# Patient Record
Sex: MALE | Race: BLACK OR AFRICAN AMERICAN | Employment: FULL TIME | URBAN - METROPOLITAN AREA
[De-identification: names, ages, dates, MRNs, and addresses within clinical notes are randomized per-mention and may not be internally consistent; named-entity substitution may affect disease eponyms.]

---

## 2018-07-22 ENCOUNTER — APPOINTMENT (OUTPATIENT)
Dept: CT IMAGING | Age: 38
End: 2018-07-22
Attending: EMERGENCY MEDICINE
Payer: COMMERCIAL

## 2018-07-22 ENCOUNTER — HOSPITAL ENCOUNTER (INPATIENT)
Age: 38
LOS: 3 days | Discharge: HOME | DRG: 064 | End: 2018-07-25
Attending: INTERNAL MEDICINE | Admitting: INTERNAL MEDICINE
Payer: COMMERCIAL

## 2018-07-22 ENCOUNTER — HOSPITAL ENCOUNTER (EMERGENCY)
Age: 38
Discharge: ANOTHER HOSPITAL | End: 2018-07-22
Attending: EMERGENCY MEDICINE
Payer: COMMERCIAL

## 2018-07-22 VITALS
HEIGHT: 69 IN | HEART RATE: 85 BPM | OXYGEN SATURATION: 95 % | RESPIRATION RATE: 33 BRPM | TEMPERATURE: 97.6 F | SYSTOLIC BLOOD PRESSURE: 159 MMHG | DIASTOLIC BLOOD PRESSURE: 98 MMHG | BODY MASS INDEX: 40.03 KG/M2 | WEIGHT: 270.28 LBS

## 2018-07-22 DIAGNOSIS — I62.9 INTRACRANIAL BLEED (HCC): Primary | ICD-10-CM

## 2018-07-22 DIAGNOSIS — R03.0 ELEVATED BLOOD PRESSURE READING: ICD-10-CM

## 2018-07-22 DIAGNOSIS — R07.89 ATYPICAL CHEST PAIN: Primary | ICD-10-CM

## 2018-07-22 DIAGNOSIS — I61.1 NONTRAUMATIC CORTICAL HEMORRHAGE OF LEFT CEREBRAL HEMISPHERE (HCC): ICD-10-CM

## 2018-07-22 LAB
ALBUMIN SERPL-MCNC: 3.7 G/DL (ref 3.5–5)
ALBUMIN/GLOB SERPL: 0.9 {RATIO} (ref 1.1–2.2)
ALP SERPL-CCNC: 111 U/L (ref 45–117)
ALT SERPL-CCNC: 18 U/L (ref 12–78)
ANION GAP SERPL CALC-SCNC: 7 MMOL/L (ref 5–15)
APPEARANCE UR: CLEAR
AST SERPL-CCNC: 14 U/L (ref 15–37)
BACTERIA URNS QL MICRO: NEGATIVE /HPF
BASOPHILS # BLD: 0 K/UL (ref 0–0.1)
BASOPHILS NFR BLD: 1 % (ref 0–1)
BILIRUB SERPL-MCNC: 0.5 MG/DL (ref 0.2–1)
BILIRUB UR QL: NEGATIVE
BUN SERPL-MCNC: 13 MG/DL (ref 6–20)
BUN/CREAT SERPL: 11 (ref 12–20)
CALCIUM SERPL-MCNC: 8.4 MG/DL (ref 8.5–10.1)
CHLORIDE SERPL-SCNC: 106 MMOL/L (ref 97–108)
CO2 SERPL-SCNC: 26 MMOL/L (ref 21–32)
COLOR UR: NORMAL
CREAT SERPL-MCNC: 1.22 MG/DL (ref 0.7–1.3)
DIFFERENTIAL METHOD BLD: NORMAL
EOSINOPHIL # BLD: 0.1 K/UL (ref 0–0.4)
EOSINOPHIL NFR BLD: 1 % (ref 0–7)
EPITH CASTS URNS QL MICRO: NORMAL /LPF
ERYTHROCYTE [DISTWIDTH] IN BLOOD BY AUTOMATED COUNT: 13.5 % (ref 11.5–14.5)
GLOBULIN SER CALC-MCNC: 4 G/DL (ref 2–4)
GLUCOSE SERPL-MCNC: 92 MG/DL (ref 65–100)
GLUCOSE UR STRIP.AUTO-MCNC: NEGATIVE MG/DL
HCT VFR BLD AUTO: 41.8 % (ref 36.6–50.3)
HGB BLD-MCNC: 13.7 G/DL (ref 12.1–17)
HGB UR QL STRIP: NEGATIVE
HYALINE CASTS URNS QL MICRO: NORMAL /LPF (ref 0–5)
IMM GRANULOCYTES # BLD: 0 K/UL (ref 0–0.04)
IMM GRANULOCYTES NFR BLD AUTO: 0 % (ref 0–0.5)
KETONES UR QL STRIP.AUTO: NEGATIVE MG/DL
LEUKOCYTE ESTERASE UR QL STRIP.AUTO: NEGATIVE
LYMPHOCYTES # BLD: 2.5 K/UL (ref 0.8–3.5)
LYMPHOCYTES NFR BLD: 34 % (ref 12–49)
MCH RBC QN AUTO: 29.8 PG (ref 26–34)
MCHC RBC AUTO-ENTMCNC: 32.8 G/DL (ref 30–36.5)
MCV RBC AUTO: 90.9 FL (ref 80–99)
MONOCYTES # BLD: 0.5 K/UL (ref 0–1)
MONOCYTES NFR BLD: 6 % (ref 5–13)
NEUTS SEG # BLD: 4.2 K/UL (ref 1.8–8)
NEUTS SEG NFR BLD: 58 % (ref 32–75)
NITRITE UR QL STRIP.AUTO: NEGATIVE
NRBC # BLD: 0 K/UL (ref 0–0.01)
NRBC BLD-RTO: 0 PER 100 WBC
PH UR STRIP: 7.5 [PH] (ref 5–8)
PLATELET # BLD AUTO: 246 K/UL (ref 150–400)
PMV BLD AUTO: 9.8 FL (ref 8.9–12.9)
POTASSIUM SERPL-SCNC: 3.8 MMOL/L (ref 3.5–5.1)
PROT SERPL-MCNC: 7.7 G/DL (ref 6.4–8.2)
PROT UR STRIP-MCNC: NEGATIVE MG/DL
RBC # BLD AUTO: 4.6 M/UL (ref 4.1–5.7)
RBC #/AREA URNS HPF: NORMAL /HPF (ref 0–5)
SODIUM SERPL-SCNC: 139 MMOL/L (ref 136–145)
SP GR UR REFRACTOMETRY: 1.02 (ref 1–1.03)
TROPONIN I SERPL-MCNC: <0.05 NG/ML
UA: UC IF INDICATED,UAUC: NORMAL
UROBILINOGEN UR QL STRIP.AUTO: 1 EU/DL (ref 0.2–1)
WBC # BLD AUTO: 7.2 K/UL (ref 4.1–11.1)
WBC URNS QL MICRO: NORMAL /HPF (ref 0–4)

## 2018-07-22 PROCEDURE — 70450 CT HEAD/BRAIN W/O DYE: CPT

## 2018-07-22 PROCEDURE — 65610000006 HC RM INTENSIVE CARE

## 2018-07-22 PROCEDURE — 36415 COLL VENOUS BLD VENIPUNCTURE: CPT | Performed by: EMERGENCY MEDICINE

## 2018-07-22 PROCEDURE — 85025 COMPLETE CBC W/AUTO DIFF WBC: CPT | Performed by: EMERGENCY MEDICINE

## 2018-07-22 PROCEDURE — 74011250636 HC RX REV CODE- 250/636: Performed by: NEUROLOGICAL SURGERY

## 2018-07-22 PROCEDURE — 74011000258 HC RX REV CODE- 258: Performed by: NEUROLOGICAL SURGERY

## 2018-07-22 PROCEDURE — 99285 EMERGENCY DEPT VISIT HI MDM: CPT

## 2018-07-22 PROCEDURE — 81001 URINALYSIS AUTO W/SCOPE: CPT | Performed by: EMERGENCY MEDICINE

## 2018-07-22 PROCEDURE — 74011250637 HC RX REV CODE- 250/637: Performed by: NEUROLOGICAL SURGERY

## 2018-07-22 PROCEDURE — 74011250636 HC RX REV CODE- 250/636: Performed by: EMERGENCY MEDICINE

## 2018-07-22 PROCEDURE — 93005 ELECTROCARDIOGRAM TRACING: CPT

## 2018-07-22 PROCEDURE — 84484 ASSAY OF TROPONIN QUANT: CPT | Performed by: EMERGENCY MEDICINE

## 2018-07-22 PROCEDURE — 80053 COMPREHEN METABOLIC PANEL: CPT | Performed by: EMERGENCY MEDICINE

## 2018-07-22 PROCEDURE — 71275 CT ANGIOGRAPHY CHEST: CPT

## 2018-07-22 PROCEDURE — 74011636320 HC RX REV CODE- 636/320: Performed by: EMERGENCY MEDICINE

## 2018-07-22 PROCEDURE — 74011250637 HC RX REV CODE- 250/637: Performed by: EMERGENCY MEDICINE

## 2018-07-22 RX ORDER — MORPHINE SULFATE 1 MG/ML
2 INJECTION, SOLUTION EPIDURAL; INTRATHECAL; INTRAVENOUS
Status: DISCONTINUED | OUTPATIENT
Start: 2018-07-22 | End: 2018-07-25 | Stop reason: HOSPADM

## 2018-07-22 RX ORDER — OXYCODONE AND ACETAMINOPHEN 5; 325 MG/1; MG/1
1 TABLET ORAL
Status: DISCONTINUED | OUTPATIENT
Start: 2018-07-22 | End: 2018-07-24

## 2018-07-22 RX ORDER — LABETALOL HYDROCHLORIDE 5 MG/ML
10 INJECTION, SOLUTION INTRAVENOUS
Status: DISCONTINUED | OUTPATIENT
Start: 2018-07-22 | End: 2018-07-23

## 2018-07-22 RX ORDER — SODIUM CHLORIDE AND POTASSIUM CHLORIDE .9; .15 G/100ML; G/100ML
SOLUTION INTRAVENOUS CONTINUOUS
Status: DISCONTINUED | OUTPATIENT
Start: 2018-07-22 | End: 2018-07-23

## 2018-07-22 RX ORDER — ACETAMINOPHEN 325 MG/1
650 TABLET ORAL
Status: DISCONTINUED | OUTPATIENT
Start: 2018-07-22 | End: 2018-07-25 | Stop reason: HOSPADM

## 2018-07-22 RX ORDER — SODIUM CHLORIDE 9 MG/ML
50 INJECTION, SOLUTION INTRAVENOUS
Status: COMPLETED | OUTPATIENT
Start: 2018-07-22 | End: 2018-07-22

## 2018-07-22 RX ORDER — HYDRALAZINE HYDROCHLORIDE 20 MG/ML
10 INJECTION INTRAMUSCULAR; INTRAVENOUS
Status: DISCONTINUED | OUTPATIENT
Start: 2018-07-22 | End: 2018-07-23

## 2018-07-22 RX ORDER — SODIUM CHLORIDE 0.9 % (FLUSH) 0.9 %
10 SYRINGE (ML) INJECTION
Status: COMPLETED | OUTPATIENT
Start: 2018-07-22 | End: 2018-07-22

## 2018-07-22 RX ORDER — ACETAMINOPHEN 500 MG
1000 TABLET ORAL ONCE
Status: COMPLETED | OUTPATIENT
Start: 2018-07-22 | End: 2018-07-22

## 2018-07-22 RX ADMIN — POTASSIUM CHLORIDE AND SODIUM CHLORIDE: 900; 150 INJECTION, SOLUTION INTRAVENOUS at 19:57

## 2018-07-22 RX ADMIN — SODIUM CHLORIDE 500 MG: 900 INJECTION, SOLUTION INTRAVENOUS at 19:59

## 2018-07-22 RX ADMIN — OXYCODONE HYDROCHLORIDE AND ACETAMINOPHEN 1 TABLET: 5; 325 TABLET ORAL at 20:02

## 2018-07-22 RX ADMIN — HYDRALAZINE HYDROCHLORIDE 10 MG: 20 INJECTION INTRAMUSCULAR; INTRAVENOUS at 20:00

## 2018-07-22 RX ADMIN — IOPAMIDOL 100 ML: 755 INJECTION, SOLUTION INTRAVENOUS at 15:49

## 2018-07-22 RX ADMIN — ACETAMINOPHEN 1000 MG: 500 TABLET ORAL at 17:28

## 2018-07-22 RX ADMIN — Medication 2 MG: at 20:39

## 2018-07-22 RX ADMIN — SODIUM CHLORIDE 50 ML/HR: 900 INJECTION, SOLUTION INTRAVENOUS at 15:49

## 2018-07-22 RX ADMIN — Medication 10 ML: at 15:49

## 2018-07-22 NOTE — CONSULTS
Pt seen and examined full consult dictated. Left temporal-parietal hemorrhage. H/a since Friday. Differential is subacute htn hem, tumor, avm.   Will get mri/mra in am.  Had dye for cta chest this evening

## 2018-07-22 NOTE — ED PROVIDER NOTES
EMERGENCY DEPARTMENT HISTORY AND PHYSICAL EXAM 
 
 
Date: 7/22/2018 Patient Name: Henry Mireles History of Presenting Illness Chief Complaint Patient presents with  
 Headache Ambulatory into the ED with c/o Lt sided headache, Lt arm pain and CP x 7/20  Arm Pain  Chest Pain History Provided By: Patient HPI: Henry Mireles, 45 y.o. male with PMHx significant for arthritis, presents himself w/ family to the ED with cc of constant, aching 7/10 frontal HA since Friday (7/20/18). Pt reports associated intermittent, throbbing right sided CP and SOB since onset of HA. He notes that his CP usually last for about 2 hrs before it is resolved. He is right hand dominant. Pt  denies any modifying factors. Pt denies any recent falls or trauma. Pt denies being on regularly prescribed medications. Pt denies any hx of cardiac issues and migraines. Pt specifically denies cough, rhinorrhea, congestion, weakness, left swelling, nausea, and vomiting. Chief Complaint: HA 
Duration: 3 Days Timing:  Constant Location: frontal head Quality: Aching Severity: 7 out of 10 Modifying Factors: denies any Associated Symptoms: intermittent right sided CP Note: Pt notes he is currently not in any pain at this time except for his HA. There are no other complaints, changes, or physical findings at this time. PCP: Javi Meza MD 
 
Past History Past Medical History: 
Past Medical History:  
Diagnosis Date  Arthritis Past Surgical History: 
History reviewed. No pertinent surgical history. Family History: 
Family History Problem Relation Age of Onset  Hypertension Mother  Heart Disease Father Social History: 
Social History Substance Use Topics  Smoking status: Never Smoker  Smokeless tobacco: Never Used  Alcohol use No  
 
 
Allergies: 
No Known Allergies Review of Systems Review of Systems Constitutional: Negative for activity change, chills and fever. HENT: Negative for congestion, rhinorrhea and sore throat. Eyes: Negative for pain and redness. Respiratory: Positive for shortness of breath. Negative for cough and chest tightness. Cardiovascular: Positive for chest pain (right sided). Negative for palpitations and leg swelling. Gastrointestinal: Negative for abdominal pain, diarrhea, nausea and vomiting. Genitourinary: Negative for dysuria, frequency and urgency. Musculoskeletal: Negative for back pain and neck pain. Skin: Negative for rash. Neurological: Positive for headaches (frontal). Negative for syncope and light-headedness. Psychiatric/Behavioral: Negative for confusion. All other systems reviewed and are negative. Physical Exam  
Physical Exam  
Constitutional: He is oriented to person, place, and time. He appears well-developed and well-nourished. No distress. HENT:  
Head: Normocephalic and atraumatic. Nose: Nose normal.  
Mouth/Throat: Oropharynx is clear and moist.  
Eyes: Conjunctivae and EOM are normal. Pupils are equal, round, and reactive to light. No scleral icterus. Conjunctiva clear bilaterally; Neck: Normal range of motion. Neck supple. No JVD present. No tracheal deviation present. No thyromegaly present. Cardiovascular: Normal rate, regular rhythm and intact distal pulses. Exam reveals no gallop and no friction rub. No murmur heard. Pulmonary/Chest: Effort normal and breath sounds normal. No stridor. No respiratory distress. He has no wheezes. He has no rales. He exhibits no tenderness. Abdominal: Soft. Bowel sounds are normal. He exhibits no distension. There is no tenderness. There is no rebound and no guarding. Musculoskeletal: Normal range of motion. He exhibits no edema or tenderness. Neurological: He is alert and oriented to person, place, and time. He displays normal reflexes. No cranial nerve deficit. He exhibits normal muscle tone.  Coordination normal.  
5/5 strength throughout; no past pointing; good heel to shin; negative romberg; holds both arms extended in front of them for 10 seconds without difficulty or drift; lifts legs off of bed without difficulty; no pronator drift; good equal  strength bilaterally; motor and sensory grossly intact; no facial asymmetry;   
Skin: Skin is warm and dry. No rash noted. He is not diaphoretic. No erythema. Psychiatric: He has a normal mood and affect. His behavior is normal.  
Nursing note and vitals reviewed. Diagnostic Study Results Labs - No results found for this or any previous visit (from the past 12 hour(s)). Radiologic Studies -  
CT Results  (Last 48 hours) 07/25/18 0851  CTA HEAD Final result Impression:  IMPRESSION: Left posterior temporal hematoma is unchanged from prior  
examination. There is no evidence for intracranial aneurysm or vascular  
malformation. Narrative: Indication: Evaluate intracranial hemorrhage. Contrast-enhanced CT angiogram of the head performed using 100 cc Isovue 300. Three-dimensional postprocessing performed. CT dose reduction was achieved through use of a standardized protocol tailored  
for this examination and are automatic exposure control for dose modulation dose  
reduction. FINDINGS:  
   
Precontrast images redemonstrate the left posterior temporal hematoma which is  
unchanged from July 22. There is mild edema adjacent to the hematoma but no  
significant mass effect. CT angiogram does not demonstrate abnormal vessels in the region of the  
hematoma. The major intracranial vessels are patent. There is no evidence for  
intracranial aneurysm or vascular malformation. Medical Decision Making I am the first provider for this patient. I reviewed the vital signs, available nursing notes, past medical history, past surgical history, family history and social history.  
 
Vital Signs-Reviewed the patient's vital signs. No data found. ED EKG interpretation: 13:55 Rhythm: normal sinus rhythm; and regular . Rate (approx.): 81; Axis: normal; IN Interval: normal; QRS interval: incomplete RBBB; ST/T wave: non-specific changes; No old ekg in ehr for comparison; This EKG was interpreted by Cuong Boyd MD,ED Provider. Records Reviewed: Nursing Notes and Old Medical Records Provider Notes (Medical Decision Making): DDx: ACS, viral syndrome, aortic dissection, PE 
 
ED Course:  
Initial assessment performed. The patients presenting problems have been discussed, and they are in agreement with the care plan formulated and outlined with them. I have encouraged them to ask questions as they arise throughout their visit. Consult Note: 
4:41 PM 
Cuong Boyd MD spoke with Dr.Matthew Maximiliano Matthews, Specialty: Neurosurgery Discussed pt's hx, disposition, and available diagnostic and imaging results. Reviewed care plans. Consultant agrees with plans as outlined. Recommends transferring pt to [facility].  
 
Consult Note: 
4:54 PM 
Cuong Boyd MD spoke with Dr. David Nunez, Specialty: [facility] 
Discussed pt's hx, disposition, and available diagnostic and imaging results. Reviewed care plans. Consultant agrees with plans as outlined. Will accept pt at [facility]. CT head with acute parenchymal intracerebral hemorrhage; vss. No neuro deficits. D/w neurosurgery. Will transfer to [facility] for further evaluation and treatment. Cuong Boyd MD] Critical Care Time:  
0 min Transfer Note: 
Patient is being transferred to [facility], transfer accepted by Dr. David Nunez. The reasons for patient's transfer have been discussed with the patient and available family. They convey agreement and understanding for the need to be transferred as explained to them by Cuong Boyd MD. 
 
PLAN: 
1. Transfer to [address]  
 
Diagnosis Clinical Impression: 1.  Atypical chest pain   
2. Elevated blood pressure reading 3. Nontraumatic cortical hemorrhage of left cerebral hemisphere ([address] Attestations: This note is prepared by [employer], acting as Scribe for MD Nidhi Bazzi MD: The scribe's documentation has been prepared under my direction and personally reviewed by me in its entirety. I confirm that the note above accurately reflects all work, treatment, procedures, and medical decision making performed by me.

## 2018-07-22 NOTE — ED NOTES
Pt presents to ED with c/o intermittent mid chest pain since Friday. Pt denies shortness of breath. Pt also with c/o R lower back pain x 1 week, pt denies injury. Pt also with c/o sinus pressure to forehead and behind L eye and sore throat since Friday. Pt denies n/v/d and fever. Pt placed on cardiac monitor x3. VSS. Pt in position of comfort with call bell within reach and no signs of acute distress noted.

## 2018-07-22 NOTE — ED NOTES
Critical care transport at bedside to transport pt to [address] [place] [address]. Patient awake and stable upon ED exit.

## 2018-07-22 NOTE — PROGRESS NOTES
TRANSFER - IN REPORT:    Verbal report received from [facility]  being [facility] for routine progression of care      Report consisted of patients Situation, Background, Assessment and   Recommendations(SBAR). Information from the following report(s) SBAR, Procedure Summary, Intake/Output, MAR, Med Rec Status and Cardiac Rhythm NSR was reviewed with the receiving nurse. Opportunity for questions and clarification was provided. Assessment completed upon patients arrival to unit and care assumed. 1900: Patient arrived on unit. A&O X4, unsure of president, VYAS, complains of 5/10 head pain, ectopy noted on monitor, hospitalist paged, paged Dr. Ramon Muhammad. Primary Nurse Madelin Hoskins and Edwardo Darden RN, RN performed a dual skin assessment on this patient No impairment noted  Kojo score is 22    1910: Spoke to Dr. Ramon Muhammad, orders received to maintain SBP <160 and will be in to see patient shortly. 1920: Hospitalist repaged. Dr. Ramon Muhammad at bedside. Orders received for ECG. Bedside and Verbal shift change report given to Kirsty RN by Michael RN & Edwardo Darden RN. Report included the following information SBAR, Kardex, ED Summary, Intake/Output, MAR, Recent Results, Med Rec Status and Cardiac Rhythm NSR, frequent PVCs.

## 2018-07-22 NOTE — PROGRESS NOTES
1930-bedside report received from [email or web address] using sbar format/alarms checked.  Dr. Ramon Muhammad into see pt/orders recieved  1945-Hospitalist paged to notify of pt arrival and need of admit orders  2000-c/o of bi frontal dull H/A, sbp 150-152, and c/o of being hungry-medicated per order w/percocet for pain /hydralazine for bp and jello/water and saltines given  2023-hospitalist re-paged for the 2nd time-mother, sig other and 1year old daughter at bedside/updated and all voiced their understanding and have no questions at this time  2046-no return call from hospitalist/nursing supervisor paged and made aware  2039-c/o of inc H/A  Morphine given per order  2055-hospitalist returned page/pt discussed and he will be up shortly to admit/pt stated his head feels better  2110-hospitalist at bedside (Dr. Ryland Flanagan)  2130-mother and sig other have gone home/phone numbers exchanged and pt access code given to both  2200-asleep unless disturbed  0009-c/o H/A 5/10 across forehead behind both eyes described as constant/dull and aching   Medicated w/morphine per order  0100-asleep      0730-bedside shift report given to RN using sbar format

## 2018-07-22 NOTE — IP AVS SNAPSHOT
[facility] [facility] [address] [place] [address] 
[phone] Patient: Lalitha Chappell MRN: [id number] KIC:7[birth year] About your hospitalization You were admitted on:  July 22, 2018 You last received care in the:  [facility] 6S NEURO-SCI TELE You were discharged on:  July 25, 2018 Why you were hospitalized Your primary diagnosis was: Intracranial Bleed (Hcc) Follow-up Information Follow up With Details Comments Contact Info King Dyana DO Go on 8/7/2018 for hemorrhagic stroke follow-up at 2:00. Arrive at 1:30. Bring photo ID, insurance card, co-pay ($45), list of medications [address] [place] [address] 56 [facility] at [address] 
[phone] Audrey Benavides MD On 7/30/2018 New PCP appointment on Monday July 30 @ 11:00 a.m. [facility] [address] [facility] 57 
[phone] Your Scheduled Appointments Monday July 30, 2018 11:00 AM EDT New Patient with Audrey Benavides MD  
[address] [place] [address] and Primary Care [address] [place] [address]) [address] [address]  
[phone] Tuesday August 07, 2018  2:00 PM EDT New Patient with DO Priscilla Brunoppard [facility] Neurology [facility] at [facility] [address] [place] [address] [address] [place] [address]  
[phone] Discharge Orders None A check juan indicates which time of day the medication should be taken. My Medications START taking these medications Instructions Each Dose to Equal  
 Morning Noon Evening Bedtime  
 amLODIPine 5 mg tablet Commonly known as:  Antonia Colon Start taking on:  7/26/2018 Your last dose was: Your next dose is: Take 1 Tab by mouth daily. 5 mg  
    
   
   
   
  
 butalbital-acetaminophen-caffeine -40 mg per tablet Commonly known as:  Rina Uriostegui Your last dose was: Your next dose is: Take 1 Tab by mouth every four (4) hours as needed for Headache. 1 Tab  
    
   
   
   
  
 hydroCHLOROthiazide 12.5 mg tablet Commonly known as:  HYDRODIURIL Your last dose was: Your next dose is: Take 1 Tab by mouth daily. 12.5 mg  
    
   
   
   
  
 levETIRAcetam 500 mg tablet Commonly known as:  KEPPRA Your last dose was: Your next dose is: Take 1 Tab by mouth two (2) times a day. 500 mg  
    
   
   
   
  
 metoprolol tartrate 50 mg tablet Commonly known as:  LOPRESSOR Your last dose was: Your next dose is: Take 1 Tab by mouth two (2) times a day. 50 mg Where to Get Your Medications Information on where to get these meds will be given to you by the nurse or doctor. ! Ask your nurse or doctor about these medications  
  amLODIPine 5 mg tablet  
 butalbital-acetaminophen-caffeine -40 mg per tablet  
 hydroCHLOROthiazide 12.5 mg tablet  
 levETIRAcetam 500 mg tablet  
 metoprolol tartrate 50 mg tablet Discharge Instructions Discharge Instructions PATIENT ID: Maria Eugenia Ordonez MRN: [id number] YOB: 1980 DATE OF ADMISSION: 7/22/2018  6:59 PM   
DATE OF DISCHARGE: 7/25/2018 PRIMARY CARE PROVIDER: Mis Dunbar MD  
 
ATTENDING PHYSICIAN: Lata Chavez MD 
DISCHARGING PROVIDER: Lata Chavez MD   
To contact this individual call [phone] and ask the  to page. If unavailable ask to be transferred the Adult Hospitalist Department. DISCHARGE DIAGNOSES intracranial bleed. CONSULTATIONS: None PROCEDURES/SURGERIES: * No surgery found * PENDING TEST RESULTS:  
At the time of discharge the following test results are still pending: na 
 
FOLLOW UP APPOINTMENTS:  
Follow-up Information Follow up With Details Comments Contact Info Angeline Kehr, DO Go on 8/7/2018 for hemorrhagic stroke follow-up at 2:00. Arrive at 1:30. Bring photo ID, insurance card, co-pay ($45), list of medications 200 [facility] [address] [place]  [facility] at [place] [address] 
[phone] Raza Alonzo MD   [address] [facility] 74 
[phone] ADDITIONAL CARE RECOMMENDATIONS: na 
 
DIET: Cardiac Diet ACTIVITY: back to work in w weeks. No heavy lifting, no over exertion WOUND CARE: na 
 
EQUIPMENT needed: na 
 
 
  
 SNF/Inpatient Rehab/LTAC  
x Independent/assisted living Hospice Other:  
 
  
 
Signed:  
Brooke Coffey MD 
7/25/2018 11:09 AM 
 
  
  
  
[employer] Announcement We are excited to announce that we are making your provider's discharge notes available to you in [employer]. You will see these notes when they are completed and signed by the physician that discharged you from your recent hospital stay.   If you have any questions or concerns about any information you see in [employer], please call the [employer] Information Department where you were seen or reach out to your Primary Care Provider for more information about your plan of care. Introducing hospitals & HEALTH SERVICES! Aura Araujo introduces [employer] patient portal. Now you can access parts of your medical record, email your doctor's office, and request medication refills online. 1. In your internet browser, go to [email or web address] 2. Click on the First Time User? Click Here link in the Sign In box. You will see the New Member Sign Up page. 3. Enter your [employer] Access Code exactly as it appears below. You will not need to use this code after youve completed the sign-up process. If you do not sign up before the expiration date, you must request a new code. · [employer] Access Code: [email or web address] Expires: 10/20/2018  1:47 PM 
 
4. Enter the last four digits of your Social Security Number (xxxx) and Date of Birth (mm/dd/yyyy) as indicated and click Submit. You will be taken to the next sign-up page. 5. Create a [employer] ID. This will be your [employer] login ID and cannot be changed, so think of one that is secure and easy to remember. 6. Create a [employer] password. You can change your password at any time. 7. Enter your Password Reset Question and Answer. This can be used at a later time if you forget your password. 8. Enter your e-mail address. You will receive e-mail notification when new information is available in [address] 9. Click Sign Up. You can now view and download portions of your medical record. 10. Click the Download Summary menu link to download a portable copy of your medical information. If you have questions, please visit the Frequently Asked Questions section of the [employer] website. Remember, [employer] is NOT to be used for urgent needs. For medical emergencies, dial 911. Now available from your iPhone and Android! Introducing Marty Fermin As a [employer] patient, I wanted to make you aware of our electronic visit tool called Marty Prietotiffanykenny. [employer] 24/7 allows you to connect within minutes with a medical provider 24 hours a day, seven days a week via a mobile device or tablet or logging into a secure website from your computer. You can access Marty Kamarafin from anywhere in the [place]. A virtual visit might be right for you when you have a simple condition and feel like you just dont want to get out of bed, or cant get away from work for an appointment, when your regular [employer] provider is not available (evenings, weekends or holidays), or when youre out of town and need minor care. Electronic visits cost only $49 and if the [employer] 24/7 provider determines a prescription is needed to treat your condition, one can be electronically transmitted to a nearby pharmacy*. Please take a moment to enroll today if you have not already done so. The enrollment process is free and takes just a few minutes. To enroll, please download the [employer] 24/7 tejas to your tablet or phone, or visit [email or web address] to enroll on your computer. And, as an [address] [place] [address] patient with a [employer] account, the results of your visits will be scanned into your electronic medical record and your primary care provider will be able to view the scanned results. We urge you to continue to see your regular [employer] provider for your ongoing medical care. And while your primary care provider may not be the one available when you seek a Marty Prietotiffanyfin virtual visit, the peace of mind you get from getting a real diagnosis real time can be priceless. For more information on Marty Connergege, view our Frequently Asked Questions (FAQs) at [email or web address]. Sincerely, 
 
Joni Subramanian MD 
Chief Medical Officer [facility] *:  certain medications cannot be prescribed via Marty Fermin Providers Seen During Your Hospitalization Provider Specialty Primary office phone Eve Pereira MD Internal Medicine [phone] Kei Mota MD Internal Medicine [phone] Cristóbal Troy MD Internal Medicine [phone] Your Primary Care Physician (PCP) Primary Care Physician Office Phone Office Fax Jerod Johns [phone] You are allergic to the following No active allergies Recent Documentation Height Weight BMI Smoking Status 1.753 m 121 kg 39.39 kg/m2 Never Smoker Emergency Contacts Name Discharge Info Relation Home Work Mobile Cousins,Lilliana DISCHARGE CAREGIVER [3] Parent [1] [phone] Patient Belongings The following personal items are in your possession at time of discharge: 
  Dental Appliances: None  Visual Aid: None      Home Medications: None   Jewelry: None       Other Valuables: Cell Phone  Personal Items Sent to Safe: none Please provide this summary of care documentation to your next provider. Signatures-by signing, you are acknowledging that this After Visit Summary has been reviewed with you and you have received a copy. Patient Signature:  ____________________________________________________________ Date:  ____________________________________________________________  
  
[facility] Provider Signature:  ____________________________________________________________ Date:  ____________________________________________________________

## 2018-07-22 NOTE — IP AVS SNAPSHOT
[address] 
[phone] Patient: Tino Dave MRN: [id number] YOF:[birth year] A check juan indicates which time of day the medication should be taken. My Medications START taking these medications Instructions Each Dose to Equal  
 Morning Noon Evening Bedtime  
 amLODIPine 5 mg tablet Commonly known as:  Abena Alberta Start taking on:  7/26/2018 Your last dose was: Your next dose is: Take 1 Tab by mouth daily. 5 mg  
    
   
   
   
  
 butalbital-acetaminophen-caffeine -40 mg per tablet Commonly known as:  Rahda Bigger Your last dose was: Your next dose is: Take 1 Tab by mouth every four (4) hours as needed for Headache. 1 Tab  
    
   
   
   
  
 levETIRAcetam 500 mg tablet Commonly known as:  KEPPRA Your last dose was: Your next dose is: Take 1 Tab by mouth two (2) times a day. 500 mg  
    
   
   
   
  
 metoprolol tartrate 25 mg tablet Commonly known as:  LOPRESSOR Your last dose was: Your next dose is: Take 2 Tabs by mouth every twelve (12) hours. 50 mg Where to Get Your Medications Information on where to get these meds will be given to you by the nurse or doctor. ! Ask your nurse or doctor about these medications  
  amLODIPine 5 mg tablet  
 butalbital-acetaminophen-caffeine -40 mg per tablet  
 levETIRAcetam 500 mg tablet  
 metoprolol tartrate 25 mg tablet

## 2018-07-23 ENCOUNTER — APPOINTMENT (OUTPATIENT)
Dept: MRI IMAGING | Age: 38
DRG: 064 | End: 2018-07-23
Attending: NEUROLOGICAL SURGERY
Payer: COMMERCIAL

## 2018-07-23 LAB
ALBUMIN SERPL-MCNC: 3 G/DL (ref 3.5–5)
ALBUMIN/GLOB SERPL: 0.8 {RATIO} (ref 1.1–2.2)
ALP SERPL-CCNC: 106 U/L (ref 45–117)
ALT SERPL-CCNC: 16 U/L (ref 12–78)
ANION GAP SERPL CALC-SCNC: 8 MMOL/L (ref 5–15)
AST SERPL-CCNC: 15 U/L (ref 15–37)
ATRIAL RATE: 67 BPM
ATRIAL RATE: 81 BPM
BILIRUB SERPL-MCNC: 0.7 MG/DL (ref 0.2–1)
BUN SERPL-MCNC: 12 MG/DL (ref 6–20)
BUN/CREAT SERPL: 10 (ref 12–20)
CALCIUM SERPL-MCNC: 8.1 MG/DL (ref 8.5–10.1)
CALCULATED P AXIS, ECG09: 64 DEGREES
CALCULATED R AXIS, ECG10: 173 DEGREES
CALCULATED R AXIS, ECG10: 58 DEGREES
CALCULATED T AXIS, ECG11: 153 DEGREES
CALCULATED T AXIS, ECG11: 52 DEGREES
CHLORIDE SERPL-SCNC: 106 MMOL/L (ref 97–108)
CO2 SERPL-SCNC: 25 MMOL/L (ref 21–32)
CREAT SERPL-MCNC: 1.2 MG/DL (ref 0.7–1.3)
DIAGNOSIS, 93000: NORMAL
DIAGNOSIS, 93000: NORMAL
GLOBULIN SER CALC-MCNC: 3.8 G/DL (ref 2–4)
GLUCOSE SERPL-MCNC: 95 MG/DL (ref 65–100)
P-R INTERVAL, ECG05: 144 MS
P-R INTERVAL, ECG05: 152 MS
POTASSIUM SERPL-SCNC: 4.3 MMOL/L (ref 3.5–5.1)
PROT SERPL-MCNC: 6.8 G/DL (ref 6.4–8.2)
Q-T INTERVAL, ECG07: 370 MS
Q-T INTERVAL, ECG07: 404 MS
QRS DURATION, ECG06: 102 MS
QRS DURATION, ECG06: 116 MS
QTC CALCULATION (BEZET), ECG08: 426 MS
QTC CALCULATION (BEZET), ECG08: 429 MS
SODIUM SERPL-SCNC: 139 MMOL/L (ref 136–145)
VENTRICULAR RATE, ECG03: 67 BPM
VENTRICULAR RATE, ECG03: 81 BPM

## 2018-07-23 PROCEDURE — 65660000000 HC RM CCU STEPDOWN

## 2018-07-23 PROCEDURE — 70544 MR ANGIOGRAPHY HEAD W/O DYE: CPT

## 2018-07-23 PROCEDURE — G8988 SELF CARE GOAL STATUS: HCPCS

## 2018-07-23 PROCEDURE — 80053 COMPREHEN METABOLIC PANEL: CPT | Performed by: INTERNAL MEDICINE

## 2018-07-23 PROCEDURE — 74011000258 HC RX REV CODE- 258: Performed by: NEUROLOGICAL SURGERY

## 2018-07-23 PROCEDURE — 74011250637 HC RX REV CODE- 250/637: Performed by: NEUROLOGICAL SURGERY

## 2018-07-23 PROCEDURE — 93306 TTE W/DOPPLER COMPLETE: CPT

## 2018-07-23 PROCEDURE — 97161 PT EVAL LOW COMPLEX 20 MIN: CPT

## 2018-07-23 PROCEDURE — A9575 INJ GADOTERATE MEGLUMI 0.1ML: HCPCS | Performed by: INTERNAL MEDICINE

## 2018-07-23 PROCEDURE — 97535 SELF CARE MNGMENT TRAINING: CPT

## 2018-07-23 PROCEDURE — 70553 MRI BRAIN STEM W/O & W/DYE: CPT

## 2018-07-23 PROCEDURE — 97165 OT EVAL LOW COMPLEX 30 MIN: CPT

## 2018-07-23 PROCEDURE — 36415 COLL VENOUS BLD VENIPUNCTURE: CPT | Performed by: INTERNAL MEDICINE

## 2018-07-23 PROCEDURE — 74011250637 HC RX REV CODE- 250/637: Performed by: NURSE PRACTITIONER

## 2018-07-23 PROCEDURE — 74011250636 HC RX REV CODE- 250/636: Performed by: INTERNAL MEDICINE

## 2018-07-23 PROCEDURE — 74011250636 HC RX REV CODE- 250/636: Performed by: NEUROLOGICAL SURGERY

## 2018-07-23 PROCEDURE — 97116 GAIT TRAINING THERAPY: CPT

## 2018-07-23 PROCEDURE — 74011000250 HC RX REV CODE- 250: Performed by: NURSE PRACTITIONER

## 2018-07-23 PROCEDURE — G8987 SELF CARE CURRENT STATUS: HCPCS

## 2018-07-23 PROCEDURE — G8989 SELF CARE D/C STATUS: HCPCS

## 2018-07-23 PROCEDURE — 74011250637 HC RX REV CODE- 250/637: Performed by: INTERNAL MEDICINE

## 2018-07-23 RX ORDER — GADOTERATE MEGLUMINE 376.9 MG/ML
20 INJECTION INTRAVENOUS
Status: COMPLETED | OUTPATIENT
Start: 2018-07-23 | End: 2018-07-23

## 2018-07-23 RX ORDER — LEVETIRACETAM 500 MG/1
500 TABLET ORAL 2 TIMES DAILY
Status: DISCONTINUED | OUTPATIENT
Start: 2018-07-23 | End: 2018-07-25 | Stop reason: HOSPADM

## 2018-07-23 RX ORDER — HYDRALAZINE HYDROCHLORIDE 20 MG/ML
10 INJECTION INTRAMUSCULAR; INTRAVENOUS
Status: DISCONTINUED | OUTPATIENT
Start: 2018-07-23 | End: 2018-07-25 | Stop reason: HOSPADM

## 2018-07-23 RX ORDER — METOPROLOL TARTRATE 25 MG/1
25 TABLET, FILM COATED ORAL EVERY 12 HOURS
Status: DISCONTINUED | OUTPATIENT
Start: 2018-07-23 | End: 2018-07-25 | Stop reason: HOSPADM

## 2018-07-23 RX ORDER — LABETALOL HYDROCHLORIDE 5 MG/ML
10 INJECTION, SOLUTION INTRAVENOUS
Status: DISCONTINUED | OUTPATIENT
Start: 2018-07-23 | End: 2018-07-25 | Stop reason: HOSPADM

## 2018-07-23 RX ADMIN — ACETAMINOPHEN 650 MG: 325 TABLET ORAL at 08:34

## 2018-07-23 RX ADMIN — LEVETIRACETAM 500 MG: 500 TABLET ORAL at 20:33

## 2018-07-23 RX ADMIN — GADOTERATE MEGLUMINE 20 ML: 376.9 INJECTION INTRAVENOUS at 12:06

## 2018-07-23 RX ADMIN — METOPROLOL TARTRATE 25 MG: 25 TABLET ORAL at 20:33

## 2018-07-23 RX ADMIN — ACETAMINOPHEN 650 MG: 325 TABLET ORAL at 16:28

## 2018-07-23 RX ADMIN — POTASSIUM CHLORIDE AND SODIUM CHLORIDE: 900; 150 INJECTION, SOLUTION INTRAVENOUS at 04:49

## 2018-07-23 RX ADMIN — LABETALOL HYDROCHLORIDE 10 MG: 5 INJECTION, SOLUTION INTRAVENOUS at 13:30

## 2018-07-23 RX ADMIN — SODIUM CHLORIDE 500 MG: 900 INJECTION, SOLUTION INTRAVENOUS at 08:29

## 2018-07-23 RX ADMIN — Medication 2 MG: at 00:09

## 2018-07-23 NOTE — CDMP QUERY
Can you please clarify if the pt is or was  being monitored/treated for:      =>Hypertensive crisis (POA) as evidenced by pt who presents with BP up to 118, with severe headache, found to have ICH, now requiring ICU level care, IV Hypertensive meds, close neuro checks, (MRI negative for tumor)      =>Hypertensive emergency  =>Other Explanation of clinical findings  =>Clinically Undetermined (no explanation for clinical findings)     The medical record reflects the following clinical findings, treatment, and risk factors:     Risk Factors:   BMI is @  40.7,   Clinical Indicators:Severe Headache for last 3 days. SBP up to 118 documented. Treatment: ound to have [address], now requiring ICU level care, IV Hypertensive meds, close neuro checks, (MRI negative for tumor)       Please clarify and document your clinical opinion in the progress notes and discharge summary including the definitive and/or presumptive diagnosis, (suspected or probable), related to the above clinical findings. Please include clinical findings supporting your diagnosis.      REFERENCE:  -    Hypertensive Crisis: BP elevates rapidly and severely enough to potentially cause organ damage (e.g. severe HA, SOB, nosebleed, severe anxiety, nausea/vomiting, etc.)    Hypertensive Emergency: SBP >180 or DBP > 120 with associated organ dysfunction (e.g. CVA, LOC, memory loss, MI, NIURKA, aortic dissection, angina, pulmonary edema, encephalopathy, retinopathy, HELLP, etc.)    Thank you,  Diana BARBERN, [facility], [address] [facility]  [phone]

## 2018-07-23 NOTE — CDMP QUERY
Patient is noted to have a BMI of 40.1       . Please clarify if this patient is:     =>Morbidly obese (BMI ³ 40)    =>Obese (BMI 30 - 39.9)  =>Overweight (BMI 25 - 29.9)  =>Other explanation of clinical findings  =>Clinically Undetermined (no explanation for clinical findings)    Presentation: 275lbs and 5'9\"  = BMI 40.7    REFERENCE:  The [address] [place] [address] has issued a statement indicating that, \"Individuals who are overweight, obese, or morbidly obese are at an increased risk for certain medical conditions when compared to persons of normal weight. Therefore, these conditions are always clinically significant and reportable when documented by the provider. Please clarify and document your clinical opinion in the progress notes and discharge summary, including the definitive and or presumptive diagnosis, (suspected or probable), related to the above clinical findings. Please include clinical findings supporting your diagnosis.      Thank you,  Moreno Ramos, SUNILN, RN, [address]  [phone]

## 2018-07-23 NOTE — CONSULTS
[address]    Kristen Dinero  MR#: [id number]  : 1980  ACCOUNT #: [de-identified]   DATE OF SERVICE: 2018    CONSULTING DOCTOR:  David Nunez MD, hospitalist, [facility]. REASON FOR CONSULTATION:  Left parietal temporal hematoma. HISTORY OF PRESENT ILLNESS:  The patient is a 69-year-old male with a history of arthritis and possibly undiagnosed hypertension. He presented to the emergency room at [facility] today with a history of a headache for 2 days that came on somewhat acutely on Friday. He has had some throbbing of his head. He has also had some chest pain and shortness of breath. He does not have any history of cardiac issues or migraines or regular headaches. He does have a PCP. He is not treated for hypertension. He says he does not go to the doctor as much as he should. He was found to be hypertensive in the emergency room at [facility].  CT of the head shows an approximately 2 cm left parietal temporal hematoma with some surrounding vasogenic edema. This could either be a subacute hemorrhagic infarct versus a hemorrhagic tumor versus an AVM. There is no evidence of subarachnoid hemorrhage, no significant mass effect, no intraventricular hemorrhage or hydrocephalus. PAST MEDICAL HISTORY:  Arthritis. PAST SURGICAL HISTORY:  None known. MEDICATIONS:  On admission, he denies any medications. ALLERGIES:  NO KNOWN DRUG ALLERGIES. SOCIAL HISTORY:  He lives in [facility]. He works. Does not drink or smoke. REVIEW OF SYSTEMS:  As above. PHYSICAL EXAMINATION:  GENERAL:  He is a healthy male in no apparent distress. NEUROLOGIC:  Awake, alert and oriented x3. He has a significant stutter that he said he has had since child. He is moving all extremities well. No focal motor or sensory deficits. No extinction. IMAGING:  As above.     IMPRESSION:  The patient has a spontaneous left temporoparietal hemorrhage with a little bit of surrounding edema. He is young to have a hemorrhagic stroke. Certainly, we need to proceed with an MRI to rule out an underlying neoplastic or vascular lesion. He has recently had a CT angiogram of his chest at [facility].  Therefore, I would not repeat a CTA tonight in order to save the dye. He seemed stable. This may just be the consequences of subacute hemorrhage causing the edema. Will follow him closely in the ICU with blood pressure control, start him on Keppra for antiepileptic, and get an MRI tomorrow. MD Chip Farris /   D: 07/22/2018 19:40     T: 07/23/2018 00:28  JOB #: [id number]  CC: Kaela Gan MD  CC: Esa Pruitt MD

## 2018-07-23 NOTE — PROGRESS NOTES
0800 Bedside and Verbal shift change report given to Hima Justin RN and Rajani Guidry RN (oncoming nurse) by Bong Montelongo RN (offgoing nurse). Report included the following information SBAR, Kardex, ED Summary, Procedure Summary, Intake/Output, MAR, Recent Results and Cardiac Rhythm NSR with occasional PVC.     0800 Assumed care, neuro assessment complete, stable. VS stable on RA.     0830 Called MRI. Aware of pt. Plans for MRI at 200.     0900 Marian Kohli NP, Neurosurgery at bedside to assess pt. Okay for Q2 hour neuro checks. May travel to MRI without RN. Pt to remain in ICU pending MRI results. Okay for regular diet. 0930 Dr. Weston Florez at pt bedside, assessing pt. No new orders. [address] Transport arranged. 1105 Off floor with transport to MRI. 1230 Pt back from MRI. Stable neuro assessment on arrival to floor. 1330 SBP in 160s. PRN labatolol administered per [facility].    1503 MD notified of trigeminal rhythm with activity, PRN BP medication administration and SBP running 130s-140s. Orders received for scheduled PO metoprolol and ECHO. 1528 ECHO tech at bedside. 1600 Pt ambulating in hallway with PT, tolerated well.     1700 TRANSFER - OUT REPORT:    Verbal report given to ZIA Weinstein(name) on Landon Phan  being transferred to NSTU(unit) for routine progression of care       Report consisted of patients Situation, Background, Assessment and   Recommendations(SBAR). Information from the following report(s) SBAR, Kardex, ED Summary, Procedure Summary, Intake/Output, MAR, Recent Results and Cardiac Rhythm NSR with intermittent trigeminy was reviewed with the receiving nurse.     Lines:   Peripheral IV 07/22/18 Right Antecubital (Active)   Site Assessment Clean, dry, & intact 7/23/2018  4:00 PM   Phlebitis Assessment 0 7/23/2018  4:00 PM   Infiltration Assessment 0 7/23/2018  4:00 PM   Dressing Status Clean, dry, & intact 7/23/2018  4:00 PM   Dressing Type Tape;Transparent 7/23/2018  4:00 PM   Hub Color/Line Status Pink;Capped 7/23/2018  4:00 PM   Action Taken Open ports on tubing capped 7/23/2018  4:00 PM   Alcohol Cap Used Yes 7/23/2018  4:00 PM        Opportunity for questions and clarification was provided.       Patient transported with:   Registered Nurse

## 2018-07-23 NOTE — PROGRESS NOTES
[facility]    Chart reviewed  46 y/o with apparent Left temporal-parietal hemorrhage.   PMH = prob undx HTN  Stable overnight  Some ha  Discussed on multi-D rounds    D/w NSGY  Awaiting MRI/MRA this am.  If stable will be transferred out ICU        Available if needed for any ICU issues  David

## 2018-07-23 NOTE — H&P
[address]  HISTORY AND PHYSICAL      Desire ANDERSON  MR#: [id number]  : 1980  ACCOUNT #: [de-identified]   ADMIT DATE: 2018    CHIEF COMPLAINT:  Left-sided headaches for the last 3 days. HISTORY OF PRESENT ILLNESS:  The patient is a 66-year-old male with no significant past medical history who presents to the [facility] with a 3-day history of constant headache which was about 9/10. The patient had CT scans done which showed a left parietal hematoma. The patient was transferred over to [facility] for further evaluation. The patient was evaluated by a neurosurgeon. Plan is to repeat an MRI scan in the morning. The patient has been receiving Keppra for seizure control, and also the blood pressure is being monitored closely. The patient at this time feels fine. The headache has much improved. Also, the patient, in the [facility], had complained of some nonspecific chest pains for which a CT scan of the chest was done, which was negative. The patient at this time denies have any weakness or numbness or tingling of the upper and lower extremities. Did not complain of any slurring of the speech, visual disturbance or diplopia. PAST MEDICAL HISTORY:  Essentially unremarkable. ALLERGIES:  NONE. MEDICATIONS:  None. FAMILY HISTORY:  Mother has hypertension. SOCIAL HISTORY:  Nonsmoker, no alcohol use. REVIEW OF SYSTEMS:  CONSTITUTIONAL REVIEW OF SYSTEMS:  Negative fevers, negative chills. Negative night sweats. EYES:  Negative visual problem, negative diplopia. HEAD, EARS, NOSE, THROAT:  Negative sore throat. Negative earaches. Negative postnasal drip. RESPIRATORY:  Negative cough, negative wheezing. CARDIOVASCULAR:  Positive chest pain completely resolved now. Negative dyspnea on exertion. GASTROINTESTINAL:  Negative abdominal pain. Negative vomiting. Negative diarrhea.   MUSCULOSKELETAL: Review of systems was essentially unremarkable. CENTRAL NERVOUS SYSTEM:  Review of systems was essentially unremarkable. PHYSICAL EXAMINATION:  VITAL SIGNS:  The patient's pulse rate of 92, 167/91, the saturation of 97%, respirations are 26. HEAD, EYES, EARS, NOSE, THROAT EXAMINATION:  Pupils were equal, react to light. Oral mucosa was moist.  NECK:  Supple. LUNGS:  Clear. CARDIOVASCULAR EXAMINATION:  S1, S2 audible. No S3, S4.  ABDOMEN:  Soft, nontender, no guarding, no rigidity, no rebound. EXTREMITIES:  There was no edema. LABORATORY DATA:  The CBC was normal.  CMP was normal.  The CT of the head showed a left parietal hematoma with surrounding edema, measuring 2.2 x 2.6 x 2.3 cm. ASSESSMENT:   1. The patient is a 70-year-old male with no significant past medical history who presents with headaches, found to have a left parietal lobe hematoma. Plan:   A. A neurosurgical consult has been taken. B. The patient is currently on Cardene drip to be titrated to keep the systolic blood pressure less than 160. C. The patient will receive an MRI and MRA scan in the morning to be ruled out for aneurysm as the cause of the bleed. We will monitor closely. 2.  CODE STATUS:  FULL CODE. 3.  DVT prophylaxis. The patient has received SCDs for the DVT prophylaxis.       MD OBI Zimmerman/MN  D: 07/22/2018 21:32     T: 07/22/2018 22:23  JOB #: [id number]

## 2018-07-23 NOTE — PROGRESS NOTES
physical Therapy EVALUATION/DISCHARGE  Patient: Henry Mireles (34 y.o. male)  Date: 7/23/2018  Primary Diagnosis: hematoma  Intracranial bleed (HCC)        Precautions:      ASSESSMENT :  Based on the objective data described below, the patient presents with admission for severe H/A and identified ICH. His reports no change in function since the onset of the H/A at work on Friday. No complaints throughout the session until the very end where he endorsed feeling mildly dizzy and \"Like I'm going to fall down. \" Of note, he has increased trunk sway and altered LE swing on the left but he reports that this is a baseline problem from knee DJD. No LOB observed throughout gait training and he scored a 52/56 on the RILEY balance test. He indicates that this feeling started with his H/A and he noticed during gait today. Discussed that this could be related to his ICH and anticipate these sx to resolve given time. No objective deficits identified. Encouraged him to ambulate with family or nursing again later this evening and encouraged him to ask to speak to therapy again if he has concerns. If this c/o remains persistent or if he notes a change in function compared to his baseline, he may benefit from outpatient PT for balance to address his concerns. Skilled physical therapy is not indicated at this time. PLAN :  Discharge Recommendations: None vs outpatient (see above)  Further Equipment Recommendations for Discharge: None     SUBJECTIVE:   Patient stated I feel a little dizzy.     OBJECTIVE DATA SUMMARY:   HISTORY:    Past Medical History:   Diagnosis Date    Arthritis    No past surgical history on file.   Prior Level of Function/Home Situation: independent and active  Personal factors and/or comorbidities impacting plan of care:     Home Situation  Home Environment: Apartment  # Steps to Enter: 10  One/Two Story Residence: One story  Living Alone: No  Support Systems: Family member(s)  Patient Expects to be Discharged to[de-identified] Private residence  Current DME Used/Available at Home: None  Tub or Shower Type: Tub/Shower combination    EXAMINATION/PRESENTATION/DECISION MAKING:   Critical Behavior:  Neurologic State: Alert, Appropriate for age  Orientation Level: Oriented X4  Cognition: Follows commands, Appropriate safety awareness, Appropriate for age attention/concentration, Appropriate decision making  Safety/Judgement: Awareness of environment, Fall prevention  Hearing: Auditory  Auditory Impairment: None  Skin:    Edema:   Range Of Motion:  AROM: Within functional limits           PROM: Within functional limits           Strength:    Strength:  Within functional limits                    Tone & Sensation:   Tone: Normal              Sensation: Intact               Coordination:  Coordination: Within functional limits  Vision:   Tracking: Able to track stimulus in all quadrants w/o difficulty  Diplopia: No  Acuity: Within Defined Limits  Functional Mobility:  Bed Mobility:     Supine to Sit: Independent  Sit to Supine: Independent     Transfers:  Sit to Stand: Independent  Stand to Sit: Independent                       Balance:   Sitting: Intact  Standing: Intact  Ambulation/Gait Training:  Distance (ft): 300 Feet (ft)  Assistive Device: Gait belt  Ambulation - Level of Assistance: Independent     Gait Description (WDL): Exceptions to WDL  Gait Abnormalities: Trunk sway increased           Stance: Left decreased  Speed/Cyn: Accelerated     Swing Pattern: Left asymmetrical                      Stairs:  Number of Stairs Trained: 8  Stairs - Level of Assistance: Modified independent   Rail Use: Right      Therapeutic Exercises:       Functional Measure:  Cha Balance Test:    Sitting to Standin  Standing Unsupported: 3  Sitting with Back Unsupported: 4  Standing to Sitting: 3  Transfers: 4  Standing Unsupported with Eyes Closed: 4  Standing Unsupported with Feet Together: 4  Reach Forward with Outstretched Arm: 4   Object: 3  Turn to Look Over Shoulders: 4  Turn 360 Degrees: 4  Alternate Foot on Step/Stool: 3  Standing Unsupported One Foot in Front: 4  Stand on One Le  Total: 52         56=Maximum possible score;   0-20=High fall risk  21-40=Moderate fall risk   41-56=Low fall risk     Riley Balance Test and G-code impairment scale:  Percentage of Impairment CH    0%   CI    1-19% CJ    20-39% CK    40-59% CL    60-79% CM    80-99% CN     100%   Riley   Score 0-56 56 45-55 34-44 23-33 12-22 1-11 0           G codes: In compliance with CMSs Claims Based Outcome Reporting, the following G-code set was chosen for this patient based on their primary functional limitation being treated: The outcome measure chosen to determine the severity of the functional limitation was the RILEY with a score of 52/56 which was correlated with the impairment scale. ? Mobility - Walking and Moving Around:     - CURRENT STATUS: CI - 1%-19% impaired, limited or restricted    - GOAL STATUS: CI - 1%-19% impaired, limited or restricted    - D/C STATUS:  CI - 1%-19% impaired, limited or restricted        Physical Therapy Evaluation Charge Determination   History Examination Presentation Decision-Making   LOW Complexity : Zero comorbidities / personal factors that will impact the outcome / POC LOW Complexity : 1-2 Standardized tests and measures addressing body structure, function, activity limitation and / or participation in recreation  LOW Complexity : Stable, uncomplicated  LOW Complexity : FOTO score of       Based on the above components, the patient evaluation is determined to be of the following complexity level: LOW     Pain:  Pain Scale 1: Numeric (0 - 10)  Pain Intensity 1: 0  Pain Location 1: Head  Activity Tolerance:   BP stable throughout  Please refer to the flowsheet for vital signs taken during this treatment.   After treatment:   [x]   Patient left in no apparent distress sitting up in chair  [] Patient left in no apparent distress in bed  [x]   Call bell left within reach  [x]   Nursing notified  []   Caregiver present  []   Bed alarm activated    COMMUNICATION/EDUCATION:   Communication/Collaboration:  [x]   Fall prevention education was provided and the patient/caregiver indicated understanding. []   Patient/family have participated as able and agree with findings and recommendations. []   Patient is unable to participate in plan of care at this time.   Findings and recommendations were discussed with: Registered Nurse    Thank you for this referral.  Albert Sarmiento, PT, DPT   Time Calculation: 26 mins

## 2018-07-23 NOTE — PROGRESS NOTES
Primary Nurse Jennifer William, RN and Félix Smith RN performed a dual skin assessment on this patient No impairment noted  Kojo score is 23

## 2018-07-23 NOTE — PROGRESS NOTES
TRANSFER - IN REPORT:    Verbal report received from ZIA Chahal(name) on Wellington Grant  being received from ICU(unit) for routine progression of care      Report consisted of patients Situation, Background, Assessment and   Recommendations(SBAR). Information from the following report(s) SBAR, Kardex, Intake/Output, MAR, Recent Results and Cardiac Rhythm NSR was reviewed with the receiving nurse. Opportunity for questions and clarification was provided. Assessment completed upon patients arrival to unit and care assumed.

## 2018-07-23 NOTE — PROGRESS NOTES
Bedside and Verbal shift change report given to Floyd Finley RN (oncoming nurse) by Deneen Browne RN (offgoing nurse). Report included the following information SBAR, Kardex, Intake/Output, MAR, Recent Results and Cardiac Rhythm NSR.

## 2018-07-23 NOTE — PROGRESS NOTES
Reason for Admission:  C/O headache, CT: 2 cm left parietal temporal hematoma                    RRAT Score:      2              Plan for utilizing home health:      TBD                    Likelihood of Readmission:  Low                         Transition of Care Plan:    TBD    Care manager met with patient to [facility] role and discuss discharge planning. Patient lives with his girlfriend in an apartment. Patient works as a  for [employer]. He drives himself. Patient confirmed his PCP to be Dr Cresencio Tompkins and uses [facility] as his pharmacy. Care management will follow for potential discharge planning needs. Lizeth Hernandes RN,CRM    Care Management Interventions  PCP Verified by CM:  Yes (Dr Cresencio Tompkins)  MyChart Signup: No  Discharge Durable Medical Equipment: No  Physical Therapy Consult: Yes  Occupational Therapy Consult: Yes  Speech Therapy Consult: No  Current Support Network:  (Mother: Zeferino Godinez [phone])  Confirm Follow Up Transport: Family

## 2018-07-23 NOTE — PROGRESS NOTES
Occupational Therapy neurological EVALUATION with discharge  Patient: Wellington Grant (1 y.o. male)  Date: 7/23/2018  Primary Diagnosis: hematoma  Intracranial bleed (HCC)        Precautions:        ASSESSMENT:  Based on the objective data described below, the patient presents with overall IND to SBA for self-care tasks following admission for dizziness and headache, resulting in left temporal parietal ICH. Pt alert, oriented x4. Pt stuttering intermittently during session (per RN, this is baseline and exacerbates when anxious). Pt scored 66/66 on Fugl Ba Assessment of RUE, indicative of mild to no impairment. Visual testing WFL with no c/o diplopia or blurred vision. Pt with no LOB with simulated dynamic standing tasks and functional mobility within room; PT consulted to assess higher level balance. Pt lives with fiance, drives and works as  in [employer]. Pt with no acute OT needs, please re-assess should pt have decline in safety and ADL function. RN notified. Further skilled acute occupational therapy is not indicated at this time. Discharge Recommendations: None for OT  Further Equipment Recommendations for Discharge: none     SUBJECTIVE:   Patient stated I have a little headache.     OBJECTIVE DATA SUMMARY:   HISTORY:   Past Medical History:   Diagnosis Date    Arthritis    No past surgical history on file. Prior Level of Function/Environment/Context: Pt lives with fiance, drives, works full time in [employer] as . IND.   Occupations in which the patient is/was successful, what are the barriers preventing that success:   Performance Patterns (routines, roles, habits, and rituals):   Personal Interests and/or values:   Expanded or extensive additional review of patient history:     Home Situation  Home Environment: Apartment  # Steps to Enter: 10  One/Two Story Residence: One story  Living Alone: No  Support Systems: Family member(s)  Patient Expects to be Discharged to[de-identified] Private residence  Current DME Used/Available at Home: None  Tub or Shower Type: Tub/Shower combination    Hand dominance: Right    EXAMINATION OF PERFORMANCE DEFICITS:  Cognitive/Behavioral Status:  Neurologic State: Alert; Appropriate for age  Orientation Level: Oriented X4  Cognition: Follows commands; Appropriate safety awareness; Appropriate for age attention/concentration; Appropriate decision making  Perception: Appears intact  Perseveration: No perseveration noted  Safety/Judgement: Awareness of environment; Fall prevention    Skin: appears intact    Edema: none noted in BUEs    Hearing: Auditory  Auditory Impairment: None    Vision/Perceptual:    Tracking: Able to track stimulus in all quadrants w/o difficulty                 Diplopia: No    Acuity: Within Defined Limits         Range of Motion:    AROM: Within functional limits  PROM: Within functional limits                      Strength:    Strength: Within functional limits                Coordination:  Coordination: Within functional limits  Fine Motor Skills-Upper: Left Intact; Right Intact    Gross Motor Skills-Upper: Left Intact; Right Intact    Tone & Sensation:    Tone: Normal  Sensation: Intact                      Balance:  Sitting: Intact  Standing: Intact    Functional Mobility and Transfers for ADLs:  Bed Mobility:  Supine to Sit: Independent  Sit to Supine: Independent    Transfers:  Sit to Stand: Stand-by assistance  Functional Transfers  Toilet Transfer : Stand-by assistance        ADL Assessment:  Feeding: Independent    Oral Facial Hygiene/Grooming: Independent    Bathing: Modified independent    Upper Body Dressing: Independent    Lower Body Dressing: Independent    Toileting: Independent                ADL Intervention and task modifications:                  Pt with 3/3 immediate and delayed recall of memory test words.  Able to correct identify flashlight and verbalize usage, as well as verbalize use of comb, toothbrush/toothpaste; pt declined actual tasks. Cognitive Retraining  Safety/Judgement: Awareness of environment; Fall prevention       Functional Measure:   Fugl-Ba Assessment of Motor Recovery after Stroke:     Reflex Activity  Flexors/Biceps/Fingers: Can be elicited  Extensors/Triceps: Can be elicited  Reflex Subtotal: 4    Volitional Movement Within Synergies  Shoulder Retraction: Full  Shoulder Elevation: Full  Shoulder Abduction (90 degrees): Full  Shoulder External Rotation: Full  Elbow Flexion: Full  Forearm Supination: Full  Shoulder Adduction/Internal Rotation: Full  Elbow Extension: Full  Forearm Pronation: Full  Subtotal: 18    Volitional Movement Mixing Synergies  Hand to Lumbar Spine: Full  Shoulder Flexion (0-90 degrees): Full  Pronation-Supination: Full  Subtotal: 6    Volitional Movement With Little or No Synergy  Shoulder Abduction (0-90 degrees): Full  Shoulder Flexion ( degrees): Full  Pronation/Supination: Full  Subtotal : 6    Normal Reflex Activity  Biceps, Triceps, Finger Flexors: Full  Subtotal : 2    Upper Extremity Total   Upper Extremity Total: 36    Wrist  Stability at 15 Degree Dorsiflexion: Full  Repeated Dorsiflexion/ Volar Flexion: Full  Stability at 15 Degree Dorsiflexion: Full  Repeated Dorsiflexion/ Volar Flexion: Full  Circumduction: Full  Wrist Total: 10    Hand  Mass Flexion: Full  Mass Extension: Full  Grasp A: Full  Grasp B: Full  Grasp C: Full  Grasp D: Full  Grasp E: Full  Hand Total: 14    Coordination/Speed  Tremor: None  Dysmetria: None  Time: <1s  Coordination/Speed Total : 6    Total A-D  Total A-D (Motor Function): 66/66     Percentage of impairment CH  0% CI  1-19% CJ  20-39% CK  40-59% CL  60-79% CM  80-99% CN  100%   Fugl-Ba score: 0-66 66 53-65 39-52 26-38 13-25 1-12   0      This is a reliable/valid measure of arm function after a neurological event.  It has established value to characterize functional status and for measuring spontaneous and therapy-induced recovery; tests proximal and distal motor functions. Fugl-Ba Assessment - UE scores recorded between five and 30 days post neurologic event can be used to predict UE recovery at six months post neurologic event. Severe = 0-21 points   Moderately Severe = 22-33 points   Moderate = 34-47 points   Mild = 48-66 points  JUSTIN Hoover, UDAY Stover, & EMERY Mathews (1992). Measurement of motor recovery after stroke: Outcome assessment and sample size requirements. Stroke, 23, pp. 9029-9748.   ------------------------------------------------------------------------------------------------------------------------------------------------------------------  MCID:  Stroke:   Kiara Smith et al, 2001; n = 171; mean age 79 (5) years; assessed within 16 (12) days of stroke, Acute Stroke)  FMA Motor Scores from Admission to Discharge   10 point increase in FMA Upper Extremity = 1.5 change in discharge FIM   10 point increase in FMA Lower Extremity = 1.9 change in discharge FIM  MDC:   Stroke:   Marilyn Mcfadden et al, 2008, n = 14, mean age = 59.9 (14.6) years, assessed on average 14 (6.5) months post stroke, Chronic Stroke)   FMA = 5.2 points for the Upper Extremity portion of the assessment     G codes: In compliance with CMSs Claims Based Outcome Reporting, the following G-code set was chosen for this patient based on their primary functional limitation being treated: The outcome measure chosen to determine the severity of the functional limitation was the [facility] with a score of 66/66 which was correlated with the impairment scale. ?  Self Care:     - CURRENT STATUS: CH - 0% impaired, limited or restricted    - GOAL STATUS: CH - 0% impaired, limited or restricted    - D/C STATUS:  CH - 0% impaired, limited or restricted      Occupational Therapy Evaluation Charge Determination   History Examination Decision-Making   LOW Complexity : Brief history review  LOW Complexity : 1-3 performance deficits relating to physical, cognitive , or psychosocial skils that result in activity limitations and / or participation restrictions  LOW Complexity : No comorbidities that affect functional and no verbal or physical assistance needed to complete eval tasks       Based on the above components, the patient evaluation is determined to be of the following complexity level: LOW   Barthel Index:    Bathin  Bladder: 10  Bowels: 10  Groomin  Dressing: 10  Feeding: 10  Mobility: 15  Stairs: 10  Toilet Use: 10  Transfer (Bed to Chair and Back): 15  Total: 100       Barthel and G-code impairment scale:  Percentage of impairment CH  0% CI  1-19% CJ  20-39% CK  40-59% CL  60-79% CM  80-99% CN  100%   Barthel Score 0-100 100 99-80 79-60 59-40 20-39 1-19   0   Barthel Score 0-20 20 17-19 13-16 9-12 5-8 1-4 0      The Barthel ADL Index: Guidelines  1. The index should be used as a record of what a patient does, not as a record of what a patient could do. 2. The main aim is to establish degree of independence from any help, physical or verbal, however minor and for whatever reason. 3. The need for supervision renders the patient not independent. 4. A patient's performance should be established using the best available evidence. Asking the patient, friends/relatives and nurses are the usual sources, but direct observation and common sense are also important. However direct testing is not needed. 5. Usually the patient's performance over the preceding 24-48 hours is important, but occasionally longer periods will be relevant. 6. Middle categories imply that the patient supplies over 50 per cent of the effort. 7. Use of aids to be independent is allowed. Aggie Pham., Barthel, D.W. ([id number]). Functional evaluation: the Barthel Index. [facility] (14)2. BRIGITTE Mandel, Ronnell Camargo., Idalmis Hall., Delmi Thomas, [address] [place] [address] ().  Measuring the change indisability after inpatient rehabilitation; comparison of the responsiveness of the Barthel Index and Functional [place] Measure. Journal of Neurology, Neurosurgery, and Psychiatry, 66(4), . BOB Schwartz, NOLAN Campo, & Tracie Smith M.A. (2004.) Assessment of post-stroke quality of life in cost-effectiveness studies: The usefulness of the Barthel Index and the EuroQoL-5D. Quality of Life Research, 13, 269-16       Activity Tolerance:   VSS    Please refer to the flowsheet for vital signs taken during this treatment. After treatment:   []  Patient left in no apparent distress sitting up in chair  [x]  Patient left in no apparent distress in bed  [x]  Call bell left within reach  [x]  Nursing notified  []  Caregiver present  []  Bed alarm activated    COMMUNICATION/EDUCATION:   Findings and recommendations were discussed with: Registered Nurse    [x]      Home safety education was provided and the patient/caregiver indicated understanding. [x]      Patient/family have participated as able and agree with findings and recommendations. []      Patient is unable to participate in plan of care at this time.     Thank you for this referral.  Marzette Skiff, OT  Time Calculation: 20 mins

## 2018-07-23 NOTE — PROGRESS NOTES
Hospitalist Progress Note  Catie Hagen MD  Answering service: [phone] from in house phone      Date of Service:  2018  NAME:  Ary Rincon  :  1980  MRN:  [id number]      Admission Summary:    The patient is a 80-year-old male with no significant past medical history who presents to the [facility] with a 3-day history of constant headache which was about 9/10. The patient had CT scans done which showed a left parietal hematoma. The patient was transferred over to [facility] for further evaluation. The patient was evaluated by a neurosurgeon. Plan is to repeat an MRI scan in the morning. The patient has been receiving Keppra for seizure control, and also the blood pressure is being monitored closely. The patient at this time feels fine. The headache has much improved. Also, the patient, in the [facility], had complained of some nonspecific chest pains for which a CT scan of the chest was done, which was negative. The patient at this time denies have any weakness or numbness or tingling of the upper and lower extremities. Did not complain of any slurring of the speech, visual disturbance or diplopia. Interval history / Subjective:    Patient seen and examined this morning; states headache is much better, c/o mild left knee pain otherwise he is doing okay. Assessment & Plan:     Left parietal temporal ICH  -with no focal neurologic deficit at this time  -MRI/MRA reported intra-axial subacute hematoma in the posterior left  temporal lobe with surrounding vasogenic edema. No evidence of underlying  infarct, mass, or vascular malformation.  Normal MR angiography of the head.  -neurosurgery eval appreciated  -Started on Keppra for seizure px  -Continue neuro check  -Repeat CT head on     -At presentation to [facility] ED his BP was 165/118mmHg, based on this reading, pt does not meet criteria to diagnosed as hypertensive emergency or crisis. Based on the hx pt had the headache for almost three days prior to his presentation and could have significantly elevated BP causing the ICH at the time. Based on MRI report the hematoma is subacute. Cerebral edema: due to [address]  -plan as above  -No steroids     HTN: patient probably has underlying undiagnosed HTN  -Started on metoprolol, in view of underlying frequent PVCs and Trigeminy on tele monitor. PVCs and Trigeminy   -Started on metoprolol  Obtain TTE    Morbid obesity  Nutrition/diet counseling, life style modification. Check lipid profile, HbA1c    Code status: full  DVT prophylaxis: SCD    Care Plan discussed with: Patient/Family and Nurse  Disposition: TBD     Hospital Problems  Date Reviewed: 5/6/2015          Codes Class Noted POA    * (Principal)Intracranial bleed ([facility] [address] ICD-10-CM: I62.9  ICD-9-CM: 432.9  7/22/2018 Unknown                Review of Systems:   A comprehensive review of systems was negative except for that written in the HPI. Vital Signs:    Last 24hrs VS reviewed since prior progress note. Most recent are:  Visit Vitals    /83 (BP 1 Location: Other(comment), BP Patient Position: At rest)    Pulse 71    Temp 98.6 °F (37 °C)    Resp 16    Ht 5' 9.02\" (1.753 m)    Wt 125 kg (275 lb 9.2 oz)    SpO2 98%    BMI 40.7 kg/m2         Intake/Output Summary (Last 24 hours) at 07/23/18 6751  Last data filed at 07/23/18 3822   Gross per 24 hour   Intake             1965 ml   Output             1000 ml   Net              965 ml        Physical Examination:             Constitutional:  No acute distress, cooperative, pleasant    ENT:  Oral mucous moist, oropharynx benign. Neck supple,    Resp:  CTA bilaterally. No wheezing/rhonchi/rales. No accessory muscle use   CV:  Regular rhythm, normal rate, no murmurs, gallops, rubs    GI:  Soft, non distended, non tender. normoactive bowel sounds, no hepatosplenomegaly     Musculoskeletal:  No edema, warm, 2+ pulses throughout    Neurologic:  Moves all extremities. AAOx3, CN II-XII reviewed     Psych:  Good insight, Not anxious nor agitated. Data Review:    Review and/or order of clinical lab test      Labs:     Recent Labs      07/22/18   1434   WBC  7.2   HGB  13.7   HCT  41.8   PLT  246     Recent Labs      07/23/18   0451  07/22/18   1434   NA  139  139   K  4.3  3.8   CL  106  106   CO2  25  26   BUN  12  13   CREA  1.20  1.22   GLU  95  92   CA  8.1*  8.4*     Recent Labs      07/23/18   0451  07/22/18   1434   SGOT  15  14*   ALT  16  18   AP  106  111   TBILI  0.7  0.5   TP  6.8  7.7   ALB  3.0*  3.7   GLOB  3.8  4.0     No results for input(s): INR, PTP, APTT in the last 72 hours. No lab exists for component: INREXT   No results for input(s): FE, TIBC, PSAT, FERR in the last 72 hours. No results found for: FOL, RBCF   No results for input(s): PH, PCO2, PO2 in the last 72 hours.   Recent Labs      07/22/18   1434   TROIQ  <0.05     No results found for: CHOL, CHOLX, CHLST, CHOLV, HDL, LDL, LDLC, DLDLP, TGLX, TRIGL, TRIGP, CHHD, CHHDX  No results found for: [facility]  Lab Results   Component Value Date/Time    Color YELLOW/STRAW 07/22/2018 02:57 PM    Appearance CLEAR 07/22/2018 02:57 PM    Specific gravity 1.022 07/22/2018 02:57 PM    pH (UA) 7.5 07/22/2018 02:57 PM    Protein NEGATIVE  07/22/2018 02:57 PM    Glucose NEGATIVE  07/22/2018 02:57 PM    Ketone NEGATIVE  07/22/2018 02:57 PM    Bilirubin NEGATIVE  07/22/2018 02:57 PM    Urobilinogen 1.0 07/22/2018 02:57 PM    Nitrites NEGATIVE  07/22/2018 02:57 PM    Leukocyte Esterase NEGATIVE  07/22/2018 02:57 PM    Epithelial cells FEW 07/22/2018 02:57 PM    Bacteria NEGATIVE  07/22/2018 02:57 PM    WBC 0-4 07/22/2018 02:57 PM    RBC 0-5 07/22/2018 02:57 PM         Medications Reviewed:     Current Facility-Administered Medications   Medication Dose Route Frequency    levETIRAcetam (KEPPRA) tablet 500 mg  500 mg Oral BID    niCARdipine (CARDENE) 25 mg in 0.9% sodium chloride 250 mL infusion  0-15 mg/hr IntraVENous TITRATE    oxyCODONE-acetaminophen (PERCOCET) 5-325 mg per tablet 1 Tab  1 Tab Oral Q4H PRN    morphine (PF) 1 mg/mL injection 2 mg  2 mg IntraVENous Q3H PRN    hydrALAZINE (APRESOLINE) 20 mg/mL injection 10 mg  10 mg IntraVENous Q6H PRN    labetalol (NORMODYNE;TRANDATE) injection 10 mg  10 mg IntraVENous Q4H PRN    acetaminophen (TYLENOL) tablet 650 mg  650 mg Oral Q4H PRN    0.9% sodium chloride with KCl 20 mEq/L infusion   IntraVENous CONTINUOUS     ______________________________________________________________________  EXPECTED LENGTH OF STAY: - - -  ACTUAL LENGTH OF STAY:          1                 Bandar Cao MD

## 2018-07-23 NOTE — PROGRESS NOTES
Neurosurgery Progress Note  Laverne Juárez, [facility]  [phone]        Admit Date: 2018   LOS: 1 day        Daily Progress Note: 2018        Subjective: The patient developed a sudden onset of headache starting on Friday. He had continued throbbing headaches over the weekend. He went to the Er at [facility] and was found to be hypertensive. He is not treated for hypertension at home. His head CT in the ER revealed a 2 cm left parietal temporal hematoma with vasogenic edema. He was transferred to to [facility] for further evaluation. MRI/MRA brain pending for today. He had a CTA chest yesterday for chest pain and dyspnea, which was negative for PE. Denies numbness, tingling, leg pain, nausea, vomiting, difficulty swallowing. Objective:     Vital signs  Temp (24hrs), Av.1 °F (36.7 °C), Min:97.6 °F (36.4 °C), Max:98.6 °F (37 °C)   701 -  1900  In: 560 [P.O.:360; I.V.:200]  Out: -    190 -  0700  In: 4998 [P.O.:200; I.V.:1205]  Out: 1000 [Urine:1000]    Visit Vitals    /83 (BP 1 Location: Other(comment), BP Patient Position: At rest)  Comment (BP 1 Location): left forearm    Pulse 71    Temp 98.6 °F (37 °C)    Resp 16    Ht 5' 9.02\" (1.753 m)    Wt 125 kg (275 lb 9.2 oz)    SpO2 98%    BMI 40.7 kg/m2      O2 Device: Room air     Pain control  Pain Assessment  Pain Scale 1: Numeric (0 - 10)  Pain Intensity 1: 3  Pain Onset 1: 3 days PTA  Pain Location 1: Head  Pain Orientation 1: Anterior  Pain Description 1: Aching  Pain Intervention(s) 1: Medication (see MAR)    PT/OT  Gait                 Physical Exam:  Gen:NAD. Neuro: A&Ox3. Follows commands. Speech clear. Affect normal.  PERRL. EOMI. Face symmetric. Palate symmetric. Tongue midline. VYAS. Strength 5/5 in UE and LE BL. Negative drift. Gait deferred. CT head without contrast on 18 shows left parietal hematoma with surrounding edema measuring 2.2 x 2.6 x 2.3 cm.     24 hour results:    Recent Results (from the past 24 hour(s))   EKG, 12 LEAD, INITIAL    Collection Time: 07/22/18  1:55 PM   Result Value Ref Range    Ventricular Rate 81 BPM    Atrial Rate 81 BPM    P-R Interval 144 ms    QRS Duration 116 ms    Q-T Interval 370 ms    QTC Calculation (Bezet) 429 ms    Calculated P Axis 64 degrees    Calculated R Axis 58 degrees    Calculated T Axis 52 degrees    Diagnosis       Sinus rhythm with occasional premature ventricular complexes  Incomplete right bundle branch block  No previous ECGs available     CBC WITH AUTOMATED DIFF    Collection Time: 07/22/18  2:34 PM   Result Value Ref Range    WBC 7.2 4.1 - 11.1 K/uL    RBC 4.60 4.10 - 5.70 M/uL    HGB 13.7 12.1 - 17.0 g/dL    HCT 41.8 36.6 - 50.3 %    MCV 90.9 80.0 - 99.0 FL    MCH 29.8 26.0 - 34.0 PG    MCHC 32.8 30.0 - 36.5 g/dL    RDW 13.5 11.5 - 14.5 %    PLATELET 787 507 - 631 K/uL    MPV 9.8 8.9 - 12.9 FL    NRBC 0.0 0  WBC    ABSOLUTE NRBC 0.00 0.00 - 0.01 K/uL    NEUTROPHILS 58 32 - 75 %    LYMPHOCYTES 34 12 - 49 %    MONOCYTES 6 5 - 13 %    EOSINOPHILS 1 0 - 7 %    BASOPHILS 1 0 - 1 %    IMMATURE GRANULOCYTES 0 0.0 - 0.5 %    ABS. NEUTROPHILS 4.2 1.8 - 8.0 K/UL    ABS. LYMPHOCYTES 2.5 0.8 - 3.5 K/UL    ABS. MONOCYTES 0.5 0.0 - 1.0 K/UL    ABS. EOSINOPHILS 0.1 0.0 - 0.4 K/UL    ABS. BASOPHILS 0.0 0.0 - 0.1 K/UL    ABS. IMM.  GRANS. 0.0 0.00 - 0.04 K/UL    DF AUTOMATED     METABOLIC PANEL, COMPREHENSIVE    Collection Time: 07/22/18  2:34 PM   Result Value Ref Range    Sodium 139 136 - 145 mmol/L    Potassium 3.8 3.5 - 5.1 mmol/L    Chloride 106 97 - 108 mmol/L    CO2 26 21 - 32 mmol/L    Anion gap 7 5 - 15 mmol/L    Glucose 92 65 - 100 mg/dL    BUN 13 6 - 20 MG/DL    Creatinine 1.22 0.70 - 1.30 MG/DL    BUN/Creatinine ratio 11 (L) 12 - 20      GFR est AA >60 >60 ml/min/1.73m2    GFR est non-AA >60 >60 ml/min/1.73m2    Calcium 8.4 (L) 8.5 - 10.1 MG/DL    Bilirubin, total 0.5 0.2 - 1.0 MG/DL    ALT (SGPT) 18 12 - 78 U/L    AST (SGOT) 14 (L) 15 - 37 U/L Alk. phosphatase 111 45 - 117 U/L    Protein, total 7.7 6.4 - 8.2 g/dL    Albumin 3.7 3.5 - 5.0 g/dL    Globulin 4.0 2.0 - 4.0 g/dL    A-G Ratio 0.9 (L) 1.1 - 2.2     TROPONIN I    Collection Time: 07/22/18  2:34 PM   Result Value Ref Range    Troponin-I, Qt. <0.05 <0.05 ng/mL   URINALYSIS W/ REFLEX CULTURE    Collection Time: 07/22/18  2:57 PM   Result Value Ref Range    Color YELLOW/STRAW      Appearance CLEAR CLEAR      Specific gravity 1.022 1.003 - 1.030      pH (UA) 7.5 5.0 - 8.0      Protein NEGATIVE  NEG mg/dL    Glucose NEGATIVE  NEG mg/dL    Ketone NEGATIVE  NEG mg/dL    Bilirubin NEGATIVE  NEG      Blood NEGATIVE  NEG      Urobilinogen 1.0 0.2 - 1.0 EU/dL    Nitrites NEGATIVE  NEG      Leukocyte Esterase NEGATIVE  NEG      WBC 0-4 0 - 4 /hpf    RBC 0-5 0 - 5 /hpf    Epithelial cells FEW FEW /lpf    Bacteria NEGATIVE  NEG /hpf    UA:UC IF INDICATED CULTURE NOT INDICATED BY UA RESULT CNI      Hyaline cast 0-2 0 - 5 /lpf   EKG, 12 LEAD, INITIAL    Collection Time: 07/22/18  7:47 PM   Result Value Ref Range    Ventricular Rate 67 BPM    Atrial Rate 67 BPM    P-R Interval 152 ms    QRS Duration 102 ms    Q-T Interval 404 ms    QTC Calculation (Bezet) 426 ms    Calculated R Axis 173 degrees    Calculated T Axis 153 degrees    Diagnosis       Sinus rhythm with sinus arrhythmia with occasional premature ventricular   complexes  Right axis deviation  Nonspecific ST and T wave abnormality  When compared with ECG of 22-JUL-2018 13:55,  Incomplete right bundle branch block is no longer present     METABOLIC PANEL, COMPREHENSIVE    Collection Time: 07/23/18  4:51 AM   Result Value Ref Range    Sodium 139 136 - 145 mmol/L    Potassium 4.3 3.5 - 5.1 mmol/L    Chloride 106 97 - 108 mmol/L    CO2 25 21 - 32 mmol/L    Anion gap 8 5 - 15 mmol/L    Glucose 95 65 - 100 mg/dL    BUN 12 6 - 20 MG/DL    Creatinine 1.20 0.70 - 1.30 MG/DL    BUN/Creatinine ratio 10 (L) 12 - 20      GFR est AA >60 >60 ml/min/1.73m2    GFR est non-AA >60 >60 ml/min/1.73m2    Calcium 8.1 (L) 8.5 - 10.1 MG/DL    Bilirubin, total 0.7 0.2 - 1.0 MG/DL    ALT (SGPT) 16 12 - 78 U/L    AST (SGOT) 15 15 - 37 U/L    Alk. phosphatase 106 45 - 117 U/L    Protein, total 6.8 6.4 - 8.2 g/dL    Albumin 3.0 (L) 3.5 - 5.0 g/dL    Globulin 3.8 2.0 - 4.0 g/dL    A-G Ratio 0.8 (L) 1.1 - 2.2            Assessment:     Active Problems:    Intracranial bleed ([address] (7/22/2018)        Plan:   1. Left parietal temporal ICH   - MRI/MRA brain negative for tumor or vascular lesion   - Neuro checks   - Mobilize   - PT/OT evals for ICH pathway   - Keppra for sz ppx   - ok to transfer to [facility]   - Repeat CT head on Wednesday  2. Cerebral edema   - due to #1   - steroids not indicated  3. HTN   - Pt likely needs to be started on low dose BP med.  Will defer to the hospitalist about medical management of the BP   - Labetalol/hydralazine PRN    Activity: up with assist  DVT ppx: SCDs  Dispo: tbd    Plan d/w Dr. Leigh Ruiz, ICU nurse      Lyn Krishnan NP

## 2018-07-24 PROCEDURE — 74011250637 HC RX REV CODE- 250/637: Performed by: INTERNAL MEDICINE

## 2018-07-24 PROCEDURE — 74011250637 HC RX REV CODE- 250/637: Performed by: NURSE PRACTITIONER

## 2018-07-24 PROCEDURE — 74011250637 HC RX REV CODE- 250/637: Performed by: NEUROLOGICAL SURGERY

## 2018-07-24 PROCEDURE — 65660000000 HC RM CCU STEPDOWN

## 2018-07-24 RX ORDER — BUTALBITAL, ACETAMINOPHEN AND CAFFEINE 50; 325; 40 MG/1; MG/1; MG/1
1 TABLET ORAL
Status: DISCONTINUED | OUTPATIENT
Start: 2018-07-24 | End: 2018-07-25 | Stop reason: HOSPADM

## 2018-07-24 RX ORDER — AMLODIPINE BESYLATE 5 MG/1
5 TABLET ORAL DAILY
Status: DISCONTINUED | OUTPATIENT
Start: 2018-07-24 | End: 2018-07-25 | Stop reason: HOSPADM

## 2018-07-24 RX ADMIN — OXYCODONE HYDROCHLORIDE AND ACETAMINOPHEN 1 TABLET: 5; 325 TABLET ORAL at 13:51

## 2018-07-24 RX ADMIN — AMLODIPINE BESYLATE 5 MG: 5 TABLET ORAL at 13:47

## 2018-07-24 RX ADMIN — METOPROLOL TARTRATE 25 MG: 25 TABLET ORAL at 21:15

## 2018-07-24 RX ADMIN — LEVETIRACETAM 500 MG: 500 TABLET ORAL at 21:15

## 2018-07-24 RX ADMIN — ACETAMINOPHEN 650 MG: 325 TABLET ORAL at 10:32

## 2018-07-24 RX ADMIN — LEVETIRACETAM 500 MG: 500 TABLET ORAL at 08:54

## 2018-07-24 RX ADMIN — METOPROLOL TARTRATE 25 MG: 25 TABLET ORAL at 08:54

## 2018-07-24 NOTE — PROGRESS NOTES
Pt discussed in [address] rounds. Per therapy notes, pt will not need any ongoing therapy. Will follow for any other possible needs.  Awilda Romoe

## 2018-07-24 NOTE — PROGRESS NOTES
Neurosurgery Progress Note  Jermain Martínez  [phone]        Admit Date: 2018   LOS: 2 days        Daily Progress Note: 2018    HPI: The patient developed a sudden onset of headache starting on Friday. He had continued throbbing headaches over the weekend. He went to the ER at [facility] and was found to be hypertensive. He is not treated for hypertension at home. His head CT in the ER revealed a 2 cm left parietal temporal hematoma with vasogenic edema. He was transferred to to [facility] for further evaluation. MRI/MRA brain pending for today. He had a CTA chest yesterday for chest pain and dyspnea, which was negative for PE. Subjective:   No acute events overnight. BP better controlled on Metoprolol. Minimal headache. Denies numbness, tingling, leg pain, nausea, vomiting, difficulty swallowing. Objective:     Vital signs  Temp (24hrs), Av.3 °F (36.8 °C), Min:98 °F (36.7 °C), Max:98.5 °F (36.9 °C)      1901 -  0700  In: 8035 [P.O.:800; I.V.:1505]  Out: 1200 [Urine:1200]    Visit Vitals    /90 (BP 1 Location: Right arm, BP Patient Position: At rest)    Pulse 84    Temp 98.2 °F (36.8 °C)    Resp 21    Ht 5' 9.02\" (1.753 m)    Wt 125.3 kg (276 lb 3.8 oz)    SpO2 98%    BMI 40.79 kg/m2      O2 Device: Room air     Pain control  Pain Assessment  Pain Scale 1: Numeric (0 - 10)  Pain Intensity 1: 0  Pain Onset 1: 3 days PTA  Pain Location 1: Head  Pain Orientation 1: Anterior  Pain Description 1: Aching  Pain Intervention(s) 1: Medication (see MAR)    PT/OT  Gait     Gait  Speed/Cyn: Accelerated  Swing Pattern: Left asymmetrical  Stance: Left decreased  Gait Abnormalities: Trunk sway increased  Ambulation - Level of Assistance: Independent  Distance (ft): 300 Feet (ft)  Assistive Device: Gait belt  Rail Use: Right   Stairs - Level of Assistance: Modified independent  Number of Stairs Trained: 8           Physical Exam:  Gen:NAD. Neuro: A&Ox3. Follows commands. Speech clear. Affect normal.  PERRL. EOMI. Face symmetric. Palate symmetric. Tongue midline. VYAS. Strength 5/5 in UE and LE BL. Negative drift. Gait deferred. MRI brain with and without contrast on 07/23/18 shows unchanged 10.7 mL intra-axial subacute hematoma in the posterior left temporal lobe. Unchanged surrounding vasogenic edema. No evidence of underlying  infarct, mass, or vascular malformation. Normal MR angiography of the head. 24 hour results:    No results found for this or any previous visit (from the past 24 hour(s)). Assessment:     Principal Problem:    Intracranial bleed ([facility] [address] (7/22/2018)        Plan:   1. Left parietal temporal ICH   - MRI/MRA brain negative for tumor or vascular lesion   - Neuro checks   - Mobilize   - Keppra for sz ppx   - CTA head in am  2. Cerebral edema   - due to #1   - steroids not indicated  3. HTN crisis   - SBP<150   - Metoprolol started   - Labetalol/hydralazine PRN  4. Morbidly obese   - BMI 40.7   - Counseling as able    Activity: up with assist  DVT ppx: SCDs  Dispo: tbd    Plan d/w Dr. Michael Montes. May be ready to discharge to home in next day or so if BP controlled and CTA negative.       Melva Gonzales NP

## 2018-07-24 NOTE — PROGRESS NOTES
Bedside shift change report given to Aquilino Gonzalez (oncoming nurse) by Deepti Owusu (offgoing nurse). Report included the following information SBAR, Kardex, Procedure Summary, MAR, Accordion, Recent Results and Cardiac Rhythm NSR;PVC.

## 2018-07-24 NOTE — PROGRESS NOTES
Problem: Falls - Risk of  Goal: *Absence of Falls  Document Marie Fall Risk and appropriate interventions in the flowsheet. Outcome: Progressing Towards Goal  Fall Risk Interventions:            Medication Interventions: Patient to call before getting OOB, Teach patient to arise slowly                  Problem: Pressure Injury - Risk of  Goal: *Prevention of pressure injury  Document Kojo Scale and appropriate interventions in the flowsheet.   Outcome: Progressing Towards Goal  Pressure Injury Interventions:                      Nutrition Interventions: Document food/fluid/supplement intake

## 2018-07-24 NOTE — PROGRESS NOTES
Hospitalist Progress Note  Tim Bingham MD  Answering service: [phone] OR 36 from in house phone         Date of Service:  2018  NAME:  Kirsty Gallego  :  1980  MRN:  [id number]    Admission Summary:    The patient is a 70-year-old male with no significant past medical history who presents to the [address] [place] [address] with a 3-day history of constant headache which was about 9/10.  The patient had CT scans done which showed a left parietal hematoma.  The patient was transferred over to [facility] for further evaluation.  The patient was evaluated by a neurosurgeon. Mike De La Cruz is to repeat an MRI scan in the morning.  The patient has been receiving Keppra for seizure control, and also the blood pressure is being monitored closely.  The patient at this time feels fine.  The headache has much improved.  Also, the patient, in the [address] [place] [address], had complained of some nonspecific chest pains for which a CT scan of the chest was done, which was negative.  The patient at this time denies have any weakness or numbness or tingling of the upper and lower extremities.  Did not complain of any slurring of the speech, visual disturbance or diplopia.     Interval history / Subjective:        2018 :   No headache, no cp no sob.       Assessment & Plan:      Left parietal temporal ICH  -with no focal neurologic deficit at this time  -MRI/MRA reported intra-axial subacute hematoma in the posterior left  temporal lobe with surrounding vasogenic edema. No evidence of underlying  infarct, mass, or vascular malformation.  Normal MR angiography of the head.  -neurosurgery eval appreciated  -Started on Keppra for seizure px  -Continue neuro check  -Repeat CT head on      -At presentation to [address] [place] [address] ED his BP was 165/118mmHg, based on this reading, pt does not meet criteria to diagnosed as hypertensive emergency or crisis. Based on the hx pt had the headache for almost three days prior to his presentation and could have significantly elevated BP causing the ICH at the time. Based on MRI report the hematoma is subacute.         Cerebral edema: due to [address]  -plan as above  -No steroids      HTN: patient probably has underlying undiagnosed HTN  -Started on metoprolol, in view of underlying frequent PVCs and Trigeminy on tele monitor.  - added Norvasc 5 mg a day 7/24/2018      PVCs and Trigeminy   -Started on metoprolol  Obtain TTE     Morbid obesity  Nutrition/diet counseling, life style modification. Check lipid profile, HbA1c     Code status: full  DVT prophylaxis: SCD     Care Plan discussed with: Patient/Family and Nurse  Disposition: TBD          Hospital Problems  Date Reviewed: 5/6/2015          Codes Class Noted POA    * (Principal)Intracranial bleed ([address] ICD-10-CM: I62.9  ICD-9-CM: 432.9  7/22/2018 Unknown                Review of Systems:   no cp no sob no n/v/       Vital Signs:    Last 24hrs VS reviewed since prior progress note. Most recent are:  Visit Vitals    /80 (BP 1 Location: Right arm, BP Patient Position: At rest)    Pulse 66    Temp 98.1 °F (36.7 °C)    Resp 18    Ht 5' 9.02\" (1.753 m)    Wt 125.3 kg (276 lb 3.8 oz)    SpO2 98%    BMI 40.79 kg/m2       No intake or output data in the 24 hours ending 07/24/18 1437     Physical Examination:             Constitutional:  No acute distress, cooperative, pleasant    ENT:  Oral mucous moist, oropharynx benign. Neck supple,    Resp:  CTA bilaterally. No wheezing/rhonchi/rales. No accessory muscle use   CV:  Regular rhythm, normal rate, no murmurs, gallops, rubs    GI:  Soft, non distended, non tender. normoactive bowel sounds, no hepatosplenomegaly     Musculoskeletal:  No edema, warm, 2+ pulses throughout    Neurologic:  Moves all extremities.   AAOx3, CN II-XII reviewed     Psych:  Good insight, Not anxious nor agitated. Skin:  Good turgor, no rashes or ulcers       Data Review:    Review and/or order of clinical lab test      Labs:     Recent Labs      07/22/18   1434   WBC  7.2   HGB  13.7   HCT  41.8   PLT  246     Recent Labs      07/23/18   0451  07/22/18   1434   NA  139  139   K  4.3  3.8   CL  106  106   CO2  25  26   BUN  12  13   CREA  1.20  1.22   GLU  95  92   CA  8.1*  8.4*     Recent Labs      07/23/18   0451  07/22/18   1434   SGOT  15  14*   ALT  16  18   AP  106  111   TBILI  0.7  0.5   TP  6.8  7.7   ALB  3.0*  3.7   GLOB  3.8  4.0     No results for input(s): INR, PTP, APTT in the last 72 hours. No lab exists for component: INREXT   No results for input(s): FE, TIBC, PSAT, FERR in the last 72 hours. No results found for: FOL, RBCF   No results for input(s): PH, PCO2, PO2 in the last 72 hours.   Recent Labs      07/22/18   1434   TROIQ  <0.05     No results found for: CHOL, CHOLX, CHLST, CHOLV, HDL, LDL, LDLC, DLDLP, TGLX, TRIGL, TRIGP, CHHD, CHHDX  No results found for: [facility]  Lab Results   Component Value Date/Time    Color YELLOW/STRAW 07/22/2018 02:57 PM    Appearance CLEAR 07/22/2018 02:57 PM    Specific gravity 1.022 07/22/2018 02:57 PM    pH (UA) 7.5 07/22/2018 02:57 PM    Protein NEGATIVE  07/22/2018 02:57 PM    Glucose NEGATIVE  07/22/2018 02:57 PM    Ketone NEGATIVE  07/22/2018 02:57 PM    Bilirubin NEGATIVE  07/22/2018 02:57 PM    Urobilinogen 1.0 07/22/2018 02:57 PM    Nitrites NEGATIVE  07/22/2018 02:57 PM    Leukocyte Esterase NEGATIVE  07/22/2018 02:57 PM    Epithelial cells FEW 07/22/2018 02:57 PM    Bacteria NEGATIVE  07/22/2018 02:57 PM    WBC 0-4 07/22/2018 02:57 PM    RBC 0-5 07/22/2018 02:57 PM         Medications Reviewed:     Current Facility-Administered Medications   Medication Dose Route Frequency    amLODIPine (NORVASC) tablet 5 mg  5 mg Oral DAILY    levETIRAcetam (KEPPRA) tablet 500 mg  500 mg Oral BID    hydrALAZINE (APRESOLINE) 20 mg/mL injection 10 mg  10 mg IntraVENous Q4H PRN    labetalol (NORMODYNE;TRANDATE) injection 10 mg  10 mg IntraVENous Q4H PRN    metoprolol tartrate (LOPRESSOR) tablet 25 mg  25 mg Oral Q12H    oxyCODONE-acetaminophen (PERCOCET) 5-325 mg per tablet 1 Tab  1 Tab Oral Q4H PRN    morphine (PF) 1 mg/mL injection 2 mg  2 mg IntraVENous Q3H PRN    acetaminophen (TYLENOL) tablet 650 mg  650 mg Oral Q4H PRN     ______________________________________________________________________  EXPECTED LENGTH OF STAY: 4d 9h  ACTUAL LENGTH OF STAY:          2                 Zainab George MD

## 2018-07-24 NOTE — PROGRESS NOTES
07/24/18 1535   Vital Signs   BP (!) 133/91       Pt pressed call bell to notify RN of onset of dizziness. Pt resting quietly in chair. PRN pain medicine had been administered (see MAR). Pt denies pain. No other complaints. Focal neuro exam, within patients normal limits. Made neurosx NP aware. Will continue to monitor and assess. 1555- Reassessment, no changes. Dizziness persisting. Call bell within reach.

## 2018-07-24 NOTE — PROGRESS NOTES
Bedside shift change report given to [facility] (oncoming nurse) by Catherine Murry (offgoing nurse). Report included the following information SBAR, Kardex, Intake/Output, MAR and Cardiac Rhythm NSR, PVCs.

## 2018-07-24 NOTE — PROGRESS NOTES
Pt's nurse asked CM to see pt regarding his d/c meds. Per pt's nurse, he stated that he may have a hard time getting his d/c meds filled. CM reviewed chart and noted that this pt does currently have health insurance. CM offered pt information on the one medication, Lopressor, as it is on the 4 dollar list at [employer]. CM also found a coupon on [employer] for the Norvasc. With this coupon, pt would need to go to [place] and the price would be 7.00. This pt agreed that this is helpful information.  Richard Kussmaul

## 2018-07-25 ENCOUNTER — APPOINTMENT (OUTPATIENT)
Dept: CT IMAGING | Age: 38
DRG: 064 | End: 2018-07-25
Attending: NURSE PRACTITIONER
Payer: COMMERCIAL

## 2018-07-25 VITALS
TEMPERATURE: 98.3 F | HEIGHT: 69 IN | WEIGHT: 266.76 LBS | BODY MASS INDEX: 39.51 KG/M2 | DIASTOLIC BLOOD PRESSURE: 78 MMHG | HEART RATE: 65 BPM | RESPIRATION RATE: 16 BRPM | OXYGEN SATURATION: 98 % | SYSTOLIC BLOOD PRESSURE: 140 MMHG

## 2018-07-25 PROCEDURE — 74011250637 HC RX REV CODE- 250/637: Performed by: NURSE PRACTITIONER

## 2018-07-25 PROCEDURE — 74011250637 HC RX REV CODE- 250/637: Performed by: INTERNAL MEDICINE

## 2018-07-25 PROCEDURE — 74011636320 HC RX REV CODE- 636/320: Performed by: INTERNAL MEDICINE

## 2018-07-25 PROCEDURE — 70496 CT ANGIOGRAPHY HEAD: CPT

## 2018-07-25 PROCEDURE — 74011000258 HC RX REV CODE- 258: Performed by: INTERNAL MEDICINE

## 2018-07-25 RX ORDER — AMLODIPINE BESYLATE 5 MG/1
5 TABLET ORAL DAILY
Qty: 30 TAB | Refills: 3 | Status: SHIPPED | OUTPATIENT
Start: 2018-07-26 | End: 2018-09-04 | Stop reason: SDUPTHER

## 2018-07-25 RX ORDER — BUTALBITAL, ACETAMINOPHEN AND CAFFEINE 50; 325; 40 MG/1; MG/1; MG/1
1 TABLET ORAL
Qty: 20 TAB | Refills: 0 | Status: SHIPPED | OUTPATIENT
Start: 2018-07-25 | End: 2018-09-04

## 2018-07-25 RX ORDER — TRIAMCINOLONE ACETONIDE 40 MG/ML
40 INJECTION, SUSPENSION INTRA-ARTICULAR; INTRAMUSCULAR
Status: DISCONTINUED | OUTPATIENT
Start: 2018-07-25 | End: 2018-07-25

## 2018-07-25 RX ORDER — METOPROLOL TARTRATE 25 MG/1
50 TABLET, FILM COATED ORAL EVERY 12 HOURS
Qty: 60 TAB | Refills: 0 | Status: SHIPPED | OUTPATIENT
Start: 2018-07-25 | End: 2018-07-25

## 2018-07-25 RX ORDER — METOPROLOL TARTRATE 50 MG/1
50 TABLET ORAL 2 TIMES DAILY
Qty: 60 TAB | Refills: 2 | Status: SHIPPED | OUTPATIENT
Start: 2018-07-25 | End: 2018-09-04 | Stop reason: SDUPTHER

## 2018-07-25 RX ORDER — HYDROCHLOROTHIAZIDE 12.5 MG/1
12.5 TABLET ORAL DAILY
Qty: 30 TAB | Refills: 0 | Status: SHIPPED | OUTPATIENT
Start: 2018-07-25 | End: 2018-09-04 | Stop reason: SDUPTHER

## 2018-07-25 RX ORDER — SODIUM CHLORIDE 0.9 % (FLUSH) 0.9 %
10 SYRINGE (ML) INJECTION
Status: COMPLETED | OUTPATIENT
Start: 2018-07-25 | End: 2018-07-25

## 2018-07-25 RX ORDER — LEVETIRACETAM 500 MG/1
500 TABLET ORAL 2 TIMES DAILY
Qty: 60 TAB | Refills: 0 | Status: SHIPPED | OUTPATIENT
Start: 2018-07-25 | End: 2018-09-04

## 2018-07-25 RX ADMIN — METOPROLOL TARTRATE 25 MG: 25 TABLET ORAL at 09:20

## 2018-07-25 RX ADMIN — Medication 20 ML: at 08:45

## 2018-07-25 RX ADMIN — IOPAMIDOL 100 ML: 755 INJECTION, SOLUTION INTRAVENOUS at 08:45

## 2018-07-25 RX ADMIN — LEVETIRACETAM 500 MG: 500 TABLET ORAL at 09:20

## 2018-07-25 RX ADMIN — SODIUM CHLORIDE 100 ML: 900 INJECTION, SOLUTION INTRAVENOUS at 08:45

## 2018-07-25 RX ADMIN — AMLODIPINE BESYLATE 5 MG: 5 TABLET ORAL at 09:20

## 2018-07-25 NOTE — PROGRESS NOTES
[address] July 25, 2018       RE: Kaden Major      To Whom It May Concern,    This is to certify that Kaden Major may may return to work on 08/12/2018. Please feel free to contact my office if you have any questions or concerns. Thank you for your assistance in this matter.       Sincerely,  Sandhya Burt MD

## 2018-07-25 NOTE — PROGRESS NOTES
Patient has not been to Dr. Jerod Stanford office since 2015, he will now be established at [facility]. New PCP appointment on Monday July 30,2018 @ 11:00 a.m., with Dr. Jerod Stanford.     Added to AVS.  Luz Elena Grubbs CM Specialist

## 2018-07-25 NOTE — DISCHARGE INSTRUCTIONS
Discharge Instructions       PATIENT ID: Dennise Mcnulty  MRN: [id number]   YOB: 1980    DATE OF ADMISSION: 7/22/2018  6:59 PM    DATE OF DISCHARGE: 7/25/2018    PRIMARY CARE PROVIDER: Nirav Andre MD     ATTENDING PHYSICIAN: Oniel Leach MD  DISCHARGING PROVIDER: Oniel Leach MD    To contact this individual call [phone] and ask the  to page. If unavailable ask to be transferred the Adult Hospitalist Department. DISCHARGE DIAGNOSES intracranial bleed. CONSULTATIONS: None    PROCEDURES/SURGERIES: * No surgery found *    PENDING TEST RESULTS:   At the time of discharge the following test results are still pending: na    FOLLOW UP APPOINTMENTS:   Follow-up Information     Follow up With Details Comments [address] [facility], DO Go on 8/7/2018 for hemorrhagic stroke follow-up at 2:00. Arrive at 1:30. Bring photo ID, insurance card, co-pay ($45), list of medications [id number] [facility] at [address] [facility].  Daisha Glaser MD   [address] [facility] [phone]             ADDITIONAL CARE RECOMMENDATIONS: na    DIET: Cardiac Diet     ACTIVITY: back to work in w weeks. No heavy lifting, no over exertion     WOUND CARE: na    EQUIPMENT needed: na      DISCHARGE MEDICATIONS:   See Medication Reconciliation Form    · It is important that you take the medication exactly as they are prescribed. · Keep your medication in the bottles provided by the pharmacist and keep a list of the medication names, dosages, and times to be taken in your wallet. · Do not take other medications without consulting your doctor. NOTIFY YOUR PHYSICIAN FOR ANY OF THE FOLLOWING:   Fever over 101 degrees for 24 hours. Chest pain, shortness of breath, fever, chills, nausea, vomiting, diarrhea, change in mentation, falling, weakness, bleeding.  Severe pain or pain not relieved by medications. Or, any other signs or symptoms that you may have questions about.       DISPOSITION:    Home With:   OT  PT  HH  RN       SNF/Inpatient Rehab/LTAC   x Independent/assisted living    Hospice    Other:          Signed:   Adriana Cha MD  7/25/2018  11:09 AM

## 2018-07-25 NOTE — PROGRESS NOTES
Bedside shift change report given to Clay Le (oncoming nurse) by Jd Ambrocio (offgoing nurse). Report included the following information SBAR, Procedure Summary, Intake/Output, MAR and Recent Results.

## 2018-07-25 NOTE — DISCHARGE SUMMARY
Discharge Summary       PATIENT ID: Wellington Grant  MRN: [id number]   YOB: 1980    DATE OF ADMISSION: 7/22/2018  6:59 PM    DATE OF DISCHARGE: 7/25/2018    PRIMARY CARE PROVIDER: Jd Perez MD     ATTENDING PHYSICIAN: Sarita Whyte MD   DISCHARGING PROVIDER: Sarita Whyte MD    To contact this individual call [phone] and ask the  to page. If unavailable ask to be transferred the Adult Hospitalist Department. CONSULTATIONS: None    PROCEDURES/SURGERIES: * No surgery found *    ADMITTING DIAGNOSES & HOSPITAL COURSE:     As per neurosurgery:     HPI: The patient developed a sudden onset of headache starting on Friday. He had continued throbbing headaches over the weekend. [facility] and was found to be [facility]. He is not treated for hypertension at home. His head CT in the ER revealed a 2 cm left parietal temporal hematoma with vasogenic edema. He was transferred to to [facility] for further evaluation. MRI/MRA brain pending for today. He had a CTA chest yesterday for chest pain and dyspnea, which was negative for PE.       Assessment:      Principal Problem:    Intracranial bleed ([address] (7/22/2018)           Plan:   1. Left parietal temporal ICH                        - MRI/MRA brain negative for tumor or vascular lesion                        - Neuro checks                        - Mobilize                        - Keppra for sz ppx - needs to be discharged on Keppra 500 mg PO bid x 2 weeks. - CTA head stable                        - No surgical intervention warranted. Will have the patient follow-up with neurology as an outpatient for a hypercoaguable work-up for stroke since he is young. Likely hypertensive bleed but want to rule everything out. Outpatient appt for Dr. Maris Correa placed on the chart and discussed necessity of the appt with the patient.   2. Cerebral edema                        - due to #1                        - steroids not indicated  3. HTN crisis                        - SBP<150                        - Cont metoprolol and norvasc. Discussed importance of compliance with BP meds with patient and need for f/u with PCP after discharge to prevent this from happening again with worse outcomes. Also advised him to get a home BP cuff to monitor his BP at home and keep a log for his PCP                        - Labetalol/hydralazine PRN    [ have added po Norvasc 7/24 and today on discharge 7/25/2018 have increased BB to metroprolol 50  mg bid Brooke Coffey MD 7/25/2018 ]   4. Morbidly obese                        - BMI 40.7                        - Counseling as able     Activity: up with assist  DVT ppx: SCDs  Dispo: home     Plan d/w Dr. Artemio Mclain. Ok to d/c to home from neurosurgery standpoint. Discussed the differences between hemorrhagic stroke and ischemic stroke and the importance of medication compliance and good follow-up with providers. He verbalized understanding.        Aruna Monterroso NP                        litte to add to above. DISCHARGE DIAGNOSES / PLAN:      1.  as above        PENDING TEST RESULTS:   At the time of discharge the following test results are still pending: na    FOLLOW UP APPOINTMENTS:    Follow-up Information     Follow up With Details Comments [facility], DO Go on 8/7/2018 for hemorrhagic stroke follow-up at 2:00. Arrive at 1:30. Bring photo ID, insurance card, co-pay ($45), list of medications [phone] [facility] at [address] [place] [address] 8034      Clark Barclay MD On 7/30/2018 New PCP appointment on Monday July 30 @ 11:00 a.m. [address] [address]  [phone]             ADDITIONAL CARE RECOMMENDATIONS: na    DIET: Cardiac Diet     ACTIVITY: back to work in w weeks.  No heavy lifting, no over exertion     WOUND CARE: na    EQUIPMENT needed: na      DISCHARGE MEDICATIONS:  Current Discharge Medication List      START taking these medications    Details   amLODIPine (NORVASC) 5 mg tablet Take 1 Tab by mouth daily. Qty: 30 Tab, Refills: 3      butalbital-acetaminophen-caffeine (FIORICET, ESGIC) -40 mg per tablet Take 1 Tab by mouth every four (4) hours as needed for Headache. Qty: 20 Tab, Refills: 0    Associated Diagnoses: Intracranial bleed (HCC)      levETIRAcetam (KEPPRA) 500 mg tablet Take 1 Tab by mouth two (2) times a day. Qty: 60 Tab, Refills: 0      metoprolol tartrate (LOPRESSOR) 50 mg tablet Take 1 Tab by mouth two (2) times a day. Qty: 60 Tab, Refills: 2      hydroCHLOROthiazide (HYDRODIURIL) 12.5 mg tablet Take 1 Tab by mouth daily. Qty: 30 Tab, Refills: 0               NOTIFY YOUR PHYSICIAN FOR ANY OF THE FOLLOWING:   Fever over 101 degrees for 24 hours. Chest pain, shortness of breath, fever, chills, nausea, vomiting, diarrhea, change in mentation, falling, weakness, bleeding. Severe pain or pain not relieved by medications. Or, any other signs or symptoms that you may have questions about.     DISPOSITION:    Home With:   OT  PT  HH  RN       Long term SNF/Inpatient Rehab   x Independent/assisted living    Hospice    Other:       PATIENT CONDITION AT DISCHARGE:     Functional status    Poor     Deconditioned    x Independent      Cognition    x Lucid     Forgetful     Dementia      Catheters/lines (plus indication)    Avila     PICC     PEG    x None      Code status   x  Full code     DNR      PHYSICAL EXAMINATION AT DISCHARGE:   Refer to Progress Note  Visit Vitals    /78 (BP 1 Location: Left arm, BP Patient Position: Sitting)    Pulse 65    Temp 98.3 °F (36.8 °C)    Resp 16    Ht 5' 9.02\" (1.753 m)    Wt 121 kg (266 lb 12.1 oz)    SpO2 98%    BMI 39.39 kg/m2          CHRONIC MEDICAL DIAGNOSES:  Problem List as of 7/25/2018  Date Reviewed: 5/6/2015          Codes Class Noted - Resolved    * (Principal)Intracranial bleed ([facility] [address] ICD-10-CM: I62.9  ICD-9-CM: 432.9  7/22/2018 - Present              Greater than 20  minutes were spent with the patient on counseling and coordination of care    Signed:   Courtney Louie MD  7/25/2018  11:10 AM

## 2018-07-25 NOTE — PROGRESS NOTES
Neurosurgery Progress Note  Jermain Meier  [phone]        Admit Date: 2018   LOS: 3 days        Daily Progress Note: 2018    HPI: The patient developed a sudden onset of headache starting on Friday. He had continued throbbing headaches over the weekend. He went to the ER at [facility] and was found to be hypertensive. He is not treated for hypertension at home. His head CT in the ER revealed a 2 cm left parietal temporal hematoma with vasogenic edema. He was transferred to to [facility] for further evaluation. MRI/MRA brain pending for today. He had a CTA chest yesterday for chest pain and dyspnea, which was negative for PE. Subjective:   No acute events overnight. Pt denies dizziness and headache this morning. Denies numbness, tingling, leg pain, nausea, vomiting, difficulty swallowing. Objective:     Vital signs  Temp (24hrs), Av.2 °F (36.8 °C), Min:98.1 °F (36.7 °C), Max:98.2 °F (36.8 °C)           Visit Vitals    BP (!) 144/94 (BP 1 Location: Left arm, BP Patient Position: Sitting)    Pulse 67    Temp 98.2 °F (36.8 °C)    Resp 16    Ht 5' 9.02\" (1.753 m)    Wt 121 kg (266 lb 12.1 oz)    SpO2 98%    BMI 39.39 kg/m2      O2 Device: Room air     Pain control  Pain Assessment  Pain Scale 1: Numeric (0 - 10)  Pain Intensity 1: 0  Pain Onset 1: 3 days PTA  Pain Location 1: Head  Pain Orientation 1: Anterior  Pain Description 1: Aching  Pain Intervention(s) 1: Medication (see MAR)    PT/OT  Gait     Gait  Speed/Cyn: Accelerated  Swing Pattern: Left asymmetrical  Stance: Left decreased  Gait Abnormalities: Trunk sway increased  Ambulation - Level of Assistance: Independent  Distance (ft): 300 Feet (ft)  Assistive Device: Gait belt  Rail Use: Right   Stairs - Level of Assistance: Modified independent  Number of Stairs Trained: 8           Physical Exam:  Gen:NAD. Neuro: A&Ox3. Follows commands. Speech clear. Affect normal.  PERRL. EOMI. Face symmetric. Palate symmetric.  Tongue midline. VYAS. Strength 5/5 in UE and LE BL. Negative drift. Gait deferred. CTA head without contrast on 07/25/18 shows left posterior temporal hematoma is unchanged from prior examination. There is no evidence for intracranial aneurysm or vascular malformation. 24 hour results:    No results found for this or any previous visit (from the past 24 hour(s)). Assessment:     Principal Problem:    Intracranial bleed ([address] (7/22/2018)        Plan:   1. Left parietal temporal ICH   - MRI/MRA brain negative for tumor or vascular lesion   - Neuro checks   - Mobilize   - Keppra for sz ppx - needs to be discharged on Keppra 500 mg PO bid x 2 weeks. - CTA head stable   - No surgical intervention warranted. Will have the patient follow-up with neurology as an outpatient for a hypercoaguable work-up for stroke since he is young. Likely hypertensive bleed but want to rule everything out. Outpatient appt for Dr. Ruth Hernandez placed on the chart and discussed necessity of the appt with the patient. 2. Cerebral edema   - due to #1   - steroids not indicated  3. HTN crisis   - SBP<150   - Cont metoprolol and norvasc. Discussed importance of compliance with BP meds with patient and need for f/u with PCP after discharge to prevent this from happening again with worse outcomes. Also advised him to get a home BP cuff to monitor his BP at home and keep a log for his PCP   - Labetalol/hydralazine PRN  4. Morbidly obese   - BMI 40.7   - Counseling as able    Activity: up with assist  DVT ppx: SCDs  Dispo: home    Plan d/w Dr. Magnolia Otoole. Ok to d/c to home from neurosurgery standpoint. Discussed the differences between hemorrhagic stroke and ischemic stroke and the importance of medication compliance and good follow-up with providers. He verbalized understanding.       Saud Meek NP

## 2018-07-25 NOTE — PROGRESS NOTES
I have reviewed discharge instructions with the patient. The patient verbalized understanding. Discharge medications reviewed with patient and appropriate educational materials and side effects teaching were provided. PIV and telemetry removed from patient. Signed copy of discharge instructions placed on chart. Patient wheeled down by volunteer to transportation home via family.

## 2018-08-07 ENCOUNTER — OFFICE VISIT (OUTPATIENT)
Dept: NEUROLOGY | Age: 38
End: 2018-08-07

## 2018-08-07 VITALS
SYSTOLIC BLOOD PRESSURE: 156 MMHG | BODY MASS INDEX: 39.87 KG/M2 | DIASTOLIC BLOOD PRESSURE: 78 MMHG | RESPIRATION RATE: 18 BRPM | HEART RATE: 42 BPM | OXYGEN SATURATION: 97 % | WEIGHT: 270 LBS

## 2018-08-07 DIAGNOSIS — I10 HYPERTENSION, UNSPECIFIED TYPE: ICD-10-CM

## 2018-08-07 DIAGNOSIS — I61.9 LEFT-SIDED NONTRAUMATIC INTRACEREBRAL HEMORRHAGE, UNSPECIFIED CEREBRAL LOCATION (HCC): Primary | ICD-10-CM

## 2018-08-07 NOTE — MR AVS SNAPSHOT
[address] 
[phone] Patient: Ellen Tirado MRN: [id number] ANZ:[id number] Visit Information Date & Time Provider Department Dept. Phone Encounter #  
 8/7/2018  2:00 PM Angela Lockhart [facility] at [place] [id number] [phone] Follow-up Instructions Return in about 3 months (around 11/7/2018). Upcoming Health Maintenance Date Due DTaP/Tdap/Td series (1 - Tdap) 1/18/2001 Influenza Age 5 to Adult 8/1/2018 Allergies as of 8/7/2018  Review Complete On: 8/7/2018 By: Brittney Sen, DO No Known Allergies Current Immunizations  Never Reviewed No immunizations on file. Not reviewed this visit You Were Diagnosed With   
  
 Codes Comments Left-sided nontraumatic intracerebral hemorrhage, unspecified cerebral location [facility])    -  Primary ICD-10-CM: I61.9 ICD-9-CM: 736 Hypertension, unspecified type     ICD-10-CM: I10 
ICD-9-CM: 401.9 Vitals BP Pulse Resp Weight(growth percentile) SpO2 BMI  
 156/78 (!) 42 18 270 lb (122.5 kg) 97% 39.87 kg/m2 Smoking Status Never Smoker Vitals History BMI and BSA Data Body Mass Index Body Surface Area  
 39.87 kg/m 2 2.44 m 2 Your Updated Medication List  
  
   
This list is accurate as of 8/7/18  2:15 PM.  Always use your most recent med list. amLODIPine 5 mg tablet Commonly known as:  Alexis Alstrom Take 1 Tab by mouth daily. butalbital-acetaminophen-caffeine -40 mg per tablet Commonly known as:  Enriqueta Riddles Take 1 Tab by mouth every four (4) hours as needed for Headache.  
  
 hydroCHLOROthiazide 12.5 mg tablet Commonly known as:  HYDRODIURIL Take 1 Tab by mouth daily. levETIRAcetam 500 mg tablet Commonly known as:  KEPPRA Take 1 Tab by mouth two (2) times a day. metoprolol tartrate 50 mg tablet Commonly known as:  LOPRESSOR Take 1 Tab by mouth two (2) times a day. Follow-up Instructions Return in about 3 months (around 11/7/2018). Patient Instructions A Healthy Lifestyle: Care Instructions Your Care Instructions A healthy lifestyle can help you feel good, stay at a healthy weight, and have plenty of energy for both work and play. A healthy lifestyle is something you can share with your whole family. A healthy lifestyle also can lower your risk for serious health problems, such as high blood pressure, heart disease, and diabetes. You can follow a few steps listed below to improve your health and the health of your family. Follow-up care is a key part of your treatment and safety. Be sure to make and go to all appointments, and call your doctor if you are having problems. It's also a good idea to know your test results and keep a list of the medicines you take. How can you care for yourself at home? · Do not eat too much sugar, fat, or fast foods. You can still have dessert and treats now and then. The goal is moderation. · Start small to improve your eating habits. Pay attention to portion sizes, drink less juice and soda pop, and eat more fruits and vegetables. ¨ Eat a healthy amount of food. A 3-ounce serving of meat, for example, is about the size of a deck of cards. Fill the rest of your plate with vegetables and whole grains. ¨ Limit the amount of soda and sports drinks you have every day. Drink more water when you are thirsty. ¨ Eat at least 5 servings of fruits and vegetables every day. It may seem like a lot, but it is not hard to reach this goal. A serving or helping is 1 piece of fruit, 1 cup of vegetables, or 2 cups of leafy, raw vegetables. Have an apple or some carrot sticks as an afternoon snack instead of a candy bar.  Try to have fruits and/or vegetables at every meal. 
 · Make exercise part of your daily routine. You may want to start with simple activities, such as walking, bicycling, or slow swimming. Try to be active 30 to 60 minutes every day. You do not need to do all 30 to 60 minutes all at once. For example, you can exercise 3 times a day for 10 or 20 minutes. Moderate exercise is safe for most people, but it is always a good idea to talk to your doctor before starting an exercise program. 
· Keep moving. Edgardalia Popethers the lawn, work in the garden, or [employer]ox. Take the stairs instead of the elevator at work. · If you smoke, quit. People who smoke have an increased risk for heart attack, stroke, cancer, and other lung illnesses. Quitting is hard, but there are ways to boost your chance of quitting tobacco for good. ¨ Use nicotine gum, patches, or lozenges. ¨ Ask your doctor about stop-smoking programs and medicines. ¨ Keep trying. In addition to reducing your risk of diseases in the future, you will notice some benefits soon after you stop using tobacco. If you have shortness of breath or asthma symptoms, they will likely get better within a few weeks after you quit. · Limit how much alcohol you drink. Moderate amounts of alcohol (up to 2 drinks a day for men, 1 drink a day for women) are okay. But drinking too much can lead to liver problems, high blood pressure, and other health problems. Family health If you have a family, there are many things you can do together to improve your health. · Eat meals together as a family as often as possible. · Eat healthy foods. This includes fruits, vegetables, lean meats and dairy, and whole grains. · Include your family in your fitness plan. Most people think of activities such as jogging or tennis as the way to fitness, but there are many ways you and your family can be more active. Anything that makes you breathe hard and gets your heart pumping is exercise. Here are some tips: ¨ Walk to do errands or to take your child to school or the bus. ¨ Go for a family bike ride after dinner instead of watching TV. Where can you learn more? Go to http://shanique-milo.info/. Enter C620 in the search box to learn more about \"A Healthy Lifestyle: Care Instructions. \" Current as of: December 7, 2017 Content Version: 11.7 © 4800-4130 [employer]. Care instructions adapted under license by [employer] (which disclaims liability or warranty for this information). If you have questions about a medical condition or this instruction, always ask your healthcare professional. [address] any warranty or liability for your use of this information. Introducing [facility]! Shlomo Oviedo introduces [employer] patient portal. Now you can access parts of your medical record, email your doctor's office, and request medication refills online. 1. In your internet browser, go to [email or web address] 2. Click on the First Time User? Click Here link in the Sign In box. You will see the New Member Sign Up page. 3. Enter your [employer] Access Code exactly as it appears below. You will not need to use this code after youve completed the sign-up process. If you do not sign up before the expiration date, you must request a new code. · [employer] Access Code: [email or web address] Expires: 10/20/2018  1:47 PM 
 
4. Enter the last four digits of your Social Security Number (xxxx) and Date of Birth (mm/dd/yyyy) as indicated and click Submit. You will be taken to the next sign-up page. 5. Create a K[employer]ICORE ID. This will be your [employer] login ID and cannot be changed, so think of one that is secure and easy to remember. 6. Create a [employer]ORE password. You can change your password at any time. 7. Enter your Password Reset Question and Answer. This can be used at a later time if you forget your password. 8. Enter your e-mail address. You will receive e-mail notification when new information is available in [address] 9. Click Sign Up. You can now view and download portions of your medical record. 10. Click the Download Summary menu link to download a portable copy of your medical information. If you have questions, please visit the Frequently Asked Questions section of the Between website. Remember, Between is NOT to be used for urgent needs. For medical emergencies, dial 911. Now available from your iPhone and Android! Please provide this summary of care documentation to your next provider. Your primary care clinician is listed as Christopher Berrios. If you have any questions after today's visit, please call [phone].

## 2018-08-07 NOTE — PROGRESS NOTES
[facility]   NEW PATIENT EVALUATION/CONSULTATION       PATIENT NAME: Lourdes Carrera    MRN: [id number]    REASON FOR CONSULTATION: [address]    08/07/18      Previous records (physician notes, laboratory reports, and radiology reports) and imaging studies were reviewed and summarized. My recommendations will be communicated back to the patient's physician(s) via electronic medical record and/or by [address] mail. HISTORY OF PRESENT ILLNESS:  Lourdes Carrera is a 45 y.o. right handed male presenting for evaluation of ICH. Pt reports severe new onset headache 2 days prior to presentation located over the frontal region b/l, L retro-orbital.  No associated N/V. No seizure activity reported. He denied exposure to antiplatelet or [address]. No preceding trauma. At presentation to [facility] ED his BP was 165/118mg. No focal weakness, numbness/paresthesias, aphasia (baseline stuttering reported), dysarthria. Head CT 7/22/18 reviewed L temporal ICH with mild associated edema. Evaluated by NSGY with non-surgical management. Subsequent MRI/A without evidence of underlying mass, ischemia or vascular malformation/aneurysm. He does report intermittent headaches since discharge located bi-frontally and occipitally lasting 1 hour at most, no N/V or photophobia. HAs occur twice weekly on average. He remains on LEV since his hospitalization. No seizure activity.       PAST MEDICAL HISTORY:  Past Medical History:   Diagnosis Date    Arthritis    HTN    PAST SURGICAL HISTORY:  None    FAMILY HISTORY:   Family History   Problem Relation Age of Onset    Hypertension Mother     Heart Disease Father          SOCIAL HISTORY:  Social History     Social History    Marital status: SINGLE     Spouse name: N/A    Number of children: N/A    Years of education: N/A     Social History Main Topics    Smoking status: Never Smoker    Smokeless tobacco: Never Used    Alcohol use No    Drug use: No    Sexual activity: Yes     Partners: Female     Other Topics Concern    Not on file     Social History Narrative         MEDICATIONS:   Current Outpatient Prescriptions   Medication Sig Dispense Refill    amLODIPine (NORVASC) 5 mg tablet Take 1 Tab by mouth daily. 30 Tab 3    butalbital-acetaminophen-caffeine (FIORICET, ESGIC) -40 mg per tablet Take 1 Tab by mouth every four (4) hours as needed for Headache. 20 Tab 0    levETIRAcetam (KEPPRA) 500 mg tablet Take 1 Tab by mouth two (2) times a day. 60 Tab 0    metoprolol tartrate (LOPRESSOR) 50 mg tablet Take 1 Tab by mouth two (2) times a day. 60 Tab 2    hydroCHLOROthiazide (HYDRODIURIL) 12.5 mg tablet Take 1 Tab by mouth daily. 30 Tab 0         ALLERGIES:  No Known Allergies      REVIEW OF SYSTEMS:  10 point ROS reviewed with patient. Please see scanned document under media. PHYSICAL EXAM:  Vital Signs:   Visit Vitals    /78    Pulse (!) 42    Resp 18    Wt 122.5 kg (270 lb)    SpO2 97%    BMI 39.87 kg/m2        General Medical Exam:  General:  Well appearing, comfortable, in no apparent distress. Eyes/ENT: see cranial nerve examination. Neck: No masses appreciated. Full range of motion without tenderness. Respiratory:  Clear to auscultation, good air entry bilaterally. Cardiac:  Regular rate and rhythm, no murmur. GI:  Soft, non-tender, non-distended abdomen. Bowel sounds normal. No masses, organomegaly. Extremities:  No deformities, edema, or skin discoloration. Skin:  No rashes or lesions. Neurological:  · Mental Status:  Alert and oriented to person, place, and time with fluent speech. Occasional stuttering. · Cranial Nerves:   CNII/III/IV/VI: visual fields full to confrontation, EOMI, PERRL, no ptosis, +R lateral gaze nystagmus.     CN V: Facial sensation intact bilaterally, masseter 5/5   CN VII: Facial muscles symmetric and strong   CN VIII: Hears finger rub well bilaterally, intact vestibular function CN IX/X: Normal palatal movement   CN XI: Full strength shoulder shrug bilaterally   CN XII: Tongue protrusion full and midline without fasciculation or atrophy  · Motor: Normal tone and muscle bulk with no pronator drift. Individual muscle group testing:  Shoulder abduction:   Left:5/5   Right : 5/5    Shoulder adduction:   Left:5/5   Right : 5/5    Elbow flexion:      Left:5/5   Right : 5/5  Elbow extension:    Left:5/5   Right : 5/5   Wrist flexion:    Left:5/5   Right : 5/5  Wrist extension:    Left:5/5   Right : 5/5  Arm pronation:   Left:5/5   Right : 5/5  Arm supination:   Left:5/5   Right : 5/5    Finger flexion:    Left:5/5   Right : 5/5    Finger extension:   Left:5/5   Right : 5/5   Finger abduction:  Left:5/5   Right : 5/5   Finger adduction:   Left:5/5   Right : 5/5  Hip flexion:     Left:5/5   Right : 5/5         Hip extension:   Left:5/5   Right : 5/5    Knee flexion:     Left:5/5   Right : 5/5    Knee extension:   Left:5/5   Right : 5/5    Dorsiflexion:     Left:5/5   Right : 5/5  Plantar flexion:    Left:5/5   Right : 5/5      · MSRs: No crossed adductors or clonus. RIGHT  LEFT   Brachioradialis 3+ 3+   Biceps 2+ 2+   Triceps 3+ 3+   Knee 3+ 3+   Achilles 2+ 2+        Plantar response Downward Downward          · Sensation: Normal and symmetric perception of pinprick, temperature, light touch, proprioception, and vibration; (-) Romberg. · Coordination: No dysmetria. Normal rapid alternating movements; finger-to-nose and heel-to- shin testing are within normal limits. · Gait: Normal native gait    PERTINENT DATA:  INTERNAL RECORDS:  The patient's electronic medical record was reviewed. The relevant details include:     CT Results (maximum last 3): Results from Hospital Encounter encounter on 07/22/18   CTA HEAD   Narrative Indication: Evaluate intracranial hemorrhage. Contrast-enhanced CT angiogram of the head performed using 100 cc Isovue 300.   Three-dimensional postprocessing performed. CT dose reduction was achieved through use of a standardized protocol tailored  for this examination and are automatic exposure control for dose modulation dose  reduction. FINDINGS:    Precontrast images redemonstrate the left posterior temporal hematoma which is  unchanged from July 22. There is mild edema adjacent to the hematoma but no  significant mass effect. CT angiogram does not demonstrate abnormal vessels in the region of the  hematoma. The major intracranial vessels are patent. There is no evidence for  intracranial aneurysm or vascular malformation. Impression IMPRESSION: Left posterior temporal hematoma is unchanged from prior  examination. There is no evidence for intracranial aneurysm or vascular  malformation. Results from Hospital Encounter encounter on 07/22/18   CTA CHEST W OR W WO CONT   Narrative EXAM:  CTA CHEST W OR W WO CONT    INDICATION:   Chest pain, acute, pulmonary embolism (PE) suspected    COMPARISON: None. CONTRAST:  100 mL of Isovue-370. TECHNIQUE:   Precontrast  images were obtained to localize the volume for acquisition. Multislice helical CT arteriography was performed from the diaphragm to the  thoracic inlet during uneventful rapid bolus intravenous contrast  administration. Lung and soft tissue windows were generated. Coronal and  sagittal images were generated and 3D post processing consisting of coronal  maximum intensity images was performed. CT dose reduction was achieved through  use of a standardized protocol tailored for this examination and automatic  exposure control for dose modulation. FINDINGS:  The lungs are clear of mass, nodule, airspace disease or edema. The pulmonary arteries are well enhanced and no pulmonary emboli are identified  with some suboptimal opacification of the upper lobe pulmonary branches  peripherally. There is no mediastinal or hilar adenopathy or mass.  The heart is normal in size  without pericardial effusion. The aorta enhances normally without evidence of  aneurysm or dissection. The visualized portions of the upper abdominal organs are normal.         Impression IMPRESSION: No pulmonary embolus or other acute cardiopulmonary process. (There  is some suboptimal opacification of upper lobe peripheral branches). CT HEAD WO CONT   Narrative EXAM:  CT HEAD WO CONT    INDICATION: LEFT sided headache described as sinus pressure to 4 head and behind  left x3 days. COMPARISON: None. CONTRAST:  None. TECHNIQUE: Unenhanced CT of the head was performed using 5 mm images. Brain and  bone windows were generated. CT dose reduction was achieved through use of a  standardized protocol tailored for this examination and automatic exposure  control for dose modulation. FINDINGS: There is a left parietal parenchymal hematoma measuring 2.2 x 2.6 x  2.3 cm (6.9 mL estimated volume). There is surrounding hypodensity compatible  with edema. There is some mild localized mass effect upon the adjacent posterior  horn of the left lateral ventricle and sulci. No other intracranial hemorrhage  is identified. The ventricles and sulci are otherwise normal in size, shape and configuration  and midline. There is no significant white matter disease. There is otherwise no  extra-axial collection, mass, mass effect or midline shift. The basilar  cisterns are open. No acute infarct is identified. The bone windows demonstrate  no abnormalities. The visualized portions of the paranasal sinuses and mastoid  air cells are clear. Impression IMPRESSION: Left parietal hematoma with surrounding edema measuring 2.2 x 2.6 x  2.3 cm. The findings were called to Dr. Tanesha Santana on 7/22/2018 at 16:06 by myself. [id number]            MRI Results (maximum last 3):     Results from [place] encounter on 07/22/18   MRA BRAIN WO CONT   Narrative EXAM:  MRI BRAIN W WO CONT, MRA BRAIN WO CONT    INDICATION: Headache. Left cerebral intracranial intra-axial hematoma on CT. Hypertension. COMPARISON: CT head on 7/22/2018. TECHNIQUE: Multisequence, multiplanar MRI of the brain before and following  uneventful intravenous administration of gadolinium 20 mL Dotarem. Noncontrast  time of flight MR angiography of the head. Multiplanar maximum intensity  projection reformats. (2 separate studies reported together)    FINDINGS: MRI brain: Hyperintense T1, hypointense T2 intra-axial hematoma in the  posterior left temporal lobe measures 4.4 x 2.7 x 1.8 cm, unchanged by my  measurements. Estimated volume is 10.7 mL. There is surrounding vasogenic edema, similar to the CT. No associated  restricted diffusion. No underlying mass or vascular malformation. There is no hydrocephalus. There is no midline shift. No extra-axial fluid  collection. No pathologic enhancement. No restricted diffusion to indicate acute  infarct. The midline structures, including the cervicomedullary junction, are  within normal limits. MRA head: The vertebral arteries are codominant. The basilar artery and its  branches are normal. The internal carotid, anterior cerebral, and middle  cerebral arteries are patent. There is no flow-limiting intracranial stenosis. There is no aneurysm. There are no sizable posterior communicating arteries. Impression IMPRESSION:  1. Unchanged 10.7 mL intra-axial subacute hematoma in the posterior left  temporal lobe. Unchanged surrounding vasogenic edema. No evidence of underlying  infarct, mass, or vascular malformation. 2. Normal MR angiography of the head. MRI BRAIN W WO CONT   Narrative EXAM:  MRI BRAIN W WO CONT, MRA BRAIN WO CONT    INDICATION: Headache. Left cerebral intracranial intra-axial hematoma on CT. Hypertension. COMPARISON: CT head on 7/22/2018.     TECHNIQUE: Multisequence, multiplanar MRI of the brain before and following  uneventful intravenous administration of gadolinium 20 mL Dotarem. Noncontrast  time of flight MR angiography of the head. Multiplanar maximum intensity  projection reformats. (2 separate studies reported together)    FINDINGS: MRI brain: Hyperintense T1, hypointense T2 intra-axial hematoma in the  posterior left temporal lobe measures 4.4 x 2.7 x 1.8 cm, unchanged by my  measurements. Estimated volume is 10.7 mL. There is surrounding vasogenic edema, similar to the CT. No associated  restricted diffusion. No underlying mass or vascular malformation. There is no hydrocephalus. There is no midline shift. No extra-axial fluid  collection. No pathologic enhancement. No restricted diffusion to indicate acute  infarct. The midline structures, including the cervicomedullary junction, are  within normal limits. MRA head: The vertebral arteries are codominant. The basilar artery and its  branches are normal. The internal carotid, anterior cerebral, and middle  cerebral arteries are patent. There is no flow-limiting intracranial stenosis. There is no aneurysm. There are no sizable posterior communicating arteries. Impression IMPRESSION:  1. Unchanged 10.7 mL intra-axial subacute hematoma in the posterior left  temporal lobe. Unchanged surrounding vasogenic edema. No evidence of underlying  infarct, mass, or vascular malformation. 2. Normal MR angiography of the head. ASSESSMENT:      ICD-10-CM ICD-9-CM    1. Left-sided nontraumatic intracerebral hemorrhage, unspecified cerebral location ([facility] [address] I61.9 [id number]    2. Hypertension, unspecified type I10 36.9    45year old AAM presenting with acute onset severe headache, found with newly diagnosed HTN and evidence of L temporal ICH with mild associated edema on head CT 7/22/18. Evaluated by NSGY with non-surgical management. Subsequent MRI/A without evidence of underlying mass, ischemia or vascular malformation/aneurysm. Etiology for his ICH is most likely hypertensive. He reports some residual headaches since discharge which are overall improved, denies seizure activity. PLAN:  · PCP F/U HTN control , goal <140  · D/C LEV. Monitor for clinical seizure activity. · May use tylenol sparingly for rescue HA tx    Follow-up Disposition:  Return in about 3 months (around 11/7/2018). I have discussed the diagnosis with the patient and the intended plan as seen in the above orders. Patient is in agreement. The patient has received an after-visit summary and questions were answered concerning future plans. Deepa Correa DO  Staff Neurologist  Diplomate, [phone]       CC Referring provider:  Veronica Ridley MD

## 2018-08-07 NOTE — PATIENT INSTRUCTIONS

## 2018-09-04 ENCOUNTER — OFFICE VISIT (OUTPATIENT)
Dept: INTERNAL MEDICINE CLINIC | Age: 38
End: 2018-09-04

## 2018-09-04 VITALS
DIASTOLIC BLOOD PRESSURE: 80 MMHG | HEART RATE: 72 BPM | HEIGHT: 69 IN | SYSTOLIC BLOOD PRESSURE: 126 MMHG | WEIGHT: 271.1 LBS | TEMPERATURE: 97.8 F | OXYGEN SATURATION: 97 % | RESPIRATION RATE: 18 BRPM | BODY MASS INDEX: 40.15 KG/M2

## 2018-09-04 DIAGNOSIS — G47.33 OSA (OBSTRUCTIVE SLEEP APNEA): ICD-10-CM

## 2018-09-04 DIAGNOSIS — I10 ESSENTIAL HYPERTENSION: ICD-10-CM

## 2018-09-04 DIAGNOSIS — E66.01 OBESITY, MORBID (HCC): ICD-10-CM

## 2018-09-04 DIAGNOSIS — I62.9 INTRACRANIAL BLEED (HCC): Primary | ICD-10-CM

## 2018-09-04 PROBLEM — I61.9 ICH (INTRACEREBRAL HEMORRHAGE) (HCC): Status: ACTIVE | Noted: 2018-07-22

## 2018-09-04 RX ORDER — METOPROLOL TARTRATE 50 MG/1
50 TABLET ORAL 2 TIMES DAILY
Qty: 180 TAB | Refills: 2 | Status: SHIPPED | OUTPATIENT
Start: 2018-09-04 | End: 2019-05-29 | Stop reason: SDUPTHER

## 2018-09-04 RX ORDER — HYDROCHLOROTHIAZIDE 12.5 MG/1
12.5 TABLET ORAL DAILY
Qty: 90 TAB | Refills: 3 | Status: SHIPPED | OUTPATIENT
Start: 2018-09-04 | End: 2019-05-29 | Stop reason: SDUPTHER

## 2018-09-04 RX ORDER — AMLODIPINE BESYLATE 5 MG/1
5 TABLET ORAL DAILY
Qty: 90 TAB | Refills: 3 | Status: SHIPPED | OUTPATIENT
Start: 2018-09-04 | End: 2019-05-29 | Stop reason: SDUPTHER

## 2018-09-04 NOTE — PROGRESS NOTES
1. Have you been to the ER, urgent care clinic since your last visit? Hospitalized since your last visit? Yes When: 7-22-18 Where: [facility] Reason for visit: blood on brain 2. Have you seen or consulted any other health care providers outside of the [address] [place] [address] since your last visit? Include any pap smears or colon screening.  No

## 2018-09-04 NOTE — PROGRESS NOTES
SPORTS MEDICINE AND PRIMARY CARE Torrie Hill MD, [address] [address] Phone:  [phone]  Fax: [phone] Chief Complaint Patient presents with [address] [place] [address] SUBJECTIVE: 
 
Les Doss is a 45 y.o. male Patient comes in today and is seen as a new patient for evaluation and ongoing care. He was admitted to [facility] in July following severe headache, new onset, two days prior to presentation over the frontal region bilaterally with retroorbital symptoms. No seizure activity. 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 He was evaluated by NSGY with nonsurgical management. Subsequent MRI, MRA revealed no underlying mass, ischemia or vascular malformation or aneurysm. He is being followed by [facility] neurologically and was seen on 08/07/18 for follow up. Patient comes in today for physical examination and ongoing care. Patient continues to have frontal headaches, last occurred on Friday, relieved with Fioricet. Dr. Meenakshi Corea took him off the [facility] he tells me. Other new complaints denied. Patient is seen for evaluation. Current Outpatient Prescriptions Medication Sig Dispense Refill  amLODIPine (NORVASC) 5 mg tablet Take 1 Tab by mouth daily. 90 Tab 3  
 hydroCHLOROthiazide (HYDRODIURIL) 12.5 mg tablet Take 1 Tab by mouth daily. 90 Tab 3  
 metoprolol tartrate (LOPRESSOR) 50 mg tablet Take 1 Tab by mouth two (2) times a day. 180 Tab 2 Past Medical History:  
Diagnosis Date  Arthritis  Headache  Hypertension  [facility] 07/22/2018 History reviewed. No pertinent surgical history. No Known Allergies REVIEW OF SYSTEMS: 
General: negative for - chills or fever ENT: negative for - headaches, nasal congestion or tinnitus Respiratory: negative for - cough, hemoptysis, shortness of breath or wheezing Cardiovascular : negative for - chest pain, edema, palpitations or shortness of breath Gastrointestinal: negative for - abdominal pain, blood in stools, heartburn or nausea/vomiting Genito-Urinary: no dysuria, trouble voiding, or hematuria Musculoskeletal: negative for - gait disturbance, joint pain, joint stiffness or joint swelling Neurological: no TIA or stroke symptoms Hematologic: no bruises, no bleeding, no swollen glands Integument: no lumps, mole changes, nail changes or rash Endocrine:no malaise/lethargy or unexpected weight changes Social History Social History  Marital status: SINGLE Spouse name: N/A  
 Number of children: N/A  
 Years of education: N/A Social History Main Topics  Smoking status: Never Smoker  Smokeless tobacco: Never Used  Alcohol use No  
 Drug use: No  
 Sexual activity: Yes  
  Partners: Female Other Topics Concern  None Social History Narrative Family History Problem Relation Age of Onset  Hypertension Mother  Heart Disease Father Patient is a lifetime nonsmoker, non drinker, non drug abuser. Social History:  The patient is single, lives with his girlfriend who is also the father of his three children, 16, 15 and 2. He completed the 12th grade. 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 Family History:  Father is 54, alive and well. Mother 62 with diabetes. Three sisters are alive and well. OBJECTIVE:  
 
Visit Vitals  /72  Pulse 72  Temp 97.8 °F (36.6 °C) (Oral)  Resp 18  Ht 5' 9\" (1.753 m)  Wt 271 lb 1.6 oz (123 kg)  SpO2 97%  BMI 40.03 kg/m2 CONSTITUTIONAL: well , well nourished, appears age appropriate EYES: perrla, eom intact ENMT:moist mucous membranes, pharynx clear NECK: supple. Thyroid normal 
RESPIRATORY: Chest: clear bilaterally CARDIOVASCULAR: Heart: regular rate and rhythm GASTROINTESTINAL: Abdomen: soft, bowel sounds active HEMATOLOGIC: no pathological lymph nodes palpated MUSCULOSKELETAL: Extremities: no edema, pulse 1+ INTEGUMENT: No unusual rashes or suspicious skin lesions noted. Nails appear normal. 
NEUROLOGIC: non-focal exam  
MENTAL STATUS: alert and oriented, appropriate affect Admission on 07/22/2018, Discharged on 07/25/2018 Component Date Value Ref Range Status  Ventricular Rate 07/22/2018 67  BPM Final  
 Atrial Rate 07/22/2018 67  BPM Final  
 P-R Interval 07/22/2018 152  ms Final  
 QRS Duration 07/22/2018 102  ms Final  
 Q-T Interval 07/22/2018 404  ms Final  
 QTC Calculation (Bezet) 07/22/2018 426  ms Final  
 Calculated R Axis 07/22/2018 173  degrees Final  
 Calculated T Axis 07/22/2018 153  degrees Final  
 Diagnosis 07/22/2018    Final  
                 Value:Limb lead reversal 
Normal sinus rhythm with occasional premature ventricular complexes Confirmed by Michele Corrales M.D., [facility] ([id number]) on 7/23/2018 11:08:05 AM 
  
 Sodium 07/23/2018 139  136 - 145 mmol/L Final  
 Potassium 07/23/2018 4.3  3.5 - 5.1 mmol/L Final  
 SPECIMEN HEMOLYZED, RESULTS MAY BE AFFECTED  Chloride 07/23/2018 106  97 - 108 mmol/L Final  
 CO2 07/23/2018 25  21 - 32 mmol/L Final  
 Anion gap 07/23/2018 8  5 - 15 mmol/L Final  
 Glucose 07/23/2018 95  65 - 100 mg/dL Final  
 BUN 07/23/2018 12  6 - 20 MG/DL Final  
 Creatinine 07/23/2018 1.20  0.70 - 1.30 MG/DL Final  
 BUN/Creatinine ratio 07/23/2018 10* 12 - 20   Final  
 GFR est AA 07/23/2018 >60  >60 ml/min/1.73m2 Final  
 GFR est non-AA 07/23/2018 >60  >60 ml/min/1.73m2 Final  
 Comment: Estimated GFR is calculated using the IDMS-traceable Modification of Diet in Renal Disease (MDRD) Study equation, reported for both  Americans (GFRAA) and non- Americans (GFRNA), and normalized to 1.73m2 body surface area. The physician must decide which value applies to the patient.  
The MDRD study equation should only be used in individuals age 25 or older. It has not been validated for the following: pregnant women, patients with serious comorbid conditions, or on certain medications, or persons with extremes of body size, muscle mass, or nutritional status.  Calcium 07/23/2018 8.1* 8.5 - 10.1 MG/DL Final  
 Bilirubin, total 07/23/2018 0.7  0.2 - 1.0 MG/DL Final  
 ALT (SGPT) 07/23/2018 16  12 - 78 U/L Final  
 AST (SGOT) 07/23/2018 15  15 - 37 U/L Final  
 SPECIMEN HEMOLYZED, RESULTS MAY BE AFFECTED  Alk. phosphatase 07/23/2018 106  45 - 117 U/L Final  
 Protein, total 07/23/2018 6.8  6.4 - 8.2 g/dL Final  
 Albumin 07/23/2018 3.0* 3.5 - 5.0 g/dL Final  
 Globulin 07/23/2018 3.8  2.0 - 4.0 g/dL Final  
 A-G Ratio 07/23/2018 0.8* 1.1 - 2.2   Final  
Admission on 07/22/2018, Discharged on 07/22/2018 Component Date Value Ref Range Status  Ventricular Rate 07/22/2018 81  BPM Final  
 Atrial Rate 07/22/2018 81  BPM Final  
 P-R Interval 07/22/2018 144  ms Final  
 QRS Duration 07/22/2018 116  ms Final  
 Q-T Interval 07/22/2018 370  ms Final  
 QTC Calculation (Bezet) 07/22/2018 429  ms Final  
 Calculated P Axis 07/22/2018 64  degrees Final  
 Calculated R Axis 07/22/2018 58  degrees Final  
 Calculated T Axis 07/22/2018 52  degrees Final  
 Diagnosis 07/22/2018    Final  
                 Value:Sinus rhythm with occasional premature ventricular complexes Incomplete right bundle branch block No previous ECGs available Confirmed by Eldon Ruelas ([id number]) on 7/23/2018 12:21:23 PM 
  
 WBC 07/22/2018 7.2  4.1 - 11.1 K/uL Final  
 Comment: Due to mathematical rounding between the [address], and the new [employer] analyzers, the reported automated differential may vary by up to +/- 0.5% per cell line. This finding may produce a result that is 100% +/- 3%, which is clinically insignificant.  
  
 RBC 07/22/2018 4.60  4.10 - 5.70 M/uL Final  
 HGB 07/22/2018 13.7  12.1 - 17.0 g/dL Final  
  HCT 07/22/2018 41.8  36.6 - 50.3 % Final  
 MCV 07/22/2018 90.9  80.0 - 99.0 FL Final  
 MCH 07/22/2018 29.8  26.0 - 34.0 PG Final  
 MCHC 07/22/2018 32.8  30.0 - 36.5 g/dL Final  
 RDW 07/22/2018 13.5  11.5 - 14.5 % Final  
 PLATELET 36/33/8397 667  150 - 400 K/uL Final  
 MPV 07/22/2018 9.8  8.9 - 12.9 FL Final  
 NRBC 07/22/2018 0.0  0  WBC Final  
 ABSOLUTE NRBC 07/22/2018 0.00  0.00 - 0.01 K/uL Final  
 NEUTROPHILS 07/22/2018 58  32 - 75 % Final  
 LYMPHOCYTES 07/22/2018 34  12 - 49 % Final  
 MONOCYTES 07/22/2018 6  5 - 13 % Final  
 EOSINOPHILS 07/22/2018 1  0 - 7 % Final  
 BASOPHILS 07/22/2018 1  0 - 1 % Final  
 IMMATURE GRANULOCYTES 07/22/2018 0  0.0 - 0.5 % Final  
 ABS. NEUTROPHILS 07/22/2018 4.2  1.8 - 8.0 K/UL Final  
 ABS. LYMPHOCYTES 07/22/2018 2.5  0.8 - 3.5 K/UL Final  
 ABS. MONOCYTES 07/22/2018 0.5  0.0 - 1.0 K/UL Final  
 ABS. EOSINOPHILS 07/22/2018 0.1  0.0 - 0.4 K/UL Final  
 ABS. BASOPHILS 07/22/2018 0.0  0.0 - 0.1 K/UL Final  
 ABS. IMM. GRANS. 07/22/2018 0.0  0.00 - 0.04 K/UL Final  
 DF 07/22/2018 AUTOMATED    Final  
 Sodium 07/22/2018 139  136 - 145 mmol/L Final  
 Potassium 07/22/2018 3.8  3.5 - 5.1 mmol/L Final  
 Chloride 07/22/2018 106  97 - 108 mmol/L Final  
 CO2 07/22/2018 26  21 - 32 mmol/L Final  
 Anion gap 07/22/2018 7  5 - 15 mmol/L Final  
 Glucose 07/22/2018 92  65 - 100 mg/dL Final  
 BUN 07/22/2018 13  6 - 20 MG/DL Final  
 Creatinine 07/22/2018 1.22  0.70 - 1.30 MG/DL Final  
 BUN/Creatinine ratio 07/22/2018 11* 12 - 20   Final  
 GFR est AA 07/22/2018 >60  >60 ml/min/1.73m2 Final  
 GFR est non-AA 07/22/2018 >60  >60 ml/min/1.73m2 Final  
 Comment: Estimated GFR is calculated using the IDMS-traceable Modification of Diet in Renal Disease (MDRD) Study equation, reported for both  Americans (GFRAA) and non- Americans (GFRNA), and normalized to 1.73m2 body surface area.  The physician must decide which value applies to the patient. The MDRD study equation should only be used in individuals age 25 or older. It has not been validated for the following: pregnant women, patients with serious comorbid conditions, or on certain medications, or persons with extremes of body size, muscle mass, or nutritional status.  Calcium 07/22/2018 8.4* 8.5 - 10.1 MG/DL Final  
 Bilirubin, total 07/22/2018 0.5  0.2 - 1.0 MG/DL Final  
 ALT (SGPT) 07/22/2018 18  12 - 78 U/L Final  
 AST (SGOT) 07/22/2018 14* 15 - 37 U/L Final  
 Alk. phosphatase 07/22/2018 111  45 - 117 U/L Final  
 Protein, total 07/22/2018 7.7  6.4 - 8.2 g/dL Final  
 Albumin 07/22/2018 3.7  3.5 - 5.0 g/dL Final  
 Globulin 07/22/2018 4.0  2.0 - 4.0 g/dL Final  
 A-G Ratio 07/22/2018 0.9* 1.1 - 2.2   Final  
 Color 07/22/2018 YELLOW/STRAW    Final  
 Color Reference Range: Straw, Yellow or Dark Yellow  Appearance 07/22/2018 CLEAR  CLEAR   Final  
 Specific gravity 07/22/2018 1.022  1.003 - 1.030   Final  
 pH (UA) 07/22/2018 7.5  5.0 - 8.0   Final  
 Protein 07/22/2018 NEGATIVE   NEG mg/dL Final  
 Glucose 07/22/2018 NEGATIVE   NEG mg/dL Final  
 Ketone 07/22/2018 NEGATIVE   NEG mg/dL Final  
 Bilirubin 07/22/2018 NEGATIVE   NEG   Final  
 Blood 07/22/2018 NEGATIVE   NEG   Final  
 Urobilinogen 07/22/2018 1.0  0.2 - 1.0 EU/dL Final  
 Nitrites 07/22/2018 NEGATIVE   NEG   Final  
 Leukocyte Esterase 07/22/2018 NEGATIVE   NEG   Final  
 WBC 07/22/2018 0-4  0 - 4 /hpf Final  
 RBC 07/22/2018 0-5  0 - 5 /hpf Final  
 Epithelial cells 07/22/2018 FEW  FEW /lpf Final  
 Epithelial cell category consists of squamous cells and /or transitional urothelial cells. Renal tubular cells, if present, are separately identified as such.   
 Bacteria 07/22/2018 NEGATIVE   NEG /hpf Final  
 UA:UC IF INDICATED 07/22/2018 CULTURE NOT INDICATED BY UA RESULT  CNI   Final  
 Hyaline cast 07/22/2018 0-2  0 - 5 /lpf Final  
  Troponin-I, Qt. 07/22/2018 <0.05  <0.05 ng/mL Final  
 Comment: The presence of detectable troponin above the reference range indicates myocardial injury which may be due to ischemia, myocarditis, trauma, etc. 
Clinical correlation is necessary to establish the significance of this finding. Sequential testing is recommended to determine if the typical rise and fall of cTnI is demonstrated. Note:  Cardiac troponin I has a relatively long half life and may be present well after the CK MB has returned to baseline. The reference range is based on the 99th percentile of the referent population. ASSESSMENT:  
1. Intracranial bleed ([facility] 2. Essential hypertension 3. Obesity, morbid ([facility] BP at home is usually in the 120s, therefore no adjustment in the medication will be made. BMI is discussed below and we certainly encouraged physical activity 30 minutes five days a week and a heart healthy, weight reducing diet. He has some habits and symptoms suggestive of obstructive sleep apnea, which will be evaluated. He'll be back to see us in about three months. We'll see him on a regular basis. We advised him if he has another illness we will see him at [facility], as our hospital of choice. He is invited to call us if he has any issues and invited to walk in to see us at any time should he need to see us and unable to get an appointment. Discussed the patient's BMI with him. The BMI follow up plan is as follows:  
 
dietary management education, guidance, and counseling 
encourage exercise 
monitor weight 
prescribed dietary intake I have discussed the diagnosis with the patient and the intended plan as seen in the 
orders above. The patient understands and agees with the plan. The patient has  
received an after visit summary and questions were answered concerning 
future plans Patient labs and/or xrays were reviewed Past records were reviewed. PLAN: 
. Orders Placed This Encounter  LIPID PANEL  
 TSH 3RD GENERATION  
 HEMOGLOBIN A1C WITH EAG  
 HEPATITIS PANEL, ACUTE  amLODIPine (NORVASC) 5 mg tablet  hydroCHLOROthiazide (HYDRODIURIL) 12.5 mg tablet  metoprolol tartrate (LOPRESSOR) 50 mg tablet Follow-up Disposition: 
Return in about 3 months (around 12/4/2018). ATTENTION:  
This medical record was transcribed using an electronic medical records system. Although proofread, it may and can contain electronic and spelling errors. Other human spelling and other errors may be present. Corrections may be executed at a later time. Please feel free to contact us for any clarifications as needed.

## 2018-09-04 NOTE — MR AVS SNAPSHOT
[facility] 
 
 
 [address] [address] 
[phone] Patient: Beatriz Rodríguez MRN: [id number] Visit Information Date & Time Provider Department Dept. Phone Encounter #  
 9/4/2018 11:00 AM Francisco Lewis [facility] Sports Medicine and [address] [id number] Follow-up Instructions Return in about 3 months (around 12/4/2018). Follow-up and Disposition History Your Appointments 11/8/2018  1:40 PM  
Follow Up with Evelyn Fernando DO [facility] at [address]) Appt Note: 3 month f/u  
 [facility] [address] [facility] [address]  
[phone]  
  
   
 [address] [facility] [address] [facility] [address]  
  
    
 12/6/2018  9:30 AM  
Any with Makeda Brown MD  
[address] [place] [address] and Primary Care [address] [place] [address]) Appt Note: 3 Month Follow Up  
 [address] [address] [address] [id number] Upcoming Health Maintenance Date Due DTaP/Tdap/Td series (1 - Tdap) 9/4/2019* Influenza Age 5 to Adult 9/4/2019* *Topic was postponed. The date shown is not the original due date. Allergies as of 9/4/2018  Review Complete On: 9/4/2018 By: Makeda Brown MD  
 No Known Allergies Current Immunizations  Never Reviewed No immunizations on file. Not reviewed this visit You Were Diagnosed With   
  
 Codes Comments Intracranial bleed ([facility]    -  Primary ICD-10-CM: I62.9 ICD-9-CM: 432.9 Essential hypertension     ICD-10-CM: I10 
ICD-9-CM: 401.9 Obesity, morbid ([facility]     ICD-10-CM: E66.01 
ICD-9-CM: 278.01   
 GALO (obstructive sleep apnea)     ICD-10-CM: G47.33 
ICD-9-CM: 327.23 Vitals BP Pulse Temp Resp Height(growth percentile) Weight(growth percentile) 126/80 72 97.8 °F (36.6 °C) (Oral) 18 5' 9\" (1.753 m) 271 lb 1.6 oz (123 kg) SpO2 BMI Smoking Status 97% 40.03 kg/m2 Never Smoker Vitals History BMI and BSA Data Body Mass Index Body Surface Area 40.03 kg/m 2 2.45 m 2 Preferred Pharmacy Pharmacy Name Phone [facility] [phone] Your Updated Medication List  
  
   
This list is accurate as of 9/4/18  1:53 PM.  Always use your most recent med list. amLODIPine 5 mg tablet Commonly known as:  Latoya Pace Take 1 Tab by mouth daily. hydroCHLOROthiazide 12.5 mg tablet Commonly known as:  HYDRODIURIL Take 1 Tab by mouth daily. metoprolol tartrate 50 mg tablet Commonly known as:  LOPRESSOR Take 1 Tab by mouth two (2) times a day. Prescriptions Sent to Pharmacy Refills  
 amLODIPine (NORVASC) 5 mg tablet 3 Sig: Take 1 Tab by mouth daily. Class: Normal  
 Pharmacy: [address] [place] [address] Ph #: [phone] Route: Oral  
 hydroCHLOROthiazide (HYDRODIURIL) 12.5 mg tablet 3 Sig: Take 1 Tab by mouth daily. Class: Normal  
 Pharmacy: [address] [place] [address] Ph #: [phone] Route: Oral  
 metoprolol tartrate (LOPRESSOR) 50 mg tablet 2 Sig: Take 1 Tab by mouth two (2) times a day. Class: Normal  
 Pharmacy: [address] [place] [address] Ph #: [phone] Route: Oral  
  
We Performed the Following COLLECTION VENOUS BLOOD,VENIPUNCTURE [id number] CPT(R)] HEMOGLOBIN A1C WITH EAG [30492 CPT(R)] HEPATITIS PANEL, ACUTE [42860 CPT(R)] LIPID PANEL [59109 CPT(R)] SLEEP MEDICINE REFERRAL [[id number] Custom] Comments:  
 Orders: 
Sleep Medicine Consult - Schedule patient for a sleep specialist consult. If appropriate, schedule patient for sleep study(s). Initiate treatment if needed. Forward correspondance to my office. TSH 3RD GENERATION [89666 CPT(R)] Follow-up Instructions Return in about 3 months (around 12/4/2018). Referral Information Referral ID Referred By Referred To  
  
 [id number] Beto KING Not Available Visits Status Start Date End Date 1 New Request 9/4/18 9/4/19 If your referral has a status of pending review or denied, additional information will be sent to support the outcome of this decision. Patient Instructions Body Mass Index: Care Instructions Your Care Instructions Body mass index (BMI) can help you see if your weight is raising your risk for health problems. It uses a formula to compare how much you weigh with how tall you are. · A BMI lower than 18.5 is considered underweight. · A BMI between 18.5 and 24.9 is considered healthy. · A BMI between 25 and 29.9 is considered overweight. A BMI of 30 or higher is considered obese. If your BMI is in the normal range, it means that you have a lower risk for weight-related health problems. If your BMI is in the overweight or obese range, you may be at increased risk for weight-related health problems, such as high blood pressure, heart disease, stroke, arthritis or joint pain, and diabetes. If your BMI is in the underweight range, you may be at increased risk for health problems such as fatigue, lower protection (immunity) against illness, muscle loss, bone loss, hair loss, and hormone problems. BMI is just one measure of your risk for weight-related health problems. You may be at higher risk for health problems if you are not active, you eat an unhealthy diet, or you drink too much alcohol or use tobacco products. Follow-up care is a key part of your treatment and safety. Be sure to make and go to all appointments, and call your doctor if you are having problems. It's also a good idea to know your test results and keep a list of the medicines you take. How can you care for yourself at home? · Practice healthy eating habits. This includes eating plenty of fruits, vegetables, whole grains, lean protein, and low-fat dairy. · If your doctor recommends it, get more exercise. Walking is a good choice. Bit by bit, increase the amount you walk every day. Try for at least 30 minutes on most days of the week. · Do not smoke. Smoking can increase your risk for health problems. If you need help quitting, talk to your doctor about stop-smoking programs and medicines. These can increase your chances of quitting for good. · Limit alcohol to 2 drinks a day for men and 1 drink a day for women. Too much alcohol can cause health problems. If you have a BMI higher than 25 · Your doctor may do other tests to check your risk for weight-related health problems. This may include measuring the distance around your waist. A waist measurement of more than 40 inches in men or 35 inches in women can increase the risk of weight-related health problems. · Talk with your doctor about steps you can take to stay healthy or improve your health. You may need to make lifestyle changes to lose weight and stay healthy, such as changing your diet and getting regular exercise. If you have a BMI lower than 18.5 · Your doctor may do other tests to check your risk for health problems. · Talk with your doctor about steps you can take to stay healthy or improve your health. You may need to make lifestyle changes to gain or maintain weight and stay healthy, such as getting more healthy foods in your diet and doing exercises to build muscle. Where can you learn more? Go to http://shanique-milo.info/. Enter S176 in the search box to learn more about \"Body Mass Index: Care Instructions. \" Current as of: October 13, 2016 Content Version: 11.4 © 7032-5885 [employer].  Care instructions adapted under license by [employer] (which disclaims liability or warranty for this information). If you have questions about a medical condition or this instruction, always ask your healthcare professional. [address] any warranty or liability for your use of this information. Introducing [facility] SERVICES! Yakov Kapadia introduces E[employer] patient portal. Now you can access parts of your medical record, email your doctor's office, and request medication refills online. 1. In your internet browser, go to [email or web address]. [email or web address] 2. Click on the First Time User? Click Here link in the Sign In box. You will see the New Member Sign Up page. 3. Enter your [employer] Access Code exactly as it appears below. You will not need to use this code after youve completed the sign-up process. If you do not sign up before the expiration date, you must request a new code. · [employer] Access Code: [email or web address] Expires: 10/20/2018  1:47 PM 
 
4. Enter the last four digits of your Social Security Number (xxxx) and Date of Birth (mm/dd/yyyy) as indicated and click Submit. You will be taken to the next sign-up page. 5. Create a [employer]hum ID. This will be your [employer] login ID and cannot be changed, so think of one that is secure and easy to remember. 6. Create a [employer] password. You can change your password at any time. 7. Enter your Password Reset Question and Answer. This can be used at a later time if you forget your password. 8. Enter your e-mail address. You will receive e-mail notification when new information is available in [address] 9. Click Sign Up. You can now view and download portions of your medical record. 10. Click the Download Summary menu link to download a portable copy of your medical information. If you have questions, please visit the Frequently Asked Questions section of the [employer] website. Remember, [employer] is NOT to be used for urgent needs. For medical emergencies, dial 911. Now available from your iPhone and Android! Please provide this summary of care documentation to your next provider. Your primary care clinician is listed as Bc Dickerson. If you have any questions after today's visit, please call [phone].

## 2018-09-04 NOTE — PATIENT INSTRUCTIONS
Body Mass Index: Care Instructions Your Care Instructions Body mass index (BMI) can help you see if your weight is raising your risk for health problems. It uses a formula to compare how much you weigh with how tall you are. · A BMI lower than 18.5 is considered underweight. · A BMI between 18.5 and 24.9 is considered healthy. · A BMI between 25 and 29.9 is considered overweight. A BMI of 30 or higher is considered obese. If your BMI is in the normal range, it means that you have a lower risk for weight-related health problems. If your BMI is in the overweight or obese range, you may be at increased risk for weight-related health problems, such as high blood pressure, heart disease, stroke, arthritis or joint pain, and diabetes. If your BMI is in the underweight range, you may be at increased risk for health problems such as fatigue, lower protection (immunity) against illness, muscle loss, bone loss, hair loss, and hormone problems. BMI is just one measure of your risk for weight-related health problems. You may be at higher risk for health problems if you are not active, you eat an unhealthy diet, or you drink too much alcohol or use tobacco products. Follow-up care is a key part of your treatment and safety. Be sure to make and go to all appointments, and call your doctor if you are having problems. It's also a good idea to know your test results and keep a list of the medicines you take. How can you care for yourself at home? · Practice healthy eating habits. This includes eating plenty of fruits, vegetables, whole grains, lean protein, and low-fat dairy. · If your doctor recommends it, get more exercise. Walking is a good choice. Bit by bit, increase the amount you walk every day. Try for at least 30 minutes on most days of the week. · Do not smoke. Smoking can increase your risk for health problems.  If you need help quitting, talk to your doctor about stop-smoking programs and medicines. These can increase your chances of quitting for good. · Limit alcohol to 2 drinks a day for men and 1 drink a day for women. Too much alcohol can cause health problems. If you have a BMI higher than 25 · Your doctor may do other tests to check your risk for weight-related health problems. This may include measuring the distance around your waist. A waist measurement of more than 40 inches in men or 35 inches in women can increase the risk of weight-related health problems. · Talk with your doctor about steps you can take to stay healthy or improve your health. You may need to make lifestyle changes to lose weight and stay healthy, such as changing your diet and getting regular exercise. If you have a BMI lower than 18.5 · Your doctor may do other tests to check your risk for health problems. · Talk with your doctor about steps you can take to stay healthy or improve your health. You may need to make lifestyle changes to gain or maintain weight and stay healthy, such as getting more healthy foods in your diet and doing exercises to build muscle. Where can you learn more? Go to http://shanique-milo.info/. Enter S176 in the search box to learn more about \"Body Mass Index: Care Instructions. \" Current as of: October 13, 2016 Content Version: 11.4 © 3797-5253 Healthwise, Incorporated. Care instructions adapted under license by [employer] (which disclaims liability or warranty for this information). If you have questions about a medical condition or this instruction, always ask your healthcare professional. [address] any warranty or liability for your use of this information.

## 2018-09-05 LAB
CHOLEST SERPL-MCNC: 216 MG/DL (ref 100–199)
EST. AVERAGE GLUCOSE BLD GHB EST-MCNC: 114 MG/DL
HAV IGM SERPL QL IA: NEGATIVE
HBA1C MFR BLD: 5.6 % (ref 4.8–5.6)
HBV CORE IGM SERPL QL IA: NEGATIVE
HBV SURFACE AG SERPL QL IA: NEGATIVE
HCV AB S/CO SERPL IA: 0.1 S/CO RATIO (ref 0–0.9)
HDLC SERPL-MCNC: 49 MG/DL
LDLC SERPL CALC-MCNC: 152 MG/DL (ref 0–99)
TRIGL SERPL-MCNC: 73 MG/DL (ref 0–149)
TSH SERPL DL<=0.005 MIU/L-ACNC: 0.68 UIU/ML (ref 0.45–4.5)
VLDLC SERPL CALC-MCNC: 15 MG/DL (ref 5–40)

## 2018-12-06 ENCOUNTER — OFFICE VISIT (OUTPATIENT)
Dept: INTERNAL MEDICINE CLINIC | Age: 38
End: 2018-12-06

## 2018-12-06 VITALS
DIASTOLIC BLOOD PRESSURE: 71 MMHG | HEART RATE: 74 BPM | TEMPERATURE: 97.9 F | SYSTOLIC BLOOD PRESSURE: 125 MMHG | OXYGEN SATURATION: 97 % | RESPIRATION RATE: 18 BRPM | WEIGHT: 268.5 LBS | BODY MASS INDEX: 39.77 KG/M2 | HEIGHT: 69 IN

## 2018-12-06 DIAGNOSIS — M72.2 PLANTAR FASCIITIS, RIGHT: ICD-10-CM

## 2018-12-06 DIAGNOSIS — I62.9 INTRACRANIAL BLEED (HCC): ICD-10-CM

## 2018-12-06 DIAGNOSIS — I61.9 NONTRAUMATIC INTRACEREBRAL HEMORRHAGE, UNSPECIFIED CEREBRAL LOCATION, UNSPECIFIED LATERALITY (HCC): ICD-10-CM

## 2018-12-06 DIAGNOSIS — I10 ESSENTIAL HYPERTENSION: Primary | ICD-10-CM

## 2018-12-06 DIAGNOSIS — M19.90 ARTHRITIS: ICD-10-CM

## 2018-12-06 NOTE — PROGRESS NOTES
1. Have you been to the ER, urgent care clinic since your last visit? Hospitalized since your last visit? No    2. Have you seen or consulted any other health care providers outside of the [address] [place] [address] since your last visit? Include any pap smears or colon screening. No     Complaints of off and on pain in right heel.

## 2018-12-06 NOTE — PATIENT INSTRUCTIONS
Plantar Fasciitis: Care Instructions  Your Care Instructions    Plantar fasciitis is pain and inflammation of the plantar fascia, the tissue at the bottom of your foot that connects the heel bone to the toes. The plantar fascia also supports the arch. If you strain the plantar fascia, it can develop small tears and cause heel pain when you stand or walk. Plantar fasciitis can be caused by running or other sports. It also may occur in people who are overweight or who have high arches or flat feet. You may get plantar fasciitis if you walk or stand for long periods, or have a tight Achilles tendon or calf muscles. You can improve your foot pain with rest and other care at home. It might take a few weeks to a few months for your foot to heal completely. Follow-up care is a key part of your treatment and safety. Be sure to make and go to all appointments, and call your doctor if you are having problems. It's also a good idea to know your test results and keep a list of the medicines you take. How can you care for yourself at home? · Rest your feet often. Reduce your activity to a level that lets you avoid pain. If possible, do not run or walk on hard surfaces. · Take pain medicines exactly as directed. ? If the doctor gave you a prescription medicine for pain, take it as prescribed. ? If you are not taking a prescription pain medicine, take an over-the-counter anti-inflammatory medicine for pain and swelling, such as ibuprofen (Advil, Motrin) or naproxen (Aleve). Read and follow all instructions on the label. · Use ice massage to help with pain and swelling. You can use an ice cube or an ice cup several times a day. To make an ice cup, fill a paper cup with water and freeze it. Cut off the top of the cup until a half-inch of ice shows. Hold onto the remaining paper to use the cup. Rub the ice in small circles over the area for 5 to 7 minutes.   · Contrast baths, which alternate hot and cold water, can also help reduce swelling. But because heat alone may make pain and swelling worse, end a contrast bath with a soak in cold water. · Wear a night splint if your doctor suggests it. A night splint holds your foot with the toes pointed up and the foot and ankle at a 90-degree angle. This position gives the bottom of your foot a constant, gentle stretch. · Do simple exercises such as calf stretches and towel stretches 2 to 3 times each day, especially when you first get up in the morning. These can help the plantar fascia become more flexible. They also make the muscles that support your arch stronger. Hold these stretches for 15 to 30 seconds per stretch. Repeat 2 to 4 times. ? Stand about 1 foot from a wall. Place the palms of both hands against the wall at chest level. Lean forward against the wall, keeping one leg with the knee straight and heel on the ground while bending the knee of the other leg.  ? Sit down on the floor or a mat with your feet stretched in front of you. Roll up a towel lengthwise, and loop it over the ball of your foot. Holding the towel at both ends, gently pull the towel toward you to stretch your foot. · Wear shoes with good arch support. Athletic shoes or shoes with a well-cushioned sole are good choices. · Try heel cups or shoe inserts (orthotics) to help cushion your heel. You can buy these at many shoe stores. · Put on your shoes as soon as you get out of bed. Going barefoot or wearing slippers may make your pain worse. · Reach and stay at a good weight for your height. This puts less strain on your feet. When should you call for help? Call your doctor now or seek immediate medical care if:    · You have heel pain with fever, redness, or warmth in your heel.     · You cannot put weight on the sore foot.    Watch closely for changes in your health, and be sure to contact your doctor if:    · You have numbness or tingling in your heel.     · Your heel pain lasts more than 2 weeks. Where can you learn more? Go to http://shanique-milo.info/. Rise Levin in the search box to learn more about \"Plantar Fasciitis: Care Instructions. \"  Current as of: November 29, 2017  Content Version: 11.8  © 5588-0227 [employer]. Care instructions adapted under license by [employer] (which disclaims liability or warranty for this information). If you have questions about a medical condition or this instruction, always ask your healthcare professional. [address] any warranty or liability for your use of this information. Plantar Fasciitis: Exercises  Your Care Instructions  Here are some examples of typical rehabilitation exercises for your condition. Start each exercise slowly. Ease off the exercise if you start to have pain. Your doctor or physical therapist will tell you when you can start these exercises and which ones will work best for you. How to do the exercises  Towel stretch    1. Sit with your legs extended and knees straight. 2. Place a towel around your foot just under the toes. 3. Hold each end of the towel in each hand, with your hands above your knees. 4. Pull back with the towel so that your foot stretches toward you. 5. Hold the position for at least 15 to 30 seconds. 6. Repeat 2 to 4 times a session, up to 5 sessions a day. Calf stretch    1. Stand facing a wall with your hands on the wall at about eye level. Put the leg you want to stretch about a step behind your other leg. 2. Keeping your back heel on the floor, bend your front knee until you feel a stretch in the back leg. 3. Hold the stretch for 15 to 30 seconds. Repeat 2 to 4 times. Plantar fascia and calf stretch    1. Stand on a step as shown above. Be sure to hold on to the banister. 2. Slowly let your heels down over the edge of the step as you relax your calf muscles.  You should feel a gentle stretch across the bottom of your foot and up the back of your leg to your knee. 3. Hold the stretch about 15 to 30 seconds, and then tighten your calf muscle a little to bring your heel back up to the level of the step. Repeat 2 to 4 times. Towel curls    1. While sitting, place your foot on a towel on the floor and scrunch the towel toward you with your toes. 2. Then, also using your toes, push the towel away from you. [place] pickups    1. Put marbles on the floor next to a cup.  2. Using your toes, try to lift the marbles up from the floor and put them in the cup. Follow-up care is a key part of your treatment and safety. Be sure to make and go to all appointments, and call your doctor if you are having problems. It's also a good idea to know your test results and keep a list of the medicines you take. Where can you learn more? Go to http://shanique-milo.info/. Lucille Bull in the search box to learn more about \"Plantar Fasciitis: Exercises. \"  Current as of: November 29, 2017  Content Version: 11.8  © 9301-1411 Healthwise, Incorporated. Care instructions adapted under license by [employer] (which disclaims liability or warranty for this information). If you have questions about a medical condition or this instruction, always ask your healthcare professional. [address] any warranty or liability for your use of this information.

## 2018-12-06 NOTE — PROGRESS NOTES
SPORTS MEDICINE AND PRIMARY CARE  Taylor Galicia MD, [address] [address]  Phone:  [phone]  Fax: [phone]       Chief Complaint   Patient presents with    Hypertension     f/u   . SUBJECTIVE:    Aron Thomas is a 45 y.o. male Patient returns today with intracerebral hemorrhage, primary hypertension, degenerative joint disease, and is seen for evaluation. He is followed by Dr. Esperanza Wolf for the presentation located over the frontal region bilaterally retroorbital area. He has had no seizures. The etiology of his ICH was most likely hypertensive. Headaches had improved. She had planned to follow up to see him in November, but I do not see that he has done that. Patient is seen for evaluation. Patient returns today with his mom, Lisbeth Little, whom we also see at [facility] periodically. He complains of right heel discomfort and is seen for evaluation. He skipped his last appointment with neurology. Current Outpatient Medications   Medication Sig Dispense Refill    amLODIPine (NORVASC) 5 mg tablet Take 1 Tab by mouth daily. 90 Tab 3    hydroCHLOROthiazide (HYDRODIURIL) 12.5 mg tablet Take 1 Tab by mouth daily. 90 Tab 3    metoprolol tartrate (LOPRESSOR) 50 mg tablet Take 1 Tab by mouth two (2) times a day. 180 Tab 2     Past Medical History:   Diagnosis Date    Arthritis     Headache     Hypertension     [facility] 07/22/2018    STM    Plantar fasciitis, right 12/06/2018     History reviewed. No pertinent surgical history.   No Known Allergies      REVIEW OF SYSTEMS:  General: negative for - chills or fever  ENT: negative for - headaches, nasal congestion or tinnitus  Respiratory: negative for - cough, hemoptysis, shortness of breath or wheezing  Cardiovascular : negative for - chest pain, edema, palpitations or shortness of breath  Gastrointestinal: negative for - abdominal pain, blood in stools, heartburn or nausea/vomiting  Genito-Urinary: no dysuria, trouble voiding, or hematuria  Musculoskeletal: negative for - gait disturbance, joint pain, joint stiffness or joint swelling  Neurological: no TIA or stroke symptoms  Hematologic: no bruises, no bleeding, no swollen glands  Integument: no lumps, mole changes, nail changes or rash  Endocrine: no malaise/lethargy or unexpected weight changes      Social History     Socioeconomic History    Marital status: SINGLE     Spouse name: Not on file    Number of children: Not on file    Years of education: Not on file    Highest education level: Not on file   Tobacco Use    Smoking status: Never Smoker    Smokeless tobacco: Never Used   Substance and Sexual Activity    Alcohol use: No    Drug use: No    Sexual activity: Yes     Partners: Female     Birth control/protection: None   Social History Narrative    Patient is a lifetime nonsmoker, non drinker, non drug abuser.         Social History:  The patient is single, lives with his girlfriend who is also the father of his three children, 16, 12 and 2. He completed the 12th grade. 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         Family History:  Father is 54, alive and well. Mother [address] [place] [address]  with diabetes. Three sisters are alive and well. Family History   Problem Relation Age of Onset    Hypertension Mother     Heart Disease Father        OBJECTIVE:    Visit Vitals  /71   Pulse 74   Temp 97.9 °F (36.6 °C) (Oral)   Resp 18   Ht 5' 9\" (1.753 m)   Wt 268 lb 8 oz (121.8 kg)   SpO2 97%   BMI 39.65 kg/m²     CONSTITUTIONAL: well , well nourished, appears age appropriate  EYES: perrla, eom intact  ENMT:moist mucous membranes, pharynx clear  NECK: supple.  Thyroid normal  RESPIRATORY: Chest: clear bilaterally   CARDIOVASCULAR: Heart: regular rate and rhythm  GASTROINTESTINAL: Abdomen: soft, bowel sounds active  HEMATOLOGIC: no pathological lymph nodes palpated  MUSCULOSKELETAL: Extremities: no edema, pulse 1+ INTEGUMENT: No unusual rashes or suspicious skin lesions noted. Nails appear normal.  NEUROLOGIC: non-focal exam   MENTAL STATUS: alert and oriented, appropriate affect           ASSESSMENT:  1. Essential hypertension    2. Nontraumatic intracerebral hemorrhage, unspecified cerebral location, unspecified laterality ([address]    3. Arthritis    4. Intracranial bleed ([address]    5. Plantar fasciitis, right      Patient has a plantar fasciitis, for which we give him appropriate exercises and information. His blood pressure is 125/71, which is completely acceptable. We would like it to be in the 120s, preferably even in the teens, particularly in view of his history. We encourage him to continue to take his blood pressure pills on a regular basis. We also encourage him to continue a heart healthy, weight reducing diet. Since we last saw him he has lost about 3 pounds, which is excellent, particularly in view of the fact that it was Thanksgiving. He will be back to see me in about three months, sooner if he has any problems. I have discussed the diagnosis with the patient and the intended plan as seen in the  orders above. The patient understands and agees with the plan. The patient has   received an after visit summary and questions were answered concerning  future plans  Patient labs and/or xrays were reviewed  Past records were reviewed. PLAN:  .  Orders Placed This [address] [place] [address] Neurology ref [facility]       Follow-up Disposition:  Return in about 3 months (around 3/6/2019). ATTENTION:   This medical record was transcribed using an electronic medical records system. Although proofread, it may and can contain electronic and spelling errors. Other human spelling and other errors may be present. Corrections may be executed at a later time. Please feel free to contact us for any clarifications as needed.

## 2019-03-07 ENCOUNTER — OFFICE VISIT (OUTPATIENT)
Dept: INTERNAL MEDICINE CLINIC | Age: 39
End: 2019-03-07

## 2019-03-07 VITALS
SYSTOLIC BLOOD PRESSURE: 131 MMHG | HEART RATE: 72 BPM | RESPIRATION RATE: 18 BRPM | TEMPERATURE: 98.2 F | HEIGHT: 69 IN | BODY MASS INDEX: 39.71 KG/M2 | OXYGEN SATURATION: 95 % | DIASTOLIC BLOOD PRESSURE: 78 MMHG | WEIGHT: 268.1 LBS

## 2019-03-07 DIAGNOSIS — E66.01 OBESITY, MORBID (HCC): ICD-10-CM

## 2019-03-07 DIAGNOSIS — M19.90 ARTHRITIS: ICD-10-CM

## 2019-03-07 DIAGNOSIS — I61.9 NONTRAUMATIC INTRACEREBRAL HEMORRHAGE, UNSPECIFIED CEREBRAL LOCATION, UNSPECIFIED LATERALITY (HCC): ICD-10-CM

## 2019-03-07 DIAGNOSIS — I10 ESSENTIAL HYPERTENSION: ICD-10-CM

## 2019-03-07 DIAGNOSIS — I62.9 INTRACRANIAL BLEED (HCC): ICD-10-CM

## 2019-03-07 DIAGNOSIS — M72.2 PLANTAR FASCIITIS, RIGHT: Primary | ICD-10-CM

## 2019-03-07 DIAGNOSIS — E66.9 OBESITY (BMI 35.0-39.9 WITHOUT COMORBIDITY): ICD-10-CM

## 2019-03-07 NOTE — PROGRESS NOTES
1. Have you been to the ER, urgent care clinic since your last visit? Hospitalized since your last visit? No    2. Have you seen or consulted any other health care providers outside of the [address] [place] [address] since your last visit? Include any pap smears or colon screening.  No    Wants to discuss right foot

## 2019-03-07 NOTE — PROGRESS NOTES
SPORTS MEDICINE AND PRIMARY CARE  Konstantin Ospina MD, [address] [address]  Phone:  [phone]  Fax: [phone]       Chief Complaint   Patient presents with    Hypertension   . SUBJECTIVE:    Deanne Guzman is a 44 y.o. male Patient returns today with known history of primary hypertension, dyslipidemia, intracerebral hemorrhage 07/22/18, right plantar fasciitis, headaches and arthritis and morbid obesity. Patient returns today complaining of continued right foot discomfort. He does not do the exercises regularly. Wonders if he can use ____ to allow his erectile function to last longer. Patient is seen for evaluation. Current Outpatient Medications   Medication Sig Dispense Refill    amLODIPine (NORVASC) 5 mg tablet Take 1 Tab by mouth daily. 90 Tab 3    hydroCHLOROthiazide (HYDRODIURIL) 12.5 mg tablet Take 1 Tab by mouth daily. 90 Tab 3    metoprolol tartrate (LOPRESSOR) 50 mg tablet Take 1 Tab by mouth two (2) times a day. 180 Tab 2     Past Medical History:   Diagnosis Date    Arthritis     Headache     Hypertension     [facility] 07/22/2018    STM    Obesity (BMI 35.0-39.9 without comorbidity)     Plantar fasciitis, right 12/06/2018     History reviewed. No pertinent surgical history.   No Known Allergies      REVIEW OF SYSTEMS:  General: negative for - chills or fever  ENT: negative for - headaches, nasal congestion or tinnitus  Respiratory: negative for - cough, hemoptysis, shortness of breath or wheezing  Cardiovascular : negative for - chest pain, edema, palpitations or shortness of breath  Gastrointestinal: negative for - abdominal pain, blood in stools, heartburn or nausea/vomiting  Genito-Urinary: no dysuria, trouble voiding, or hematuria  Musculoskeletal: negative for - gait disturbance, joint pain, joint stiffness or joint swelling  Neurological: no TIA or stroke symptoms  Hematologic: no bruises, no bleeding, no swollen glands  Integument: no lumps, mole changes, nail changes or rash  Endocrine: no malaise/lethargy or unexpected weight changes      Social History     Socioeconomic History    Marital status: SINGLE     Spouse name: Not on file    Number of children: Not on file    Years of education: Not on file    Highest education level: Not on file   Tobacco Use    Smoking status: Never Smoker    Smokeless tobacco: Never Used   Substance and Sexual Activity    Alcohol use: No    Drug use: No    Sexual activity: Yes     Partners: Female     Birth control/protection: None   Social History Narrative    Patient is a lifetime nonsmoker, non drinker, non drug abuser.         Social History:  The patient is single, lives with his girlfriend who is also the father of his three children, 16, 12 and 2. He completed the 12th grade. 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         Family History:  Father is 54, alive and well. Mother 62 - [address] [place] [address]  with diabetes. Three sisters are alive and well. Family History   Problem Relation Age of Onset    Hypertension Mother     Heart Disease Father        OBJECTIVE:    Visit Vitals  /78   Pulse 72   Temp 98.2 °F (36.8 °C) (Oral)   Resp 18   Ht 5' 9\" (1.753 m)   Wt 268 lb 1.6 oz (121.6 kg)   SpO2 95%   BMI 39.59 kg/m²     CONSTITUTIONAL: well , well nourished, appears age appropriate  EYES: perrla, eom intact  ENMT:moist mucous membranes, pharynx clear  NECK: supple. Thyroid normal  RESPIRATORY: Chest: clear bilaterally   CARDIOVASCULAR: Heart: regular rate and rhythm  GASTROINTESTINAL: Abdomen: soft, bowel sounds active  HEMATOLOGIC: no pathological lymph nodes palpated  MUSCULOSKELETAL: Extremities: no edema, pulse 1+   INTEGUMENT: No unusual rashes or suspicious skin lesions noted. Nails appear normal.  NEUROLOGIC: non-focal exam   MENTAL STATUS: alert and oriented, appropriate affect           ASSESSMENT:  1. Plantar fasciitis, right    2.  Nontraumatic intracerebral hemorrhage, unspecified cerebral location, unspecified laterality ([address]    3. Arthritis    4. Essential hypertension    5. Obesity, morbid ([address]    6. Intracranial bleed (HCC)    7. Obesity (BMI 35.0-39.9 without comorbidity)      His blood pressure is nearly at goal.  We encouraged compliance. We note that his BMI reflects no change in his weight from December and we encouraged physical activity 30 minutes five days a week and a heart healthy, weight reducing diet. We talked to him about his diet and the need to avoid salt and fast foods in view of his history and the need to be sure to keep his blood pressure as close to 120/80 or less. We certainly encourage the weight reduction. He has a plantar fasciitis on the right. He has been given exercises to do in the past, but he does not do them regularly. We in addition suggest he get a Dr. Amita Escobedo heel pad to put in his shoes, which will help also. He will be back to see us in about six months, sooner if she has any problems. I have discussed the diagnosis with the patient and the intended plan as seen in the  orders above. The patient understands and agees with the plan. The patient has   received an after visit summary and questions were answered concerning  future plans  Patient labs and/or xrays were reviewed  Past records were reviewed. PLAN:  . No orders of the defined types were placed in this encounter. Follow-up Disposition:  Return in about 6 months (around 9/7/2019). ATTENTION:   This medical record was transcribed using an electronic medical records system. Although proofread, it may and can contain electronic and spelling errors. Other human spelling and other errors may be present. Corrections may be executed at a later time. Please feel free to contact us for any clarifications as needed.

## 2019-05-30 RX ORDER — HYDROCHLOROTHIAZIDE 12.5 MG/1
12.5 TABLET ORAL DAILY
Qty: 90 TAB | Refills: 3 | Status: SHIPPED | OUTPATIENT
Start: 2019-05-30 | End: 2019-06-06 | Stop reason: SDUPTHER

## 2019-05-30 RX ORDER — AMLODIPINE BESYLATE 5 MG/1
5 TABLET ORAL DAILY
Qty: 90 TAB | Refills: 3 | Status: SHIPPED | OUTPATIENT
Start: 2019-05-30 | End: 2019-06-06 | Stop reason: SDUPTHER

## 2019-05-30 RX ORDER — METOPROLOL TARTRATE 50 MG/1
50 TABLET ORAL 2 TIMES DAILY
Qty: 180 TAB | Refills: 3 | Status: SHIPPED | OUTPATIENT
Start: 2019-05-30 | End: 2019-06-06 | Stop reason: SDUPTHER

## 2019-06-06 RX ORDER — HYDROCHLOROTHIAZIDE 12.5 MG/1
12.5 TABLET ORAL DAILY
Qty: 90 TAB | Refills: 3 | Status: SHIPPED | OUTPATIENT
Start: 2019-06-06 | End: 2020-07-22

## 2019-06-06 RX ORDER — AMLODIPINE BESYLATE 5 MG/1
5 TABLET ORAL DAILY
Qty: 90 TAB | Refills: 3 | Status: SHIPPED | OUTPATIENT
Start: 2019-06-06 | End: 2020-07-22

## 2019-06-06 RX ORDER — METOPROLOL TARTRATE 50 MG/1
50 TABLET ORAL 2 TIMES DAILY
Qty: 180 TAB | Refills: 3 | Status: SHIPPED | OUTPATIENT
Start: 2019-06-06 | End: 2020-05-14

## 2019-09-12 ENCOUNTER — OFFICE VISIT (OUTPATIENT)
Dept: INTERNAL MEDICINE CLINIC | Age: 39
End: 2019-09-12

## 2019-09-12 VITALS
SYSTOLIC BLOOD PRESSURE: 123 MMHG | OXYGEN SATURATION: 96 % | HEART RATE: 64 BPM | BODY MASS INDEX: 39.13 KG/M2 | DIASTOLIC BLOOD PRESSURE: 83 MMHG | HEIGHT: 69 IN | TEMPERATURE: 97.7 F | WEIGHT: 264.2 LBS | RESPIRATION RATE: 16 BRPM

## 2019-09-12 DIAGNOSIS — I10 ESSENTIAL HYPERTENSION: Primary | ICD-10-CM

## 2019-09-12 DIAGNOSIS — M19.90 ARTHRITIS: ICD-10-CM

## 2019-09-12 DIAGNOSIS — M72.2 PLANTAR FASCIITIS, RIGHT: ICD-10-CM

## 2019-09-12 DIAGNOSIS — I62.9 INTRACRANIAL BLEED (HCC): ICD-10-CM

## 2019-09-12 DIAGNOSIS — E66.9 OBESITY (BMI 35.0-39.9 WITHOUT COMORBIDITY): ICD-10-CM

## 2019-09-12 DIAGNOSIS — R10.30 LOWER ABDOMINAL PAIN: ICD-10-CM

## 2019-09-12 PROBLEM — R10.9 ABDOMINAL PAIN: Status: ACTIVE | Noted: 2019-09-12

## 2019-09-12 RX ORDER — OMEPRAZOLE 40 MG/1
40 CAPSULE, DELAYED RELEASE ORAL DAILY
Qty: 30 CAP | Refills: 3 | Status: SHIPPED | OUTPATIENT
Start: 2019-09-12 | End: 2020-02-14 | Stop reason: ALTCHOICE

## 2019-09-12 NOTE — PATIENT INSTRUCTIONS
Body Mass Index: Care Instructions  Your Care Instructions    Body mass index (BMI) can help you see if your weight is raising your risk for health problems. It uses a formula to compare how much you weigh with how tall you are. · A BMI lower than 18.5 is considered underweight. · A BMI between 18.5 and 24.9 is considered healthy. · A BMI between 25 and 29.9 is considered overweight. A BMI of 30 or higher is considered obese. If your BMI is in the normal range, it means that you have a lower risk for weight-related health problems. If your BMI is in the overweight or obese range, you may be at increased risk for weight-related health problems, such as high blood pressure, heart disease, stroke, arthritis or joint pain, and diabetes. If your BMI is in the underweight range, you may be at increased risk for health problems such as fatigue, lower protection (immunity) against illness, muscle loss, bone loss, hair loss, and hormone problems. BMI is just one measure of your risk for weight-related health problems. You may be at higher risk for health problems if you are not active, you eat an unhealthy diet, or you drink too much alcohol or use tobacco products. Follow-up care is a key part of your treatment and safety. Be sure to make and go to all appointments, and call your doctor if you are having problems. It's also a good idea to know your test results and keep a list of the medicines you take. How can you care for yourself at home? · Practice healthy eating habits. This includes eating plenty of fruits, vegetables, whole grains, lean protein, and low-fat dairy. · If your doctor recommends it, get more exercise. Walking is a good choice. Bit by bit, increase the amount you walk every day. Try for at least 30 minutes on most days of the week. · Do not smoke. Smoking can increase your risk for health problems. If you need help quitting, talk to your doctor about stop-smoking programs and medicines. These can increase your chances of quitting for good. · Limit alcohol to 2 drinks a day for men and 1 drink a day for women. Too much alcohol can cause health problems. If you have a BMI higher than 25  · Your doctor may do other tests to check your risk for weight-related health problems. This may include measuring the distance around your waist. A waist measurement of more than 40 inches in men or 35 inches in women can increase the risk of weight-related health problems. · Talk with your doctor about steps you can take to stay healthy or improve your health. You may need to make lifestyle changes to lose weight and stay healthy, such as changing your diet and getting regular exercise. If you have a BMI lower than 18.5  · Your doctor may do other tests to check your risk for health problems. · Talk with your doctor about steps you can take to stay healthy or improve your health. You may need to make lifestyle changes to gain or maintain weight and stay healthy, such as getting more healthy foods in your diet and doing exercises to build muscle. Where can you learn more? Go to http://shanique-milo.info/. Enter S176 in the search box to learn more about \"Body Mass Index: Care Instructions. \"  Current as of: October 13, 2016  Content Version: 11.4  © 9821-7985 [employer]. Care instructions adapted under license by [employer] (which disclaims liability or warranty for this information). If you have questions about a medical condition or this instruction, always ask your healthcare professional. [address] any warranty or liability for your use of this information.

## 2019-09-12 NOTE — PROGRESS NOTES
SPORTS MEDICINE AND PRIMARY CARE  Ulises Luis MD, [address] [address]  Phone:  [phone]  Fax: [phone]       Chief Complaint   Patient presents with    Hypertension     f/u   . SUBJECTIVE:    Joselin Don is a 44 y.o. male Patient returns today with known history of primary hypertension, headaches, arthritis, obesity, plantar fasciitis and intracranial bleed. Patient comes in today complaining of lower abdominal discomfort. It started on Saturday after he ate some pizza. He has now developed diarrhea and just kept going like every 5-10 minutes it seemed like last night, although it slacked off today. There has been no nausea or vomiting. Patient is seen for evaluation. Current Outpatient Medications   Medication Sig Dispense Refill    omeprazole (PRILOSEC) 40 mg capsule Take 1 Cap by mouth daily. 30 Cap 3    amLODIPine (NORVASC) 5 mg tablet Take 1 Tab by mouth daily. 90 Tab 3    hydroCHLOROthiazide (HYDRODIURIL) 12.5 mg tablet Take 1 Tab by mouth daily. 90 Tab 3    metoprolol tartrate (LOPRESSOR) 50 mg tablet Take 1 Tab by mouth two (2) times a day. 180 Tab 3     Past Medical History:   Diagnosis Date    Abdominal pain 09/12/2019    Arthritis     Headache     Hypertension     [facility]    STM    Obesity (BMI 35.0-39.9 without comorbidity)     Plantar fasciitis, right 12/06/2018     History reviewed. No pertinent surgical history.   No Known Allergies      REVIEW OF SYSTEMS:  General: negative for - chills or fever  ENT: negative for - headaches, nasal congestion or tinnitus  Respiratory: negative for - cough, hemoptysis, shortness of breath or wheezing  Cardiovascular : negative for - chest pain, edema, palpitations or shortness of breath  Gastrointestinal: negative for - abdominal pain, blood in stools, heartburn or nausea/vomiting  Genito-Urinary: no dysuria, trouble voiding, or hematuria  Musculoskeletal: negative for - gait disturbance, joint pain, joint stiffness or joint swelling  Neurological: no TIA or stroke symptoms  Hematologic: no bruises, no bleeding, no swollen glands  Integument: no lumps, mole changes, nail changes or rash  Endocrine: no malaise/lethargy or unexpected weight changes      Social History     Socioeconomic History    Marital status: SINGLE     Spouse name: Not on file    Number of children: Not on file    Years of education: Not on file    Highest education level: Not on file   Tobacco Use    Smoking status: Never Smoker    Smokeless tobacco: Never Used   Substance and Sexual Activity    Alcohol use: No    Drug use: No    Sexual activity: Yes     Partners: Female     Birth control/protection: None   Social History Narrative    Patient is a lifetime nonsmoker, non drinker, non drug abuser.         Social History:  The patient is single, lives with his girlfriend who is also the father of his three children, 16, 12 and 2. He completed the 12th grade. He's [employer] and does [employer] work.         Family History:  Father is 54, alive and well. Mother 62 - [address] [facility]  with diabetes. Three sisters are alive and well. Family History   Problem Relation Age of Onset    Hypertension Mother     Heart Disease Father        OBJECTIVE:    Visit Vitals  /83   Pulse 64   Temp 97.7 °F (36.5 °C) (Oral)   Resp 16   Ht 5' 9\" (1.753 m)   Wt 264 lb 3.2 oz (119.8 kg)   SpO2 96%   BMI 39.02 kg/m²     CONSTITUTIONAL: well , well nourished, appears age appropriate  EYES: perrla, eom intact  ENMT:moist mucous membranes, pharynx clear  NECK: supple. Thyroid normal  RESPIRATORY: Chest: clear bilaterally   CARDIOVASCULAR: Heart: regular rate and rhythm  GASTROINTESTINAL: Abdomen: soft, bowel sounds active  HEMATOLOGIC: no pathological lymph nodes palpated  MUSCULOSKELETAL: Extremities: no edema, pulse 1+   INTEGUMENT: No unusual rashes or suspicious skin lesions noted. Nails appear normal.  NEUROLOGIC: non-focal exam   MENTAL STATUS: alert and oriented, appropriate affect           ASSESSMENT:  1. Essential hypertension    2. Arthritis    3. Obesity (BMI 35.0-39.9 without comorbidity)    4. Plantar fasciitis, right    5. Intracranial bleed ([address]    6. Lower abdominal pain      Patient is concerned about the abdominal pain, nausea and diarrhea. Will ask for a CT scan of the abdomen and pelvis to exclude significant pathology. He has minimal tenderness in the RUQ, so it may be related to gallbladder disease, particularly after he had a meal with a pizza. We suggest he put himself on a clear liquid diet until this diarrhea stops and he take Pepto Bismol for the diarrhea. Will also give him some Omeprazole to decrease the acid production, which may help some with the discomfort he is having. BP control is at goal.    BMI reflects 4 lb weight loss. We encouraged him to continue with weight loss program.  The intracranial bleed has been stable. He will be back to see us in six weeks if no improvement, otherwise within the year. I have discussed the diagnosis with the patient and the intended plan as seen in the  orders above. The patient understands and agees with the plan. The patient has   received an after visit summary and questions were answered concerning  future plans  Patient labs and/or xrays were reviewed  Past records were reviewed. PLAN:  .  Orders Placed This Encounter    CT ABD PELV W CONT    URINALYSIS W/ RFLX MICROSCOPIC    CBC WITH AUTOMATED DIFF    METABOLIC PANEL, COMPREHENSIVE    LIPID PANEL    HEMOGLOBIN A1C WITH EAG    omeprazole (PRILOSEC) 40 mg capsule       Follow-up and Dispositions    · Return in about 6 weeks (around 10/24/2019). ATTENTION:   This medical record was transcribed using an electronic medical records system. Although proofread, it may and can contain electronic and spelling errors.   Other human spelling and other errors may be present. Corrections may be executed at a later time. Please feel free to contact us for any clarifications as needed. Discussed the patient's BMI with him. The BMI follow up plan is as follows:     dietary management education, guidance, and counseling  encourage exercise  monitor weight  prescribed dietary intake    An After Visit Summary was printed and given to the patient.

## 2019-09-13 LAB
ALBUMIN SERPL-MCNC: 4.4 G/DL (ref 3.5–5.5)
ALBUMIN/GLOB SERPL: 1.3 {RATIO} (ref 1.2–2.2)
ALP SERPL-CCNC: 92 IU/L (ref 39–117)
ALT SERPL-CCNC: 41 IU/L (ref 0–44)
APPEARANCE UR: CLEAR
AST SERPL-CCNC: 47 IU/L (ref 0–40)
BACTERIA #/AREA URNS HPF: ABNORMAL /[HPF]
BASOPHILS # BLD AUTO: 0.1 X10E3/UL (ref 0–0.2)
BASOPHILS NFR BLD AUTO: 1 %
BILIRUB SERPL-MCNC: 0.5 MG/DL (ref 0–1.2)
BILIRUB UR QL STRIP: NEGATIVE
BUN SERPL-MCNC: 24 MG/DL (ref 6–20)
BUN/CREAT SERPL: 16 (ref 9–20)
CALCIUM SERPL-MCNC: 9.2 MG/DL (ref 8.7–10.2)
CASTS URNS QL MICRO: ABNORMAL /LPF
CHLORIDE SERPL-SCNC: 99 MMOL/L (ref 96–106)
CHOLEST SERPL-MCNC: 157 MG/DL (ref 100–199)
CO2 SERPL-SCNC: 21 MMOL/L (ref 20–29)
COLOR UR: YELLOW
CREAT SERPL-MCNC: 1.51 MG/DL (ref 0.76–1.27)
CRYSTALS URNS MICRO: ABNORMAL
EOSINOPHIL # BLD AUTO: 0.1 X10E3/UL (ref 0–0.4)
EOSINOPHIL NFR BLD AUTO: 2 %
EPI CELLS #/AREA URNS HPF: ABNORMAL /HPF (ref 0–10)
ERYTHROCYTE [DISTWIDTH] IN BLOOD BY AUTOMATED COUNT: 14.2 % (ref 12.3–15.4)
EST. AVERAGE GLUCOSE BLD GHB EST-MCNC: 111 MG/DL
GLOBULIN SER CALC-MCNC: 3.5 G/DL (ref 1.5–4.5)
GLUCOSE SERPL-MCNC: 98 MG/DL (ref 65–99)
GLUCOSE UR QL: NEGATIVE
HBA1C MFR BLD: 5.5 % (ref 4.8–5.6)
HCT VFR BLD AUTO: 43.6 % (ref 37.5–51)
HDLC SERPL-MCNC: 36 MG/DL
HGB BLD-MCNC: 14.6 G/DL (ref 13–17.7)
HGB UR QL STRIP: ABNORMAL
IMM GRANULOCYTES # BLD AUTO: 0 X10E3/UL (ref 0–0.1)
IMM GRANULOCYTES NFR BLD AUTO: 0 %
KETONES UR QL STRIP: NEGATIVE
LDLC SERPL CALC-MCNC: 96 MG/DL (ref 0–99)
LEUKOCYTE ESTERASE UR QL STRIP: NEGATIVE
LYMPHOCYTES # BLD AUTO: 3.2 X10E3/UL (ref 0.7–3.1)
LYMPHOCYTES NFR BLD AUTO: 40 %
MCH RBC QN AUTO: 29.6 PG (ref 26.6–33)
MCHC RBC AUTO-ENTMCNC: 33.5 G/DL (ref 31.5–35.7)
MCV RBC AUTO: 88 FL (ref 79–97)
MICRO URNS: ABNORMAL
MONOCYTES # BLD AUTO: 1.3 X10E3/UL (ref 0.1–0.9)
MONOCYTES NFR BLD AUTO: 16 %
MUCOUS THREADS URNS QL MICRO: PRESENT
NEUTROPHILS # BLD AUTO: 3.4 X10E3/UL (ref 1.4–7)
NEUTROPHILS NFR BLD AUTO: 41 %
NITRITE UR QL STRIP: NEGATIVE
PH UR STRIP: 5.5 [PH] (ref 5–7.5)
PLATELET # BLD AUTO: 336 X10E3/UL (ref 150–450)
POTASSIUM SERPL-SCNC: 3.5 MMOL/L (ref 3.5–5.2)
PROT SERPL-MCNC: 7.9 G/DL (ref 6–8.5)
PROT UR QL STRIP: ABNORMAL
RBC # BLD AUTO: 4.93 X10E6/UL (ref 4.14–5.8)
RBC #/AREA URNS HPF: ABNORMAL /HPF (ref 0–2)
SODIUM SERPL-SCNC: 138 MMOL/L (ref 134–144)
SP GR UR: >=1.03 (ref 1–1.03)
TRIGL SERPL-MCNC: 125 MG/DL (ref 0–149)
UNIDENT CRYS URNS QL MICRO: PRESENT
UROBILINOGEN UR STRIP-MCNC: 0.2 MG/DL (ref 0.2–1)
VLDLC SERPL CALC-MCNC: 25 MG/DL (ref 5–40)
WBC # BLD AUTO: 8.1 X10E3/UL (ref 3.4–10.8)
WBC #/AREA URNS HPF: ABNORMAL /HPF (ref 0–5)

## 2019-10-24 ENCOUNTER — OFFICE VISIT (OUTPATIENT)
Dept: INTERNAL MEDICINE CLINIC | Age: 39
End: 2019-10-24

## 2019-10-24 VITALS
DIASTOLIC BLOOD PRESSURE: 79 MMHG | HEIGHT: 69 IN | BODY MASS INDEX: 40.07 KG/M2 | OXYGEN SATURATION: 98 % | WEIGHT: 270.5 LBS | SYSTOLIC BLOOD PRESSURE: 123 MMHG | TEMPERATURE: 97.9 F | RESPIRATION RATE: 18 BRPM | HEART RATE: 70 BPM

## 2019-10-24 DIAGNOSIS — E66.01 OBESITY, MORBID (HCC): ICD-10-CM

## 2019-10-24 DIAGNOSIS — I10 ESSENTIAL HYPERTENSION: ICD-10-CM

## 2019-10-24 DIAGNOSIS — R10.30 LOWER ABDOMINAL PAIN: Primary | ICD-10-CM

## 2019-10-24 DIAGNOSIS — R31.21 ASYMPTOMATIC MICROSCOPIC HEMATURIA: ICD-10-CM

## 2019-10-24 DIAGNOSIS — I61.9 NONTRAUMATIC INTRACEREBRAL HEMORRHAGE, UNSPECIFIED CEREBRAL LOCATION, UNSPECIFIED LATERALITY (HCC): ICD-10-CM

## 2019-10-24 NOTE — PROGRESS NOTES
1. Have you been to the ER, urgent care clinic since your last visit? Hospitalized since your last visit? No    2. Have you seen or consulted any other health care providers outside of the [address] [place] [address] since your last visit? Include any pap smears or colon screening.  No

## 2019-10-24 NOTE — PROGRESS NOTES
SPORTS MEDICINE AND PRIMARY CARE  Jenae Trivedi MD, [address] [address]  Phone:  [phone]  Fax: [phone]       Chief Complaint   Patient presents with    Hypertension   . SUBJECTIVE:    Milka Healy is a 44 y.o. male Patient returns today after a visit last month with nausea, vomiting, abdominal pain and diarrhea, and at that time we asked for a CT scan to be done of his abdomen and pelvis. The appointment was made, but he elected not to keep the appointment for whatever reason, nor did he get the results we sent to him in letter form about repeating some studies that were abnormal.  His _____________ feels back to normal, he is not having nausea, vomiting, diarrhea. The abdominal pain has completely subsided. He has a known history of primary hypertension, ICH, obesity, and is seen for evaluation. We were concerned about his urine findings, particularly the hematuria. Current Outpatient Medications   Medication Sig Dispense Refill    omeprazole (PRILOSEC) 40 mg capsule Take 1 Cap by mouth daily. 30 Cap 3    amLODIPine (NORVASC) 5 mg tablet Take 1 Tab by mouth daily. 90 Tab 3    hydroCHLOROthiazide (HYDRODIURIL) 12.5 mg tablet Take 1 Tab by mouth daily. 90 Tab 3    metoprolol tartrate (LOPRESSOR) 50 mg tablet Take 1 Tab by mouth two (2) times a day. 180 Tab 3     Past Medical History:   Diagnosis Date    Abdominal pain 09/12/2019    Arthritis     Headache     Hematuria     Hypertension     [facility]    STM    Obesity (BMI 35.0-39.9 without comorbidity)     Plantar fasciitis, right 12/06/2018     History reviewed. No pertinent surgical history.   No Known Allergies      REVIEW OF SYSTEMS:  General: negative for - chills or fever  ENT: negative for - headaches, nasal congestion or tinnitus  Respiratory: negative for - cough, hemoptysis, shortness of breath or wheezing  Cardiovascular : negative for - chest pain, edema, palpitations or shortness of breath  Gastrointestinal: negative for - abdominal pain, blood in stools, heartburn or nausea/vomiting  Genito-Urinary: no dysuria, trouble voiding, or hematuria  Musculoskeletal: negative for - gait disturbance, joint pain, joint stiffness or joint swelling  Neurological: no TIA or stroke symptoms  Hematologic: no bruises, no bleeding, no swollen glands  Integument: no lumps, mole changes, nail changes or rash  Endocrine: no malaise/lethargy or unexpected weight changes      Social History     Socioeconomic History    Marital status: SINGLE     Spouse name: Not on file    Number of children: Not on file    Years of education: Not on file    Highest education level: Not on file   Tobacco Use    Smoking status: Never Smoker    Smokeless tobacco: Never Used   Substance and Sexual Activity    Alcohol use: No    Drug use: No    Sexual activity: Yes     Partners: Female     Birth control/protection: None   Social History Narrative    Patient is a lifetime nonsmoker, non drinker, non drug abuser.         Social History:  The patient is single, lives with his girlfriend who is also the father of his three children, 16, 12 and 2. He completed the 12th grade. He's [employer] and does [employer] work.         Family History:  Father is 54, alive and well. Mother [facility] [address] [place] [address]  with diabetes. Three sisters are alive and well. Family History   Problem Relation Age of Onset    Hypertension Mother     Heart Disease Father        OBJECTIVE:    Visit Vitals  /79   Pulse 70   Temp 97.9 °F (36.6 °C) (Oral)   Resp 18   Ht 5' 9\" (1.753 m)   Wt 270 lb 8 oz (122.7 kg)   SpO2 98%   BMI 39.95 kg/m²     CONSTITUTIONAL: well , well nourished, appears age appropriate  EYES: perrla, eom intact  ENMT:moist mucous membranes, pharynx clear  NECK: supple.  Thyroid normal  RESPIRATORY: Chest: clear bilaterally   CARDIOVASCULAR: Heart: regular rate and rhythm  GASTROINTESTINAL: Abdomen: soft, bowel sounds active  HEMATOLOGIC: no pathological lymph nodes palpated  MUSCULOSKELETAL: Extremities: no edema, pulse 1+   INTEGUMENT: No unusual rashes or suspicious skin lesions noted. Nails appear normal.  NEUROLOGIC: non-focal exam   MENTAL STATUS: alert and oriented, appropriate affect           ASSESSMENT:  1. Lower abdominal pain    2. Essential hypertension    3. Nontraumatic intracerebral hemorrhage, unspecified cerebral location, unspecified laterality ([address]    4. Obesity, morbid ([address]    5. Asymptomatic microscopic hematuria      The lower abdominal pain has completely subsided and therefore no further evaluation is indicated. Blood pressure control is at goal.    The ICH was last year and has been asymptomatic. We remain concerned about his weight. His BMI reflects obesity and since his last visit with improvement of his stomach discomfort he decided to just let it all hang out and gain another 6 pounds. We certainly encouraged him to start going the other way, to eliminate sugary drinks, decrease the junk food and not eat after 6:00 in the evening. My greatest concern, however, is the hematuria. Will repeat his urine studies. If the hematuria is persisting he will be referred to urologist.  He will be back to see us in about four months, sooner if he has any problems. I have discussed the diagnosis with the patient and the intended plan as seen in the  orders above. The patient understands and agees with the plan. The patient has   received an after visit summary and questions were answered concerning  future plans  Patient labs and/or xrays were reviewed  Past records were reviewed. PLAN:  .  Orders Placed This Encounter    CULTURE, URINE    RENAL FUNCTION PANEL    PROT+CREATU ([facility])    URINALYSIS W/ RFLX MICROSCOPIC       Follow-up and Dispositions    · Return in about 6 weeks (around 12/5/2019).                 ATTENTION:   This medical record was transcribed using an electronic medical records system. Although proofread, it may and can contain electronic and spelling errors. Other human spelling and other errors may be present. Corrections may be executed at a later time. Please feel free to contact us for any clarifications as needed.

## 2019-10-25 LAB
ALBUMIN SERPL-MCNC: 4.2 G/DL (ref 3.5–5.5)
APPEARANCE UR: CLEAR
BILIRUB UR QL STRIP: NEGATIVE
BUN SERPL-MCNC: 17 MG/DL (ref 6–20)
BUN/CREAT SERPL: 13 (ref 9–20)
CALCIUM SERPL-MCNC: 9.1 MG/DL (ref 8.7–10.2)
CHLORIDE SERPL-SCNC: 103 MMOL/L (ref 96–106)
CO2 SERPL-SCNC: 23 MMOL/L (ref 20–29)
COLOR UR: YELLOW
CREAT SERPL-MCNC: 1.29 MG/DL (ref 0.76–1.27)
CREAT UR-MCNC: 184.5 MG/DL
GLUCOSE SERPL-MCNC: 102 MG/DL (ref 65–99)
GLUCOSE UR QL: NEGATIVE
HGB UR QL STRIP: NEGATIVE
KETONES UR QL STRIP: NEGATIVE
LEUKOCYTE ESTERASE UR QL STRIP: NEGATIVE
MICRO URNS: NORMAL
NITRITE UR QL STRIP: NEGATIVE
PH UR STRIP: 5.5 [PH] (ref 5–7.5)
PHOSPHATE SERPL-MCNC: 3 MG/DL (ref 2.5–4.5)
POTASSIUM SERPL-SCNC: 4.2 MMOL/L (ref 3.5–5.2)
PROT UR QL STRIP: NORMAL
PROT UR-MCNC: 17.1 MG/DL
PROT/CREAT UR: 93 MG/G CREAT (ref 0–200)
SODIUM SERPL-SCNC: 140 MMOL/L (ref 134–144)
SP GR UR: 1.02 (ref 1–1.03)
UROBILINOGEN UR STRIP-MCNC: 0.2 MG/DL (ref 0.2–1)

## 2019-10-27 LAB — BACTERIA UR CULT: NO GROWTH

## 2020-01-19 ENCOUNTER — HOSPITAL ENCOUNTER (EMERGENCY)
Age: 40
Discharge: HOME | End: 2020-01-19
Attending: EMERGENCY MEDICINE
Payer: COMMERCIAL

## 2020-01-19 ENCOUNTER — APPOINTMENT (OUTPATIENT)
Dept: CT IMAGING | Age: 40
End: 2020-01-19
Attending: EMERGENCY MEDICINE
Payer: COMMERCIAL

## 2020-01-19 VITALS
BODY MASS INDEX: 37.03 KG/M2 | OXYGEN SATURATION: 99 % | HEIGHT: 69 IN | HEART RATE: 74 BPM | DIASTOLIC BLOOD PRESSURE: 52 MMHG | WEIGHT: 250 LBS | SYSTOLIC BLOOD PRESSURE: 137 MMHG | RESPIRATION RATE: 21 BRPM | TEMPERATURE: 98.1 F

## 2020-01-19 DIAGNOSIS — R56.9 SEIZURE (HCC): Primary | ICD-10-CM

## 2020-01-19 LAB
ALBUMIN SERPL-MCNC: 3.4 G/DL (ref 3.5–5)
ALBUMIN/GLOB SERPL: 0.8 {RATIO} (ref 1.1–2.2)
ALP SERPL-CCNC: 84 U/L (ref 45–117)
ALT SERPL-CCNC: 27 U/L (ref 12–78)
AMPHET UR QL SCN: NEGATIVE
ANION GAP SERPL CALC-SCNC: 6 MMOL/L (ref 5–15)
APPEARANCE UR: CLEAR
AST SERPL-CCNC: 24 U/L (ref 15–37)
BACTERIA URNS QL MICRO: NEGATIVE /HPF
BARBITURATES UR QL SCN: NEGATIVE
BASOPHILS # BLD: 0.1 K/UL (ref 0–0.1)
BASOPHILS NFR BLD: 1 % (ref 0–1)
BENZODIAZ UR QL: NEGATIVE
BILIRUB SERPL-MCNC: 0.4 MG/DL (ref 0.2–1)
BILIRUB UR QL: NEGATIVE
BUN SERPL-MCNC: 19 MG/DL (ref 6–20)
BUN/CREAT SERPL: 13 (ref 12–20)
CALCIUM SERPL-MCNC: 8.5 MG/DL (ref 8.5–10.1)
CANNABINOIDS UR QL SCN: NEGATIVE
CHLORIDE SERPL-SCNC: 109 MMOL/L (ref 97–108)
CO2 SERPL-SCNC: 25 MMOL/L (ref 21–32)
COCAINE UR QL SCN: NEGATIVE
COLOR UR: ABNORMAL
CREAT SERPL-MCNC: 1.44 MG/DL (ref 0.7–1.3)
DIFFERENTIAL METHOD BLD: ABNORMAL
DRUG SCRN COMMENT,DRGCM: NORMAL
EOSINOPHIL # BLD: 0.2 K/UL (ref 0–0.4)
EOSINOPHIL NFR BLD: 2 % (ref 0–7)
EPITH CASTS URNS QL MICRO: ABNORMAL /LPF
ERYTHROCYTE [DISTWIDTH] IN BLOOD BY AUTOMATED COUNT: 13.5 % (ref 11.5–14.5)
ETHANOL SERPL-MCNC: <10 MG/DL
GLOBULIN SER CALC-MCNC: 4.2 G/DL (ref 2–4)
GLUCOSE SERPL-MCNC: 99 MG/DL (ref 65–100)
GLUCOSE UR STRIP.AUTO-MCNC: NEGATIVE MG/DL
HCT VFR BLD AUTO: 40.6 % (ref 36.6–50.3)
HGB BLD-MCNC: 13.3 G/DL (ref 12.1–17)
HGB UR QL STRIP: NEGATIVE
HYALINE CASTS URNS QL MICRO: ABNORMAL /LPF (ref 0–5)
IMM GRANULOCYTES # BLD AUTO: 0 K/UL (ref 0–0.04)
IMM GRANULOCYTES NFR BLD AUTO: 0 % (ref 0–0.5)
KETONES UR QL STRIP.AUTO: NEGATIVE MG/DL
LEUKOCYTE ESTERASE UR QL STRIP.AUTO: NEGATIVE
LYMPHOCYTES # BLD: 4.3 K/UL (ref 0.8–3.5)
LYMPHOCYTES NFR BLD: 50 % (ref 12–49)
MAGNESIUM SERPL-MCNC: 2 MG/DL (ref 1.6–2.4)
MCH RBC QN AUTO: 29.8 PG (ref 26–34)
MCHC RBC AUTO-ENTMCNC: 32.8 G/DL (ref 30–36.5)
MCV RBC AUTO: 90.8 FL (ref 80–99)
METHADONE UR QL: NEGATIVE
MONOCYTES # BLD: 0.7 K/UL (ref 0–1)
MONOCYTES NFR BLD: 8 % (ref 5–13)
NEUTS SEG # BLD: 3.3 K/UL (ref 1.8–8)
NEUTS SEG NFR BLD: 39 % (ref 32–75)
NITRITE UR QL STRIP.AUTO: NEGATIVE
NRBC # BLD: 0 K/UL (ref 0–0.01)
NRBC BLD-RTO: 0 PER 100 WBC
OPIATES UR QL: NEGATIVE
PCP UR QL: NEGATIVE
PH UR STRIP: 6.5 [PH] (ref 5–8)
PLATELET # BLD AUTO: 229 K/UL (ref 150–400)
PMV BLD AUTO: 10.9 FL (ref 8.9–12.9)
POTASSIUM SERPL-SCNC: 3.6 MMOL/L (ref 3.5–5.1)
PROT SERPL-MCNC: 7.6 G/DL (ref 6.4–8.2)
PROT UR STRIP-MCNC: 100 MG/DL
RBC # BLD AUTO: 4.47 M/UL (ref 4.1–5.7)
RBC #/AREA URNS HPF: ABNORMAL /HPF (ref 0–5)
SODIUM SERPL-SCNC: 140 MMOL/L (ref 136–145)
SP GR UR REFRACTOMETRY: 1.03 (ref 1–1.03)
UROBILINOGEN UR QL STRIP.AUTO: 1 EU/DL (ref 0.2–1)
WBC # BLD AUTO: 8.5 K/UL (ref 4.1–11.1)
WBC URNS QL MICRO: ABNORMAL /HPF (ref 0–4)

## 2020-01-19 PROCEDURE — 99285 EMERGENCY DEPT VISIT HI MDM: CPT

## 2020-01-19 PROCEDURE — 81001 URINALYSIS AUTO W/SCOPE: CPT

## 2020-01-19 PROCEDURE — 74011250637 HC RX REV CODE- 250/637: Performed by: EMERGENCY MEDICINE

## 2020-01-19 PROCEDURE — 85025 COMPLETE CBC W/AUTO DIFF WBC: CPT

## 2020-01-19 PROCEDURE — 36415 COLL VENOUS BLD VENIPUNCTURE: CPT

## 2020-01-19 PROCEDURE — 83735 ASSAY OF MAGNESIUM: CPT

## 2020-01-19 PROCEDURE — 80053 COMPREHEN METABOLIC PANEL: CPT

## 2020-01-19 PROCEDURE — 80307 DRUG TEST PRSMV CHEM ANLYZR: CPT

## 2020-01-19 PROCEDURE — 70450 CT HEAD/BRAIN W/O DYE: CPT

## 2020-01-19 RX ORDER — LEVETIRACETAM 500 MG/1
500 TABLET ORAL
Status: COMPLETED | OUTPATIENT
Start: 2020-01-19 | End: 2020-01-19

## 2020-01-19 RX ORDER — LEVETIRACETAM 500 MG/1
500 TABLET ORAL 2 TIMES DAILY
Qty: 60 TAB | Refills: 0 | Status: SHIPPED | OUTPATIENT
Start: 2020-01-19 | End: 2020-02-07 | Stop reason: SDUPTHER

## 2020-01-19 RX ADMIN — LEVETIRACETAM 500 MG: 500 TABLET ORAL at 21:44

## 2020-01-20 NOTE — ED PROVIDER NOTES
EMERGENCY DEPARTMENT HISTORY AND PHYSICAL EXAM      Date: 1/19/2020  Patient Name: Kel Kim    History of Presenting Illness     Chief Complaint   Patient presents with    Seizure     witnessed seizure by wife at home lasting roughly 1 minute. EMS called to scene. pt was A&O x3. no complaints at this time       History Provided By: Patient    HPI: Kel Kim, 44 y.o. male with PMHx as noted below presents the emergency department with chief complaint of new onset seizure. Per report, just prior to arrival wife noted patient to be staring off so helped him down to the bed where he had generalized tonic-clonic seizure activity that lasted approximately 1 minute and then resolved spontaneously. He did bite his tongue during this episode and there was apparently a period of confusion following the event but is now noted to be back to his baseline. Denied any injuries during the event with exception of biting his tongue. He has no complaints at this time. Of note in 2018 the patient did have a hypertensive intracranial hemorrhage and was started on prophylactic Keppra for 2 weeks that time but never had any seizure activity so was discontinued. Denies any recent illness drug or alcohol use. PCP: Aura Moses MD    Current Outpatient Medications   Medication Sig Dispense Refill    levETIRAcetam (KEPPRA) 500 mg tablet Take 1 Tab by mouth two (2) times a day for 30 days. 60 Tab 0    omeprazole (PRILOSEC) 40 mg capsule Take 1 Cap by mouth daily. 30 Cap 3    amLODIPine (NORVASC) 5 mg tablet Take 1 Tab by mouth daily. 90 Tab 3    hydroCHLOROthiazide (HYDRODIURIL) 12.5 mg tablet Take 1 Tab by mouth daily. 90 Tab 3    metoprolol tartrate (LOPRESSOR) 50 mg tablet Take 1 Tab by mouth two (2) times a day.  180 Tab 3       Past History     Past Medical History:  Past Medical History:   Diagnosis Date    Abdominal pain 09/12/2019    Arthritis     Headache     Hematuria     Hypertension     [facility]    [facility]    Obesity (BMI 35.0-39.9 without comorbidity)     Plantar fasciitis, right 12/06/2018       Past Surgical History:  History reviewed. No pertinent surgical history. Family History:  Family History   Problem Relation Age of Onset    Hypertension Mother     Heart Disease Father        Social History:  Social History     Tobacco Use    Smoking status: Never Smoker    Smokeless tobacco: Never Used   Substance Use Topics    Alcohol use: No    Drug use: No       Allergies:  No Known Allergies      Review of Systems   Review of Systems  Constitutional: Negative for fever, chills, and fatigue. HENT: Negative for congestion, sore throat, rhinorrhea, sneezing and neck stiffness   Eyes: Negative for discharge and redness. Respiratory: Negative for  shortness of breath, wheezing   Cardiovascular: Negative for chest pain, palpitations   Gastrointestinal: Negative for nausea, vomiting, abdominal pain, constipation, diarrhea and blood in stool. Genitourinary: Negative for dysuria, hematuria, flank pain, decreased urine volume, discharge,   Musculoskeletal: Negative for myalgias or joint pain . Skin: Negative for rash or lesions . Neurological: Negative weakness, light-headedness, numbness and headaches. Physical Exam   Physical Exam    GENERAL: alert and oriented, no acute distress  EYES: PEERL, No injection, discharge or icterus. ENT: Mucous membranes pink and moist.  NECK: Supple  LUNGS: Airway patent. Non-labored respirations. Breath sounds clear with good air entry bilaterally. HEART: Regular rate and rhythm. No peripheral edema  ABDOMEN: Non-distended and non-tender, without guarding or rebound.   SKIN:  warm, dry  MSK/EXTREMITIES: Without swelling, tenderness or deformity, symmetric with normal ROM  NEUROLOGICAL:  alert and oriented x 3, CN II-XII grossly intact, strength 5/5 bilateral upper and lower extremities, sensation intact throughout to light touch, no dysmetria or ataxia noted        Diagnostic Study Results     Labs -     Recent Results (from the past 12 hour(s))   CBC WITH AUTOMATED DIFF    Collection Time: 01/19/20  8:54 PM   Result Value Ref Range    WBC 8.5 4.1 - 11.1 K/uL    RBC 4.47 4.10 - 5.70 M/uL    HGB 13.3 12.1 - 17.0 g/dL    HCT 40.6 36.6 - 50.3 %    MCV 90.8 80.0 - 99.0 FL    MCH 29.8 26.0 - 34.0 PG    MCHC 32.8 30.0 - 36.5 g/dL    RDW 13.5 11.5 - 14.5 %    PLATELET 845 679 - 784 K/uL    MPV 10.9 8.9 - 12.9 FL    NRBC 0.0 0  WBC    ABSOLUTE NRBC 0.00 0.00 - 0.01 K/uL    NEUTROPHILS 39 32 - 75 %    LYMPHOCYTES 50 (H) 12 - 49 %    MONOCYTES 8 5 - 13 %    EOSINOPHILS 2 0 - 7 %    BASOPHILS 1 0 - 1 %    IMMATURE GRANULOCYTES 0 0.0 - 0.5 %    ABS. NEUTROPHILS 3.3 1.8 - 8.0 K/UL    ABS. LYMPHOCYTES 4.3 (H) 0.8 - 3.5 K/UL    ABS. MONOCYTES 0.7 0.0 - 1.0 K/UL    ABS. EOSINOPHILS 0.2 0.0 - 0.4 K/UL    ABS. BASOPHILS 0.1 0.0 - 0.1 K/UL    ABS. IMM. GRANS. 0.0 0.00 - 0.04 K/UL    DF AUTOMATED     METABOLIC PANEL, COMPREHENSIVE    Collection Time: 01/19/20  8:54 PM   Result Value Ref Range    Sodium 140 136 - 145 mmol/L    Potassium 3.6 3.5 - 5.1 mmol/L    Chloride 109 (H) 97 - 108 mmol/L    CO2 25 21 - 32 mmol/L    Anion gap 6 5 - 15 mmol/L    Glucose 99 65 - 100 mg/dL    BUN 19 6 - 20 MG/DL    Creatinine 1.44 (H) 0.70 - 1.30 MG/DL    BUN/Creatinine ratio 13 12 - 20      GFR est AA >60 >60 ml/min/1.73m2    GFR est non-AA 55 (L) >60 ml/min/1.73m2    Calcium 8.5 8.5 - 10.1 MG/DL    Bilirubin, total 0.4 0.2 - 1.0 MG/DL    ALT (SGPT) 27 12 - 78 U/L    AST (SGOT) 24 15 - 37 U/L    Alk.  phosphatase 84 45 - 117 U/L    Protein, total 7.6 6.4 - 8.2 g/dL    Albumin 3.4 (L) 3.5 - 5.0 g/dL    Globulin 4.2 (H) 2.0 - 4.0 g/dL    A-G Ratio 0.8 (L) 1.1 - 2.2     MAGNESIUM    Collection Time: 01/19/20  8:54 PM   Result Value Ref Range    Magnesium 2.0 1.6 - 2.4 mg/dL   ETHYL ALCOHOL    Collection Time: 01/19/20  9:05 PM   Result Value Ref Range ALCOHOL(ETHYL),SERUM <10 <10 MG/DL   URINALYSIS W/ RFLX MICROSCOPIC    Collection Time: 01/19/20  9:30 PM   Result Value Ref Range    Color YELLOW/STRAW      Appearance CLEAR CLEAR      Specific gravity 1.028 1.003 - 1.030      pH (UA) 6.5 5.0 - 8.0      Protein 100 (A) NEG mg/dL    Glucose NEGATIVE  NEG mg/dL    Ketone NEGATIVE  NEG mg/dL    Bilirubin NEGATIVE  NEG      Blood NEGATIVE  NEG      Urobilinogen 1.0 0.2 - 1.0 EU/dL    Nitrites NEGATIVE  NEG      Leukocyte Esterase NEGATIVE  NEG      WBC 0-4 0 - 4 /hpf    RBC 0-5 0 - 5 /hpf    Epithelial cells FEW FEW /lpf    Bacteria NEGATIVE  NEG /hpf    Hyaline cast 2-5 0 - 5 /lpf   DRUG SCREEN, URINE    Collection Time: 01/19/20  9:30 PM   Result Value Ref Range    AMPHETAMINES NEGATIVE  NEG      BARBITURATES NEGATIVE  NEG      BENZODIAZEPINES NEGATIVE  NEG      COCAINE NEGATIVE  NEG      METHADONE NEGATIVE  NEG      OPIATES NEGATIVE  NEG      PCP(PHENCYCLIDINE) NEGATIVE  NEG      THC (TH-CANNABINOL) NEGATIVE  NEG      Drug screen comment (NOTE)        Radiologic Studies -   CT HEAD WO CONT   Final Result   IMPRESSION: No acute intracranial process. CT Results  (Last 48 hours)               01/19/20 2200  CT HEAD WO CONT Final result    Impression:  IMPRESSION: No acute intracranial process. Narrative:  EXAM: CT HEAD WO CONT   Clinical history: New onset seizure   INDICATION: seizure       COMPARISON: None. CONTRAST: None. TECHNIQUE: Unenhanced CT of the head was performed using 5 mm images. Brain and   bone windows were generated. CT dose reduction was achieved through use of a   standardized protocol tailored for this examination and automatic exposure   control for dose modulation. FINDINGS:   The ventricles and sulci are normal in size, shape and configuration and   midline. There is no significant white matter disease.  There is no intracranial   hemorrhage, extra-axial collection, mass, mass effect or midline shift.  The   basilar cisterns are open. No acute infarct is identified. The bone windows   demonstrate no abnormalities. The visualized portions of the paranasal sinuses   and mastoid air cells are clear. CXR Results  (Last 48 hours)    None            Medical Decision Making   I am the first provider for this patient. I reviewed the vital signs, available nursing notes, past medical history, past surgical history, family history and social history. Vital Signs-Reviewed the patient's vital signs. Patient Vitals for the past 12 hrs:   Temp Pulse Resp BP SpO2   01/19/20 2212  74 21  99 %   01/19/20 2211  71 25  100 %   01/19/20 2209    137/52    01/19/20 2145  75 19 124/61 99 %   01/19/20 2130  89 22 145/71 99 %   01/19/20 2115  83 27 130/61 99 %   01/19/20 2100  80 22 138/83 98 %   01/19/20 2048  92 23 146/78    01/19/20 2044 98.1 °F (36.7 °C) 89 18  100 %       Records Reviewed: Nursing Notes and Old Medical Records    Provider Notes (Medical Decision Making): On presentation, the patient is well appearing, in no acute distress with normal vital signs. considered the following etiologies to his presentation: trauma, CNS infection, hypoglycemia, hyponatremia, hypoxia, drug overdose, eoth, benzo  withdrawal, ICH, CVA, cardiac arrhythmia  I have no reason to suspect any of these potentially life threatening conditions as the cause of the patients seizure activity today. The patient has returned to their baseline mental status and is felt reliable for outpatient management. Given the history of intracranial hemorrhage does have underlying structural abnormality that may precipitate seizures so will start on Keppra at this time. .  I discussed that the patient would no longer be able to drive until cleared by their neurologist and they agree that they will not drive until seizures are under control and cleared by their neurologist.    ED Course:   Initial assessment performed.  The patients presenting problems have been discussed, and they are in agreement with the care plan formulated and outlined with them. I have encouraged them to ask questions as they arise throughout their visit. Consult: Spoke with Dr. Hall Moritz with neurology, agreed with starting patient on Keppra with outpatient neurology follow-up. PROGRESS NOTE:  The patient has been re-evaluated and is ready for discharge. Reviewed available results with patient and have counseled them on diagnosis and care plan. They have expressed understanding, and all their questions have been answered. They agree with plan and agree to have pt F/U as recommended, or return to the ED if their sxs worsen. Discharge instructions have been provided and explained to them, along with reasons to have pt return to the ED. The patient is amenable to discharge so will discharge pt at this time      Disposition:  home    PLAN:  1. Discharge Medication List as of 1/19/2020 10:09 PM      START taking these medications    Details   levETIRAcetam (KEPPRA) 500 mg tablet Take 1 Tab by mouth two (2) times a day for 30 days. , Print, Disp-60 Tab, R-0         CONTINUE these medications which have NOT CHANGED    Details   omeprazole (PRILOSEC) 40 mg capsule Take 1 Cap by mouth daily. , Normal, Disp-30 Cap, R-3      amLODIPine (NORVASC) 5 mg tablet Take 1 Tab by mouth daily. , Normal, Disp-90 Tab, R-3      hydroCHLOROthiazide (HYDRODIURIL) 12.5 mg tablet Take 1 Tab by mouth daily. , Normal, Disp-90 Tab, R-3      metoprolol tartrate (LOPRESSOR) 50 mg tablet Take 1 Tab by mouth two (2) times a day., Normal, Disp-180 Tab, R-3           2.    Follow-up Information     Follow up With Specialties Details Why Contact Info    Thera Favre, MD Internal Medicine Schedule an appointment as soon as possible for a visit in 2 days  [address]  [place] [address] [phone]      [facility] EMERGENCY DEPT Emergency Medicine  If symptoms worsen [id number] [facility] [address] [facility] [address]  [phone]    Juan Diego Cunningham, DO Neurology Schedule an appointment as soon as possible for a visit in 2 days  [address] [place] [address]  [phone]          Return to ED if worse     Diagnosis     Clinical Impression:   1. Seizure [facility])        Please note that this dictation was completed with Dragon, computer voice recognition software. Quite often unanticipated grammatical, syntax, homophones, and other interpretive errors are inadvertently transcribed by the computer software. Please disregard these errors. Additionally, please excuse any errors that have escaped final proofreading.

## 2020-01-20 NOTE — DISCHARGE INSTRUCTIONS
Patient Education        Seizure: Care Instructions  Your Care Instructions    Seizures are caused by abnormal patterns of electrical signals in the brain. They are different for each person. Seizures can affect movement, speech, vision, or awareness. Some people have only slight shaking of a hand and do not pass out. Other people may pass out and have violent shaking of the whole body. Some people appear to stare into space. They are awake, but they can't respond normally. Later, they may not remember what happened. You may need tests to identify the type and cause of the seizures. A seizure may occur only once, or you may have them more than one time. Taking medicines as directed and following up with your doctor may help keep you from having more seizures. The doctor has checked you carefully, but problems can develop later. If you notice any problems or new symptoms, get medical treatment right away. Follow-up care is a key part of your treatment and safety. Be sure to make and go to all appointments, and call your doctor if you are having problems. It's also a good idea to know your test results and keep a list of the medicines you take. How can you care for yourself at home? · Be safe with medicines. Take your medicines exactly as prescribed. Call your doctor if you think you are having a problem with your medicine. · Do not do any activity that could be dangerous to you or others until your doctor says it is safe to do so. For example, do not drive a car, operate machinery, swim, or climb ladders. · Be sure that anyone treating you for any health problem knows that you have had a seizure and what medicines you are taking for it. · Identify and avoid things that may make you more likely to have a seizure. These may include lack of sleep, alcohol or drug use, stress, or not eating. · Make sure you go to your follow-up appointment. When should you call for help?   Call 911 anytime you think you may need emergency care. For example, call if:    · You have another seizure.     · You have more than one seizure in 24 hours.     · You have new symptoms, such as trouble walking, speaking, or thinking clearly.    Call your doctor now or seek immediate medical care if:    · You are not acting normally.    Watch closely for changes in your health, and be sure to contact your doctor if you have any problems. Where can you learn more? Go to http://shanique-milo.info/. Enter B287 in the search box to learn more about \"Seizure: Care Instructions. \"  Current as of: March 28, 2019  Content Version: 12.2  © 3956-0553 [employer]. Care instructions adapted under license by [employer] (which disclaims liability or warranty for this information). If you have questions about a medical condition or this instruction, always ask your healthcare professional. [address] any warranty or liability for your use of this information.

## 2020-02-07 ENCOUNTER — OFFICE VISIT (OUTPATIENT)
Dept: NEUROLOGY | Age: 40
End: 2020-02-07

## 2020-02-07 VITALS
OXYGEN SATURATION: 97 % | RESPIRATION RATE: 18 BRPM | HEIGHT: 69 IN | BODY MASS INDEX: 40.14 KG/M2 | SYSTOLIC BLOOD PRESSURE: 132 MMHG | DIASTOLIC BLOOD PRESSURE: 84 MMHG | WEIGHT: 271 LBS | HEART RATE: 60 BPM

## 2020-02-07 DIAGNOSIS — I61.9 LEFT-SIDED NONTRAUMATIC INTRACEREBRAL HEMORRHAGE, UNSPECIFIED CEREBRAL LOCATION (HCC): ICD-10-CM

## 2020-02-07 DIAGNOSIS — R56.9 WITNESSED SEIZURE-LIKE ACTIVITY (HCC): Primary | ICD-10-CM

## 2020-02-07 RX ORDER — LEVETIRACETAM 500 MG/1
500 TABLET ORAL 2 TIMES DAILY
Qty: 180 TAB | Refills: 1 | Status: SHIPPED | OUTPATIENT
Start: 2020-02-07 | End: 2020-03-08

## 2020-02-07 NOTE — PROGRESS NOTES
Chief Complaint   Patient presents with    Seizure     had seizure about 2 wks ago, first one, pt's wife states he had gone in to room to ask question, she noticed he looked off, face started twitching, she walked him in to bedroom, pt started convulsing, she laid him down on floor. lasting about 1-2 min. postictal pt was in a very deep sleep, snoring.   [id number]

## 2020-02-07 NOTE — PATIENT INSTRUCTIONS
Please refrain from driving, operating heavy machinery, bathing alone, showering with the door locked, unsupervised swimming, climbing higher than 10 feet or engaging in any other activity that may cause harm to oneself or others in the event of sudden loss of consciousness. For family members who may witness seizure activity at home, please stay calm and attempt to time the event if possible. If the seizure lasts greater than 5 minutes, 911 should be called. Take note of specific movements as they occur and whether or not the patient is responsive so a good description can be given to the paramedics and physicians. Do not place anything in the patient's mouth. If possible, gently lay them on their side. Allow the seizure to continue without restraining the patient. First Aid for a grand mal seizure:   -Remain calm and do not panic, call for assistance if needed.   -Lower the person safely to the ground and loosen any tight clothing.   -Place the person in a side-lying position so any saliva or vomit will easily drain out of the mouth. Actively seizing people are at a increased risk of choking on their saliva or vomit. Do not put any objects such as a tongue depressor or fingers into the mouth. Protect the persons head from injury while they are on their side.   -Time the seizure from start to finish so you know how long it lasted (most grand mal seizures are no more than 1 or 2 minutes long). If the seizure is continuing longer than 5 minutes, call the ambulance at 911 for transportation to the nearest Emergency Room. -After a grand mal seizure, people are very sleepy and tired for several minutes or even a couple of hours. They may also complain of headache, nausea and may vomit. Please remember:   Call your doctor if you feel that the severity or number of seizures has changed from baseline. If you have several grand mal seizures without waking up in-between, please notify your doctor.

## 2020-02-07 NOTE — PROGRESS NOTES
Neurology Clinic Follow up Note    Patient ID:  Indira Farrell  [id number]  48 y.o.  1980      Mr. Ama Duran is here for follow up today of [address]       Last Appointment With Me:  8/7/18    \"39 y. o.right handed male presenting for evaluation of ICH. Pt reports severe new onset headache 2 days prior to presentation located over the frontal region b/l, L retro-orbital.  No associated N/V. No seizure activity reported. He denied exposure to antiplatelet or [facility] No preceding trauma. At presentation to [facility] ED his BP was 165/118mg. No focal weakness, numbness/paresthesias, aphasia (baseline stuttering reported), dysarthria. Head CT 7/22/18 reviewed L temporal ICH with mild associated edema. Evaluated by NSGY with non-surgical management. Subsequent MRI/A without evidence of underlying mass, ischemia or vascular malformation/aneurysm. \"  Interval History:   Patient returns after greater than 1 year due to breakthrough seizure activity 1/19/2020. His wife noted patient was staring off followed by GTC seizure lasting approximately 1 minute with spontaneous resolution. +Tongue biting and confusion following the event. Repeat head CT did not reveal any acute intracranial process. LEV was resumed at 500mg BID. No further events on LEV. He reports some dizziness/lightheadedness on occasion while eating, uncertain if this is related to medication. No irritability. PMHx/ PSHx/ FHx/ SHx:  Reviewed and unchanged previous visit. Past Medical History:   Diagnosis Date    Abdominal pain 09/12/2019    Arthritis     Headache     Hematuria     Hypertension     ICH (intracerebral hemorrhage) ([facility] 07/22/2018    STM    Obesity (BMI 35.0-39.9 without comorbidity)     Plantar fasciitis, right 12/06/2018         ROS:  Comprehensive review of systems negative except for as noted above.        Objective:       Meds:  Current Outpatient Medications   Medication Sig Dispense Refill  levETIRAcetam (KEPPRA) 500 mg tablet Take 1 Tab by mouth two (2) times a day for 30 days. 60 Tab 0    omeprazole (PRILOSEC) 40 mg capsule Take 1 Cap by mouth daily. 30 Cap 3    amLODIPine (NORVASC) 5 mg tablet Take 1 Tab by mouth daily. 90 Tab 3    hydroCHLOROthiazide (HYDRODIURIL) 12.5 mg tablet Take 1 Tab by mouth daily. 90 Tab 3    metoprolol tartrate (LOPRESSOR) 50 mg tablet Take 1 Tab by mouth two (2) times a day. 180 Tab 3       Exam:  Visit Vitals  /84 (BP 1 Location: Right arm, BP Patient Position: Sitting)   Pulse 60   Resp 18   Ht 5' 9\" (1.753 m)   Wt 122.9 kg (271 lb)   SpO2 97%   BMI 40.02 kg/m²     NEUROLOGICAL EXAM:  General: Awake, alert, occasional stuttering speech. CN: PERRL, EOMI without nystagmus, VFF to confrontation, facial sensation and strength are normal and symmetric, hearing is intact to finger rub bilaterally, palate and tongue movements are intact and symmetric. Motor: Normal tone, bulk and strength bilaterally. Reflexes: 2/4 and symmetric, plantar stimulation is flexor. Coordination: FNF, LAUREN, HTS intact. Sensation: LT intact throughout. Gait: Normal-based and steady. LABS  Results for orders placed or performed during the hospital encounter of 01/19/20   CBC WITH AUTOMATED DIFF   Result Value Ref Range    WBC 8.5 4.1 - 11.1 K/uL    RBC 4.47 4.10 - 5.70 M/uL    HGB 13.3 12.1 - 17.0 g/dL    HCT 40.6 36.6 - 50.3 %    MCV 90.8 80.0 - 99.0 FL    MCH 29.8 26.0 - 34.0 PG    MCHC 32.8 30.0 - 36.5 g/dL    RDW 13.5 11.5 - 14.5 %    PLATELET 534 295 - 923 K/uL    MPV 10.9 8.9 - 12.9 FL    NRBC 0.0 0  WBC    ABSOLUTE NRBC 0.00 0.00 - 0.01 K/uL    NEUTROPHILS 39 32 - 75 %    LYMPHOCYTES 50 (H) 12 - 49 %    MONOCYTES 8 5 - 13 %    EOSINOPHILS 2 0 - 7 %    BASOPHILS 1 0 - 1 %    IMMATURE GRANULOCYTES 0 0.0 - 0.5 %    ABS. NEUTROPHILS 3.3 1.8 - 8.0 K/UL    ABS. LYMPHOCYTES 4.3 (H) 0.8 - 3.5 K/UL    ABS. MONOCYTES 0.7 0.0 - 1.0 K/UL    ABS.  EOSINOPHILS 0.2 0.0 - 0.4 K/UL    ABS. BASOPHILS 0.1 0.0 - 0.1 K/UL    ABS. IMM. GRANS. 0.0 0.00 - 0.04 K/UL    DF AUTOMATED     METABOLIC PANEL, COMPREHENSIVE   Result Value Ref Range    Sodium 140 136 - 145 mmol/L    Potassium 3.6 3.5 - 5.1 mmol/L    Chloride 109 (H) 97 - 108 mmol/L    CO2 25 21 - 32 mmol/L    Anion gap 6 5 - 15 mmol/L    Glucose 99 65 - 100 mg/dL    BUN 19 6 - 20 MG/DL    Creatinine 1.44 (H) 0.70 - 1.30 MG/DL    BUN/Creatinine ratio 13 12 - 20      GFR est AA >60 >60 ml/min/1.73m2    GFR est non-AA 55 (L) >60 ml/min/1.73m2    Calcium 8.5 8.5 - 10.1 MG/DL    Bilirubin, total 0.4 0.2 - 1.0 MG/DL    ALT (SGPT) 27 12 - 78 U/L    AST (SGOT) 24 15 - 37 U/L    Alk.  phosphatase 84 45 - 117 U/L    Protein, total 7.6 6.4 - 8.2 g/dL    Albumin 3.4 (L) 3.5 - 5.0 g/dL    Globulin 4.2 (H) 2.0 - 4.0 g/dL    A-G Ratio 0.8 (L) 1.1 - 2.2     URINALYSIS W/ RFLX MICROSCOPIC   Result Value Ref Range    Color YELLOW/STRAW      Appearance CLEAR CLEAR      Specific gravity 1.028 1.003 - 1.030      pH (UA) 6.5 5.0 - 8.0      Protein 100 (A) NEG mg/dL    Glucose NEGATIVE  NEG mg/dL    Ketone NEGATIVE  NEG mg/dL    Bilirubin NEGATIVE  NEG      Blood NEGATIVE  NEG      Urobilinogen 1.0 0.2 - 1.0 EU/dL    Nitrites NEGATIVE  NEG      Leukocyte Esterase NEGATIVE  NEG      WBC 0-4 0 - 4 /hpf    RBC 0-5 0 - 5 /hpf    Epithelial cells FEW FEW /lpf    Bacteria NEGATIVE  NEG /hpf    Hyaline cast 2-5 0 - 5 /lpf   ETHYL ALCOHOL   Result Value Ref Range    ALCOHOL(ETHYL),SERUM <10 <10 MG/DL   DRUG SCREEN, URINE   Result Value Ref Range    AMPHETAMINES NEGATIVE  NEG      BARBITURATES NEGATIVE  NEG      BENZODIAZEPINES NEGATIVE  NEG      COCAINE NEGATIVE  NEG      METHADONE NEGATIVE  NEG      OPIATES NEGATIVE  NEG      PCP(PHENCYCLIDINE) NEGATIVE  NEG      THC (TH-CANNABINOL) NEGATIVE  NEG      Drug screen comment (NOTE)    MAGNESIUM   Result Value Ref Range    Magnesium 2.0 1.6 - 2.4 mg/dL       IMAGING:  MRI Results (most recent):  [place] [facility] [place]   MRA BRAIN WO CONT    Narrative EXAM:  MRI BRAIN W WO CONT, MRA BRAIN WO CONT    INDICATION: Headache. Left cerebral intracranial intra-axial hematoma on CT. Hypertension. COMPARISON: CT head on 7/22/2018. TECHNIQUE: Multisequence, multiplanar MRI of the brain before and following  uneventful intravenous administration of gadolinium 20 mL Dotarem. Noncontrast  time of flight MR angiography of the head. Multiplanar maximum intensity  projection reformats. (2 separate studies reported together)    FINDINGS: MRI brain: Hyperintense T1, hypointense T2 intra-axial hematoma in the  posterior left temporal lobe measures 4.4 x 2.7 x 1.8 cm, unchanged by my  measurements. Estimated volume is 10.7 mL. There is surrounding vasogenic edema, similar to the CT. No associated  restricted diffusion. No underlying mass or vascular malformation. There is no hydrocephalus. There is no midline shift. No extra-axial fluid  collection. No pathologic enhancement. No restricted diffusion to indicate acute  infarct. The midline structures, including the cervicomedullary junction, are  within normal limits. MRA head: The vertebral arteries are codominant. The basilar artery and its  branches are normal. The internal carotid, anterior cerebral, and middle  cerebral arteries are patent. There is no flow-limiting intracranial stenosis. There is no aneurysm. There are no sizable posterior communicating arteries. Impression IMPRESSION:  1. Unchanged 10.7 mL intra-axial subacute hematoma in the posterior left  temporal lobe. Unchanged surrounding vasogenic edema. No evidence of underlying  infarct, mass, or vascular malformation. 2. Normal MR angiography of the head.        CT Results (most recent):  Results from Hospital Encounter encounter on 01/19/20   CT HEAD WO CONT    Narrative EXAM: CT HEAD WO CONT  Clinical history: New onset seizure  INDICATION: seizure    COMPARISON: None.    CONTRAST: None. TECHNIQUE: Unenhanced CT of the head was performed using 5 mm images. Brain and  bone windows were generated. CT dose reduction was achieved through use of a  standardized protocol tailored for this examination and automatic exposure  control for dose modulation. FINDINGS:  The ventricles and sulci are normal in size, shape and configuration and  midline. There is no significant white matter disease. There is no intracranial  hemorrhage, extra-axial collection, mass, mass effect or midline shift. The  basilar cisterns are open. No acute infarct is identified. The bone windows  demonstrate no abnormalities. The visualized portions of the paranasal sinuses  and mastoid air cells are clear. Impression IMPRESSION: No acute intracranial process. Assessment:     Encounter Diagnoses     ICD-10-CM ICD-9-CM   1. Witnessed seizure-like activity (HCC) R56.9 780.39   2. Left-sided nontraumatic intracerebral hemorrhage, unspecified cerebral location [facility]) [id number]   44year old AAM here for f/u, previously seen during admission for hypertensive L temporal ICH 7/22/18. MRI/A without evidence of underlying mass, ischemia or vascular malformation/aneurysm. He returns due to new onset GTC seizure activity witnessed by family 1/19/2020. Head CT was repeated at that time without acute intracranial process. LEV was initiated by ED staff. He has done well on current AEDs without further seizure activity. We discussed that he will likely need to continue with AEDs indefinitely given recent events. We discussed seizure precautions today. Will obtain baseline EEG to assess for interictal epileptiform activity. Plan:   Continue LEV 500mg BID  Routine EEG  Seizure precautions discussed    Follow-up and Dispositions    · Return in about 3 months (around 5/7/2020).            Signed:  Rick William DO  2/7/2020  9:48 AM

## 2020-02-14 ENCOUNTER — OFFICE VISIT (OUTPATIENT)
Dept: INTERNAL MEDICINE CLINIC | Age: 40
End: 2020-02-14

## 2020-02-14 VITALS
RESPIRATION RATE: 18 BRPM | BODY MASS INDEX: 39.52 KG/M2 | WEIGHT: 266.8 LBS | DIASTOLIC BLOOD PRESSURE: 69 MMHG | TEMPERATURE: 98 F | OXYGEN SATURATION: 98 % | HEIGHT: 69 IN | HEART RATE: 62 BPM | SYSTOLIC BLOOD PRESSURE: 104 MMHG

## 2020-02-14 DIAGNOSIS — I10 ESSENTIAL HYPERTENSION: ICD-10-CM

## 2020-02-14 DIAGNOSIS — I61.9 NONTRAUMATIC INTRACEREBRAL HEMORRHAGE, UNSPECIFIED CEREBRAL LOCATION, UNSPECIFIED LATERALITY (HCC): ICD-10-CM

## 2020-02-14 DIAGNOSIS — E66.01 OBESITY, MORBID (HCC): ICD-10-CM

## 2020-02-14 DIAGNOSIS — G40.909 SEIZURE DISORDER (HCC): Primary | ICD-10-CM

## 2020-02-14 DIAGNOSIS — M19.90 ARTHRITIS: ICD-10-CM

## 2020-02-14 NOTE — PROGRESS NOTES
1. Have you been to the ER, urgent care clinic since your last visit? Hospitalized since your last visit? Yes When: 1- Where: [facility] Reason for visit: seizure    2. Have you seen or consulted any other health care providers outside of the [address] [place] [address] since your last visit? Include any pap smears or colon screening.  No

## 2020-02-14 NOTE — LETTER
NOTIFICATION RETURN TO WORK / SCHOOL 
 
2/14/2020 1:35 PM 
 
Mr. Sandra Schultz [address] [id number] To Whom It May Concern: 
 
Sandra Schultz is currently under the care of [address] [place] [address] 02/14/2020 to 02/16/2020 He will return to work/school on:02/17/2020 If there are questions or concerns please have the patient contact our office. Sincerely, Quinton Wolf MD

## 2020-02-14 NOTE — PROGRESS NOTES
SPORTS MEDICINE AND PR[facility]ARE  Sayra Rollins MD, [address] [address]  Phone:  [phone]  Fax: [phone]       Chief Complaint   Patient presents with    Hypertension   . SUBJECTIVE:    Raquel Boston is a 44 y.o. male Patient returns today with his wife and his daughter. On 01/19 he was in the ER for evaluation of a seizure. Just prior to arrival his wife noted that he was staring off. She was starting to help him get in bed when he experienced generalized tonic clonic seizure activity that lasted approximately a minute and then resolved spontaneously. He did bite his tongue during the episode and there was apparently an episode of confusion following the event, but by the time he arrived in the ER he was back to baseline. He was subsequently seen by his neurologist on 02/07/20. She mentioned that on 07/22/18 he had a left temporal ICH with mild associated edema. He was evaluated by [facility] with nonsurgical management recommended. She subsequently saw him for the seizure with his wife and noted that he had no further events after placed on Levaquin. She recommended that he continue the AEDs indefinitely and suggested EEG. He has not scheduled EEG as of yet. Other medication problems include morbid obesity, primary hypertension, arthritis, and is seen for evaluation. Current Outpatient Medications   Medication Sig Dispense Refill    levETIRAcetam (KEPPRA) 500 mg tablet Take 1 Tab by mouth two (2) times a day for 30 days. 180 Tab 1    amLODIPine (NORVASC) 5 mg tablet Take 1 Tab by mouth daily. 90 Tab 3    hydroCHLOROthiazide (HYDRODIURIL) 12.5 mg tablet Take 1 Tab by mouth daily. 90 Tab 3    metoprolol tartrate (LOPRESSOR) 50 mg tablet Take 1 Tab by mouth two (2) times a day.  180 Tab 3     Past Medical History:   Diagnosis Date    Abdominal pain 09/12/2019    Arthritis     Headache     Hematuria     Hypertension     ICH (intracerebral hemorrhage) ([facility] 07/22/2018    STM    Obesity (BMI 35.0-39.9 without comorbidity)     Plantar fasciitis, right 12/06/2018    Seizure disorder ([facility] 01/19/2020     History reviewed. No pertinent surgical history. No Known Allergies      REVIEW OF SYSTEMS:  General: negative for - chills or fever  ENT: negative for - headaches, nasal congestion or tinnitus  Respiratory: negative for - cough, hemoptysis, shortness of breath or wheezing  Cardiovascular : negative for - chest pain, edema, palpitations or shortness of breath  Gastrointestinal: negative for - abdominal pain, blood in stools, heartburn or nausea/vomiting  Genito-Urinary: no dysuria, trouble voiding, or hematuria  Musculoskeletal: negative for - gait disturbance, joint pain, joint stiffness or joint swelling  Neurological: no TIA or stroke symptoms  Hematologic: no bruises, no bleeding, no swollen glands  Integument: no lumps, mole changes, nail changes or rash  Endocrine: no malaise/lethargy or unexpected weight changes      Social History     Socioeconomic History    Marital status: SINGLE     Spouse name: Not on file    Number of children: Not on file    Years of education: Not on file    Highest education level: Not on file   Tobacco Use    Smoking status: Never Smoker    Smokeless tobacco: Never Used   Substance and Sexual Activity    Alcohol use: No    Drug use: No    Sexual activity: Yes     Partners: Female     Birth control/protection: None   Social History Narrative    Patient is a lifetime nonsmoker, non drinker, non drug abuser.         Social History:  The patient is single, lives with his girlfriend who is also the father of his three children, 16, 12 and 2. He completed the 12th grade. He's J[employer] and does long-term work.         Family History:  Father is 54, alive and well. Mother 01 - [address] [facility]  with diabetes. Three sisters are alive and well.      Family History   Problem Relation Age of Onset    Hypertension Mother    [facility] Heart Disease Father        OBJECTIVE:    Visit Vitals  /69   Pulse 62   Temp 98 °F (36.7 °C) (Oral)   Resp 18   Ht 5' 9\" (1.753 m)   Wt 266 lb 12.8 oz (121 kg)   SpO2 98%   BMI 39.40 kg/m²   So he has had no further seizures since the episode on the 19th. He is taking the Keppra on a regular basis. He is also back to work in his position as a . Morbid obesity remains an issue and since we last saw him he has lost 5 pounds in weight and we congratulate him. No new neurologic events. Blood pressure control is at goal, no adjustments are needed. Arthritic pains are not particularly bothersome. He will be back to see us in about three to four months, sooner if needed. CONSTITUTIONAL: well , well nourished, appears age appropriate  EYES: perrla, eom intact  ENMT:moist mucous membranes, pharynx clear  NECK: supple. Thyroid normal  RESPIRATORY: Chest: clear bilaterally   CARDIOVASCULAR: Heart: regular rate and rhythm  GASTROINTESTINAL: Abdomen: soft, bowel sounds active  HEMATOLOGIC: no pathological lymph nodes palpated  MUSCULOSKELETAL: Extremities: no edema, pulse 1+   INTEGUMENT: No unusual rashes or suspicious skin lesions noted. Nails appear normal.  NEUROLOGIC: non-focal exam   MENTAL STATUS: alert and oriented, appropriate affect           ASSESSMENT:  1. Seizure disorder ([address]    2. Obesity, morbid ([address]    3. Nontraumatic intracerebral hemorrhage, unspecified cerebral location, unspecified laterality ([address]    4. Essential hypertension    5. Arthritis        I have discussed the diagnosis with the patient and the intended plan as seen in the  orders above. The patient understands and agees with the plan. The patient has   received an after visit summary and questions were answered concerning  future plans  Patient labs and/or xrays were reviewed  Past records were reviewed. PLAN:  . No orders of the defined types were placed in this encounter.       Follow-up and Dispositions    · Return in about 4 months (around 6/14/2020). ATTENTION:   This medical record was transcribed using an electronic medical records system. Although proofread, it may and can contain electronic and spelling errors. Other human spelling and other errors may be present. Corrections may be executed at a later time. Please feel free to contact us for any clarifications as needed.

## 2020-02-28 ENCOUNTER — HOSPITAL ENCOUNTER (OUTPATIENT)
Dept: NEUROLOGY | Age: 40
Discharge: HOME | End: 2020-02-28
Attending: PSYCHIATRY & NEUROLOGY
Payer: COMMERCIAL

## 2020-02-28 DIAGNOSIS — R56.9 WITNESSED SEIZURE-LIKE ACTIVITY (HCC): ICD-10-CM

## 2020-02-28 PROCEDURE — 95816 EEG AWAKE AND DROWSY: CPT

## 2020-02-28 NOTE — PROCEDURES
EEG REPORT    Patient Name: Maria Eugenia Ordonez  : 1980  Age: 44 y.o. Ordering physician: Kandace Libman  Date of EE2020  9:23-9:47  Diagnosis: seizure  Interpreting physician: Matt Goins D.O. FAAN    Procedure: EEG    CLINICAL INDICATION: The patient is a 44 y.o. male who is being evaluated for baseline electro cerebral activities and to rule out seizure focus. Current Outpatient Medications   Medication Sig    levETIRAcetam (KEPPRA) 500 mg tablet Take 1 Tab by mouth two (2) times a day for 30 days.  amLODIPine (NORVASC) 5 mg tablet Take 1 Tab by mouth daily.  hydroCHLOROthiazide (HYDRODIURIL) 12.5 mg tablet Take 1 Tab by mouth daily.  metoprolol tartrate (LOPRESSOR) 50 mg tablet Take 1 Tab by mouth two (2) times a day. No current facility-administered medications for this encounter. DESCRIPTION OF PROCEDURE:     This is a digitally recorded electroencephalogram  Electrodes were applied in accordance with the international 10-20 system of electrode placement. 18 channels of scalp EEG are recorded  A channel was used for EoG  Another channel was used for ECG   The data is stored digitally and reviewed in reformatted montages for optimal display  EEG  was reviewed in both bipolar and referential montages    Description of Activity: The background of this recording contains a posteriorly-located occipital alpha rhythm of 9-10 hz that attenuates with eye opening. This was seen maximally over the posterior head region and was symmetric. Throughout the recording, there were no clear areas of focal slowing nor spike or spike-and-wave discharges seen. Hyperventilation was performed producing minimal impact to the recording. Photic stimulation produced a driving response in the posterior head regions at mid frequencies. During drowsiness, there is attenuation of the underlying  frequency and slower theta activity occurs.      During the recording, the patient did not achieve stage II sleep        Clinical Interpretation: This EEG, performed during wakefulness and drowsiness is normal.  There was no clear focal abnormalities or epileptiform activity. A normal EEG doesn't not rule out seizures. Clinical correlation recommended. PAULINO Alamo Safer

## 2020-05-14 RX ORDER — METOPROLOL TARTRATE 50 MG/1
TABLET ORAL
Qty: 180 TAB | Refills: 3 | Status: SHIPPED | OUTPATIENT
Start: 2020-05-14 | End: 2021-05-09

## 2020-06-19 ENCOUNTER — OFFICE VISIT (OUTPATIENT)
Dept: INTERNAL MEDICINE CLINIC | Age: 40
End: 2020-06-19

## 2020-06-19 VITALS
SYSTOLIC BLOOD PRESSURE: 112 MMHG | HEART RATE: 69 BPM | DIASTOLIC BLOOD PRESSURE: 73 MMHG | RESPIRATION RATE: 16 BRPM | BODY MASS INDEX: 41.92 KG/M2 | WEIGHT: 283 LBS | HEIGHT: 69 IN | TEMPERATURE: 98.5 F | OXYGEN SATURATION: 97 %

## 2020-06-19 DIAGNOSIS — I10 ESSENTIAL HYPERTENSION: Primary | ICD-10-CM

## 2020-06-19 DIAGNOSIS — I62.9 INTRACRANIAL BLEED (HCC): ICD-10-CM

## 2020-06-19 DIAGNOSIS — I61.9 NONTRAUMATIC INTRACEREBRAL HEMORRHAGE, UNSPECIFIED CEREBRAL LOCATION, UNSPECIFIED LATERALITY (HCC): ICD-10-CM

## 2020-06-19 DIAGNOSIS — E66.01 OBESITY, MORBID (HCC): ICD-10-CM

## 2020-06-19 DIAGNOSIS — G40.909 SEIZURE DISORDER (HCC): ICD-10-CM

## 2020-06-19 DIAGNOSIS — E66.9 OBESITY (BMI 35.0-39.9 WITHOUT COMORBIDITY): ICD-10-CM

## 2020-06-19 RX ORDER — LEVETIRACETAM 500 MG/1
500 TABLET ORAL 2 TIMES DAILY
Qty: 60 TAB | Refills: 11
Start: 2020-06-19 | End: 2021-07-22 | Stop reason: SDUPTHER

## 2020-06-19 NOTE — ASSESSMENT & PLAN NOTE
This condition is managed by Specialist.   Key Neurological Meds     The patient is on no neurologic meds.         Lab Results   Component Value Date/Time    WBC 8.5 01/19/2020 08:54 PM    HGB 13.3 01/19/2020 08:54 PM    HCT 40.6 01/19/2020 08:54 PM    PLATELET 416 32/70/8929 08:54 PM    Creatinine 1.44 01/19/2020 08:54 PM    BUN 19 01/19/2020 08:54 PM

## 2020-06-19 NOTE — PROGRESS NOTES
SPORTS MEDICINE AND PRIMARY CARE  Jenny York MD, [address] [address]  Phone:  [phone]  Fax: [phone]       Chief Complaint   Patient presents with    Hypertension   . SUBJECTIVE:    Nicole Phoenix is a 36 y.o. male Since we last saw him, he was seen by Dr. Stephen Alvarez on 02/07/20 and at that time he reported severe new onset headaches two days prior to the visit, over the frontal region bilaterally and retro-orbitally. He was assessed in the emergency room at [facility], where blood pressure was noted to be elevated, no focal weaknesses, and CT revealed on 07/22/18 a left temporal ICH with mild associated edema. He was evaluated by [facility] with nonsurgical management. Subsequent MRI and MRA without any evidence of underlying masses, ischemia, vascular malformation or aneurysm. He was felt to have breakthrough seizure activity on 01/19/20 as his wife noted he was staring off, a DTC seizure, lasted approximately one minute with spontaneous resolution, plus tongue biting and confusion that followed the event. CT scan did not reveal any acute intracranial process and Keppra was resumed at 500 mg b.i.d. He had no further events. He may have had one about a month ago, he states, when his wife noted that he was transiently staring, his face looked like he had passed out. He did not seek medical attention that time. He had taken Keppra on a regular basis and comes in today for follow up. Current Outpatient Medications   Medication Sig Dispense Refill    levETIRAcetam (KEPPRA) 500 mg tablet Take 1 Tab by mouth two (2) times a day. 60 Tab 11    metoprolol tartrate (LOPRESSOR) 50 mg tablet TAKE 1 TABLET TWICE A  Tab 3    amLODIPine (NORVASC) 5 mg tablet Take 1 Tab by mouth daily. 90 Tab 3    hydroCHLOROthiazide (HYDRODIURIL) 12.5 mg tablet Take 1 Tab by mouth daily.  80 Tab 3     Past Medical History:   Diagnosis Date    Abdominal pain 09/12/2019    Arthritis     Headache     Hematuria     Hypertension     [facility] 07/22/2018    STM    Obesity (BMI 35.0-39.9 without comorbidity)     Plantar fasciitis, right 12/06/2018    Seizure disorder ([facility] 01/19/2020    Lion alas     History reviewed. No pertinent surgical history. No Known Allergies      REVIEW OF SYSTEMS:  General: negative for - chills or fever  ENT: negative for - headaches, nasal congestion or tinnitus  Respiratory: negative for - cough, hemoptysis, shortness of breath or wheezing  Cardiovascular : negative for - chest pain, edema, palpitations or shortness of breath  Gastrointestinal: negative for - abdominal pain, blood in stools, heartburn or nausea/vomiting  Genito-Urinary: no dysuria, trouble voiding, or hematuria  Musculoskeletal: negative for - gait disturbance, joint pain, joint stiffness or joint swelling  Neurological: no TIA or stroke symptoms  Hematologic: no bruises, no bleeding, no swollen glands  Integument: no lumps, mole changes, nail changes or rash  Endocrine: no malaise/lethargy or unexpected weight changes      Social History     Socioeconomic History    Marital status: SINGLE     Spouse name: Not on file    Number of children: Not on file    Years of education: Not on file    Highest education level: Not on file   Tobacco Use    Smoking status: Never Smoker    Smokeless tobacco: Never Used   Substance and Sexual Activity    Alcohol use: No    Drug use: No    Sexual activity: Yes     Partners: Female     Birth control/protection: None   Social History Narrative    Patient is a lifetime nonsmoker, non drinker, non drug abuser.         Social History:  The patient is single, lives with his girlfriend who is also the father of his three children, 16, 12 and 2. He completed the 12th grade. He's [employer] and does senior care work [employer]         Family History:  Father is 54, alive and well.  Mother 62 - JERZY  with diabetes. Three sisters are alive and well. Family History   Problem Relation Age of Onset    Hypertension Mother     Heart Disease Father        OBJECTIVE:    Visit Vitals  /73   Pulse 69   Temp 98.5 °F (36.9 °C) (Oral)   Resp 16   Ht 5' 9\" (1.753 m)   Wt 283 lb (128.4 kg)   SpO2 97%   BMI 41.79 kg/m²     CONSTITUTIONAL: well , well nourished, appears age appropriate  EYES: perrla, eom intact  ENMT:moist mucous membranes, pharynx clear  NECK: supple. Thyroid normal  RESPIRATORY: Chest: clear bilaterally   CARDIOVASCULAR: Heart: regular rate and rhythm  GASTROINTESTINAL: Abdomen: soft, bowel sounds active  HEMATOLOGIC: no pathological lymph nodes palpated  MUSCULOSKELETAL: Extremities: no edema, pulse 1+   INTEGUMENT: No unusual rashes or suspicious skin lesions noted. Nails appear normal.  NEUROLOGIC: non-focal exam   MENTAL STATUS: alert and oriented, appropriate affect           ASSESSMENT:  1. Essential hypertension    2. Seizure disorder ([address]    3. Nontraumatic intracerebral hemorrhage, unspecified cerebral location, unspecified laterality ([address]    4. Obesity (BMI 35.0-39.9 without comorbidity)    5. Intracranial bleed (HCC)      Patient's blood pressure control is at goal on Metoprolol twice a day and Amlodipine 5 mg daily and Hydrochlorothiazide 12.5 mg daily. It sounds as if he had another seizure and I ask him to call Dr. Elvira Christopher today and speak to her or her nurse and tell her about the procedure in case she wants to make an adjustment in the [address]. As described above, he had an intracerebral hemorrhage, which has been treated conservatively, and that may have been the origin of the seizure. We speak with him again about his weight.  he is steadily gaining weight and we suggest he begin cutting back on his caloric intake and be physically active for 30 minutes, five days a week. Fortunately, he is able to work. 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     He will be back to see us in about four months, sooner if he has any problems. He is going to get in touch, however, with the neurologist.  He has not had a PSA checked and since he is 36years old we will check his PSA to rule out prostate cancer. I have discussed the diagnosis with the patient and the intended plan as seen in the  orders above. The patient understands and agees with the plan. The patient has   received an after visit summary and questions were answered concerning  future plans  Patient labs and/or xrays were reviewed  Past records were reviewed. PLAN:  .  Orders Placed This Encounter    PROSTATE SPECIFIC AG    levETIRAcetam (KEPPRA) 500 mg tablet       Follow-up and Dispositions    · Return in about 4 months (around 10/19/2020). ATTENTION:   This medical record was transcribed using an electronic medical records system. Although proofread, it may and can contain electronic and spelling errors. Other human spelling and other errors may be present. Corrections may be executed at a later time. Please feel free to contact us for any clarifications as needed.

## 2020-06-19 NOTE — PROGRESS NOTES
1. Have you been to the ER, urgent care clinic since your last visit? Hospitalized since your last visit? No    2. Have you seen or consulted any other health care providers outside of the [address] [place] [address] since your last visit? Include any pap smears or colon screening.  No     No issues

## 2020-06-20 LAB — PSA SERPL-MCNC: 1.5 NG/ML (ref 0–4)

## 2020-06-23 ENCOUNTER — TELEPHONE (OUTPATIENT)
Dept: NEUROLOGY | Age: 40
End: 2020-06-23

## 2020-06-23 NOTE — TELEPHONE ENCOUNTER
Pt's wife calling stating that about 3 weeks ago the pt was eating and he started chocking. She said he then passed out. She said it happened out of nowhere and is concerned he may have had another seizure.  Please call back

## 2020-06-25 NOTE — TELEPHONE ENCOUNTER
Spoke with mother, noted with possible choking 3 weeks ago and passed out. Didn't seek ED eval. Patient was passed out less than a minute. He woke up and felt better-didn't feel like he needed to go to ED. No further episodes since then. Mother states patient has a follow up on 7/1 and can go into further detail then.

## 2020-07-01 ENCOUNTER — VIRTUAL VISIT (OUTPATIENT)
Dept: NEUROLOGY | Age: 40
End: 2020-07-01

## 2020-07-01 DIAGNOSIS — R56.9 WITNESSED SEIZURE-LIKE ACTIVITY (HCC): Primary | ICD-10-CM

## 2020-07-01 DIAGNOSIS — R55 SYNCOPE, UNSPECIFIED SYNCOPE TYPE: ICD-10-CM

## 2020-07-01 DIAGNOSIS — I61.9 LEFT-SIDED NONTRAUMATIC INTRACEREBRAL HEMORRHAGE, UNSPECIFIED CEREBRAL LOCATION (HCC): ICD-10-CM

## 2020-07-01 DIAGNOSIS — I10 HYPERTENSION, UNSPECIFIED TYPE: ICD-10-CM

## 2020-07-01 NOTE — PROGRESS NOTES
Neurology Clinic Follow up Note    Patient ID:  Caty Lowe  [id number]  28 y.o.  1980      Mr. Kris Reyna is here for follow up today of [address]     This is a telemedicine visit that was performed with the originating site at [facility] and the distant site at patient's home. Verbal consent to participate in video visit was obtained. The patient was identified by name and date of birth. This visit occurred during the Coronavirus [phone]) [facility] Emergency. I discussed with the patient the nature of our telemedicine visits, that:     I would evaluate the patient and recommend diagnostics and treatments based on my assessment   Our sessions are not being recorded and that personal health information is protected   Our team would provide follow up care in person if/when the patient needs it    Last Appointment With Me:  2/7/20    \"40 y. o.right handed male presenting for evaluation of ICH. Pt reports severe new onset headache 2 days prior to presentation located over the frontal region b/l, L retro-orbital.  No associated N/V. No seizure activity reported. He denied exposure to antiplatelet or [facility]. No preceding trauma. At presentation to [facility] ED his BP was 165/118mg. No focal weakness, numbness/paresthesias, aphasia (baseline stuttering reported), dysarthria. Head CT 7/22/18 reviewed L temporal ICH with mild associated edema. Evaluated by NSGY with non-surgical management. Subsequent MRI/A without evidence of underlying mass, ischemia or vascular malformation/aneurysm. \"  Interval History:   Patient returns for f/u of ICH/seizure activity. Denies new focal weakness, numbness, recurrent ICH. He reports a possible breakthrough seizure 1 month prior. Patient states he was sitting down to eat and started choking on his food with subsequent LOC. No associated shaking/seizure-like activity. His eyes rolled backwards.   He regained consciousness after 10 seconds. No tongue biting, urinary incontinence. No confusion/fatigue following the episode. He is still taking LEV for seizure ppx. No side effects on medication. He reports recent episodes x 2 months occurring every 2 weeks of occasional dizziness/lightheadeness/pre-syncope. This is non-positional.  No reported CP/palpitations, N/V, diaphoresis during events. No prior cardiac evaluations. PMHx/ PSHx/ FHx/ SHx:  Reviewed and unchanged previous visit. Past Medical History:   Diagnosis Date    Abdominal pain 09/12/2019    Arthritis     Headache     Hematuria     Hypertension     [facility] 07/22/2018    STM    Obesity (BMI 35.0-39.9 without comorbidity)     Plantar fasciitis, right 12/06/2018    Seizure disorder ([facility] 01/19/2020    Lion alas         ROS:  Comprehensive review of systems negative except for as noted above. Objective:       Meds:  Current Outpatient Medications   Medication Sig Dispense Refill    levETIRAcetam (KEPPRA) 500 mg tablet Take 1 Tab by mouth two (2) times a day. 60 Tab 11    metoprolol tartrate (LOPRESSOR) 50 mg tablet TAKE 1 TABLET TWICE A  Tab 3    amLODIPine (NORVASC) 5 mg tablet Take 1 Tab by mouth daily. 90 Tab 3    hydroCHLOROthiazide (HYDRODIURIL) 12.5 mg tablet Take 1 Tab by mouth daily. 90 Tab 3       Exam:  Due to this being a TeleHealth evaluation, many elements of the physical examination are unable to be assessed. Patient-Reported Vitals 7/1/2020   Patient-Reported Weight 283lb   Patient-Reported Height 5'9   Patient-Reported Pulse 63   Patient-Reported Temperature (No Data)   Patient-Reported SpO2 (No Data)   Patient-Reported Systolic  (No Data)   Patient-Reported Diastolic (No Data)   NEUROLOGICAL EXAM:  General: Awake, alert, occasional stuttering.    CN: EOMI, VF intact, facial strength/sensation normal and symmetric, hearing is intact  Motor: AG x 4  Reflexes: deferred  Coordination: No ataxia. Sensation: LT intact throughout  Gait: Steady    LABS  Results for orders placed or performed in visit on 06/19/20   PSA, DIAGNOSTIC (PROSTATE SPECIFIC AG)   Result Value Ref Range    Prostate Specific Ag 1.5 0.0 - 4.0 ng/mL       IMAGING:  MRI Results (most recent):  Results from Hospital Encounter encounter on 07/22/18   MRA BRAIN WO CONT    Narrative EXAM:  MRI BRAIN W WO CONT, MRA BRAIN WO CONT    INDICATION: Headache. Left cerebral intracranial intra-axial hematoma on CT. Hypertension. COMPARISON: CT head on 7/22/2018. TECHNIQUE: Multisequence, multiplanar MRI of the brain before and following  uneventful intravenous administration of gadolinium 20 mL Dotarem. Noncontrast  time of flight MR angiography of the head. Multiplanar maximum intensity  projection reformats. (2 separate studies reported together)    FINDINGS: MRI brain: Hyperintense T1, hypointense T2 intra-axial hematoma in the  posterior left temporal lobe measures 4.4 x 2.7 x 1.8 cm, unchanged by my  measurements. Estimated volume is 10.7 mL. There is surrounding vasogenic edema, similar to the CT. No associated  restricted diffusion. No underlying mass or vascular malformation. There is no hydrocephalus. There is no midline shift. No extra-axial fluid  collection. No pathologic enhancement. No restricted diffusion to indicate acute  infarct. The midline structures, including the cervicomedullary junction, are  within normal limits. MRA head: The vertebral arteries are codominant. The basilar artery and its  branches are normal. The internal carotid, anterior cerebral, and middle  cerebral arteries are patent. There is no flow-limiting intracranial stenosis. There is no aneurysm. There are no sizable posterior communicating arteries. Impression IMPRESSION:  1. Unchanged 10.7 mL intra-axial subacute hematoma in the posterior left  temporal lobe. Unchanged surrounding vasogenic edema.  No evidence of underlying  infarct, mass, or vascular malformation. 2. Normal MR angiography of the head. CT Results (most recent):  Results from Hospital Encounter encounter on 01/19/20   CT HEAD WO CONT    Narrative EXAM: CT HEAD WO CONT  Clinical history: New onset seizure  INDICATION: seizure    COMPARISON: None. CONTRAST: None. TECHNIQUE: Unenhanced CT of the head was performed using 5 mm images. Brain and  bone windows were generated. CT dose reduction was achieved through use of a  standardized protocol tailored for this examination and automatic exposure  control for dose modulation. FINDINGS:  The ventricles and sulci are normal in size, shape and configuration and  midline. There is no significant white matter disease. There is no intracranial  hemorrhage, extra-axial collection, mass, mass effect or midline shift. The  basilar cisterns are open. No acute infarct is identified. The bone windows  demonstrate no abnormalities. The visualized portions of the paranasal sinuses  and mastoid air cells are clear. Impression IMPRESSION: No acute intracranial process. Assessment:     Encounter Diagnoses     ICD-10-CM ICD-9-CM   1. Witnessed seizure-like activity (HCC) R56.9 780.39   2. Syncope, unspecified syncope type R55 780.2   3. Left-sided nontraumatic intracerebral hemorrhage, unspecified cerebral location (HCC) I61.9 431   4. Hypertension, unspecified type I10 36.9      36year old AAM here for f/u, previously seen during admission for hypertensive L temporal ICH 7/22/18. MRI/A without evidence of underlying mass, ischemia or vascular malformation/aneurysm. Last seen 2/2020 with reported seizure-like activity 1/19/20 (GTC), subsequently maintained on LEV for seizure prophylaxis. EEG was completed and normal.    Head CT was repeated at that time without acute intracranial process.   Will obtain prolonged EEG monitoring to further assess for ictal activity given recurrent syncopal episode last month, although event does not sound characteristic for seizure activity. Advise cardiology evaluation as well due to more recent episodes x 2 months involving dizziness/LH/pre-syncope. Plan:   Continue LEV 500mg BID for now  24h EEG monitoring  Syncope precautions  Cardiology evaluation for non-positional dizziness/pre-syncope       Follow-up and Dispositions    · Return in about 2 months (around 9/1/2020).        Signed:  Mahnaz Baumann,   7/1/2020  9:48 AM

## 2020-07-07 ENCOUNTER — TELEPHONE (OUTPATIENT)
Dept: CARDIOLOGY CLINIC | Age: 40
End: 2020-07-07

## 2020-07-07 NOTE — TELEPHONE ENCOUNTER
Left a voice message on 7/7/2020 requesting a return call to schedule an appt with Dr. Kirk Rodriguez due to referral from Dr. Lalo Griggs.

## 2020-07-08 NOTE — TELEPHONE ENCOUNTER
Left another voice message on 7/8/2020 requesting a return call to schedule an appt with Dr. Josué Silvestre due to referral from Dr. Chloé Herzog.

## 2020-07-15 NOTE — TELEPHONE ENCOUNTER
Left 3x voice messages on 7/8/2020 requesting a return call to schedule an appt with Dr. Kia Mcgee due to referral from Dr. Nicki Espinal.

## 2020-07-22 RX ORDER — AMLODIPINE BESYLATE 5 MG/1
TABLET ORAL
Qty: 90 TAB | Refills: 3 | Status: SHIPPED | OUTPATIENT
Start: 2020-07-22 | End: 2021-07-17

## 2020-07-22 RX ORDER — HYDROCHLOROTHIAZIDE 12.5 MG/1
TABLET ORAL
Qty: 90 TAB | Refills: 3 | Status: SHIPPED | OUTPATIENT
Start: 2020-07-22 | End: 2021-07-17

## 2020-07-23 RX ORDER — LEVETIRACETAM 500 MG/1
TABLET ORAL
Qty: 180 TAB | Refills: 3 | OUTPATIENT
Start: 2020-07-23

## 2020-07-28 NOTE — TELEPHONE ENCOUNTER
Spoke with pharmacist. [address] order from 6/19 was not received. Provided verbal order to [facility] per 's order. Family aware.

## 2020-09-08 ENCOUNTER — TELEPHONE (OUTPATIENT)
Dept: NEUROLOGY | Facility: CLINIC | Age: 40
End: 2020-09-08

## 2020-09-08 NOTE — TELEPHONE ENCOUNTER
Left message for patient that he needs to have 24h EEG done first before coming in to see . Provided contact information for EEG. To call back after to schedule follow up.

## 2020-10-16 ENCOUNTER — OFFICE VISIT (OUTPATIENT)
Dept: INTERNAL MEDICINE CLINIC | Age: 40
End: 2020-10-16
Payer: COMMERCIAL

## 2020-10-16 VITALS
DIASTOLIC BLOOD PRESSURE: 74 MMHG | HEART RATE: 60 BPM | HEIGHT: 69 IN | SYSTOLIC BLOOD PRESSURE: 119 MMHG | TEMPERATURE: 98.4 F | WEIGHT: 291.2 LBS | OXYGEN SATURATION: 95 % | RESPIRATION RATE: 16 BRPM | BODY MASS INDEX: 43.13 KG/M2

## 2020-10-16 DIAGNOSIS — G40.909 SEIZURE DISORDER (HCC): ICD-10-CM

## 2020-10-16 DIAGNOSIS — I62.9 INTRACRANIAL BLEED (HCC): ICD-10-CM

## 2020-10-16 DIAGNOSIS — I61.9 NONTRAUMATIC INTRACEREBRAL HEMORRHAGE, UNSPECIFIED CEREBRAL LOCATION, UNSPECIFIED LATERALITY (HCC): ICD-10-CM

## 2020-10-16 DIAGNOSIS — I10 ESSENTIAL HYPERTENSION: Primary | ICD-10-CM

## 2020-10-16 DIAGNOSIS — E66.9 OBESITY (BMI 35.0-39.9 WITHOUT COMORBIDITY): ICD-10-CM

## 2020-10-16 PROCEDURE — 99213 OFFICE O/P EST LOW 20 MIN: CPT | Performed by: INTERNAL MEDICINE

## 2020-10-16 NOTE — PROGRESS NOTES
SPORTS MEDICINE AND PRIMARY CARE  Alina Gooden MD, [address] [address]  Phone:  [phone]  Fax: [phone]       Chief Complaint   Patient presents with    Hypertension     4 month follow up    . SUBJECTIVE:    Sharyle Dudley is a 36 y.o. male Patient returns today complaining of bilateral trapezius muscle tenderness, particularly on the right. His wife wonders if it is related to the way he is sleeping. Other new complaints denied. He has a known history of intracerebral bleed, primary hypertension, obesity, seizure disorder, and is seen for evaluation. Fortunately, he has had no further seizures. He is taking his medications on a regular basis and is able to work. In spite of COVID virus, he was not laid off. Current Outpatient Medications   Medication Sig Dispense Refill    hydroCHLOROthiazide (HYDRODIURIL) 12.5 mg tablet TAKE 1 TABLET DAILY 90 Tab 3    amLODIPine (NORVASC) 5 mg tablet TAKE 1 TABLET DAILY 90 Tab 3    levETIRAcetam (KEPPRA) 500 mg tablet Take 1 Tab by mouth two (2) times a day. 60 Tab 11    metoprolol tartrate (LOPRESSOR) 50 mg tablet TAKE 1 TABLET TWICE A  Tab 3     Past Medical History:   Diagnosis Date    Abdominal pain 09/12/2019    Arthritis     Headache     Hematuria     Hypertension     [facility]) ([facility] 07/22/2018    STM    Obesity (BMI 35.0-39.9 without comorbidity)     Plantar fasciitis, right 12/06/2018    [facility] 01/19/2020    Lion alas     History reviewed. No pertinent surgical history.   No Known Allergies      REVIEW OF SYSTEMS:  General: negative for - chills or fever  ENT: negative for - headaches, nasal congestion or tinnitus  Respiratory: negative for - cough, hemoptysis, shortness of breath or wheezing  Cardiovascular : negative for - chest pain, edema, palpitations or shortness of breath  Gastrointestinal: negative for - abdominal pain, blood in stools, heartburn or nausea/vomiting  Genito-Urinary: no dysuria, trouble voiding, or hematuria  Musculoskeletal: negative for - gait disturbance, joint pain, joint stiffness or joint swelling  Neurological: no TIA or stroke symptoms  Hematologic: no bruises, no bleeding, no swollen glands  Integument: no lumps, mole changes, nail changes or rash  Endocrine: no malaise/lethargy or unexpected weight changes      Social History     Socioeconomic History    Marital status: SINGLE     Spouse name: Not on file    Number of children: Not on file    Years of education: Not on file    Highest education level: Not on file   Tobacco Use    Smoking status: Never Smoker    Smokeless tobacco: Never Used   Substance and Sexual Activity    Alcohol use: No    Drug use: No    Sexual activity: Yes     Partners: Female     Birth control/protection: None   Social History Narrative    Patient is a lifetime nonsmoker, non drinker, non drug abuser.         Social History:  The patient is single, lives with his girlfriend who is also the father of his three children, 16, 12 and 2. He completed the 12th grade. He's [employer] and does [employer] work [employer]         Family History:  Father is 54, alive and well. Mother 68 - [address] [facility]  with diabetes. Three sisters are alive and well. Family History   Problem Relation Age of Onset    Hypertension Mother     Heart Disease Father        OBJECTIVE:    Visit Vitals  /74   Pulse 60   Temp 98.4 °F (36.9 °C) (Oral)   Resp 16   Ht 5' 9\" (1.753 m)   Wt 291 lb 3.2 oz (132.1 kg)   SpO2 95%   BMI 43.00 kg/m²     CONSTITUTIONAL: well , well nourished, appears age appropriate  EYES: perrla, eom intact  ENMT:moist mucous membranes, pharynx clear  NECK: supple.  Thyroid normal  RESPIRATORY: Chest: clear bilaterally   CARDIOVASCULAR: Heart: regular rate and rhythm  GASTROINTESTINAL: Abdomen: soft, bowel sounds active  HEMATOLOGIC: no pathological lymph nodes palpated  MUSCULOSKELETAL: Extremities: no edema, pulse 1+   INTEGUMENT: No unusual rashes or suspicious skin lesions noted. Nails appear normal.  NEUROLOGIC: non-focal exam   MENTAL STATUS: alert and oriented, appropriate affect           ASSESSMENT:  1. Essential hypertension    2. Intracranial bleed ([address]    3. Nontraumatic intracerebral hemorrhage, unspecified cerebral location, unspecified laterality ([address]    4. Obesity (BMI 35.0-39.9 without comorbidity)    5. Seizure disorder [facility])      Patient states he is doing well. He works with Rupali Garcia, one of our other patients, both are really good guys. Blood pressure control is excellent. No seizures, which is very fortunate for him. The primary issue is related to obesity, which continues to increase. We encourage a heart healthy, weight reducing diet and physical activity 30 minutes five days a week. He will be back to see us in four to six months, sooner if he has any problems. I have discussed the diagnosis with the patient and the intended plan as seen in the  Orders. The patient understands and agees with the plan. The patient has   received an after visit summary and questions were answered concerning  future plans  Patient labs and/or xrays were reviewed  Past records were reviewed. PLAN:  . No orders of the defined types were placed in this encounter. ATTENTION:   This medical record was transcribed using an electronic medical records system. Although proofread, it may and can contain electronic and spelling errors. Other human spelling and other errors may be present. Corrections may be executed at a later time. Please feel free to contact us for any clarifications as needed.

## 2020-10-16 NOTE — ASSESSMENT & PLAN NOTE
This condition is managed by Specialist.   [facility] Neu[facility] Meds             levETIRAcetam (KEPPRA) 500 mg tablet (Taking) Take 1 Tab by mouth two (2) times a day.         Lab Results   Component Value Date/Time    WBC 8.5 01/19/2020 08:54 PM    HGB 13.3 01/19/2020 08:54 PM    HCT 40.6 01/19/2020 08:54 PM    PLATELET 643 40/45/1582 08:54 PM    Creatinine 1.44 01/19/2020 08:54 PM    BUN 19 01/19/2020 08:54 PM

## 2020-10-16 NOTE — PROGRESS NOTES
Chief Complaint   Patient presents with    Hypertension     4 month follow up        1. Have you been to the ER, urgent care clinic since your last visit? Hospitalized since your last visit? No    2. Have you seen or consulted any other health care providers outside of the [address] [place] [address] since your last visit? Include any pap smears or colon screening.  No

## 2021-02-03 ENCOUNTER — TELEPHONE (OUTPATIENT)
Dept: NEUROLOGY | Age: 41
End: 2021-02-03

## 2021-02-03 NOTE — TELEPHONE ENCOUNTER
----- Message from Alfredo Inch Page sent at 2/3/2021  3:55 PM EST -----  Regarding: Dr. Sarah Cerda telephone  Patient return call    Caller's first and last name and relationship (if not the patient):   [facility] contact number(s): [phone]          Details to clarify the request:  Call was just missed from the  office      Ellen Phelps

## 2021-02-03 NOTE — TELEPHONE ENCOUNTER
Pt's wife calling in regards to covid vaccine, stated pt is getting it in the morning and wanted to check with Dr Awad to make sure its okay due to his condition. Please call.

## 2021-03-19 ENCOUNTER — OFFICE VISIT (OUTPATIENT)
Dept: INTERNAL MEDICINE CLINIC | Age: 41
End: 2021-03-19
Payer: COMMERCIAL

## 2021-03-19 VITALS
BODY MASS INDEX: 42.51 KG/M2 | SYSTOLIC BLOOD PRESSURE: 128 MMHG | RESPIRATION RATE: 16 BRPM | OXYGEN SATURATION: 98 % | HEART RATE: 63 BPM | HEIGHT: 69 IN | WEIGHT: 287 LBS | DIASTOLIC BLOOD PRESSURE: 84 MMHG | TEMPERATURE: 97.9 F

## 2021-03-19 DIAGNOSIS — M19.90 ARTHRITIS: ICD-10-CM

## 2021-03-19 DIAGNOSIS — E66.01 OBESITY, MORBID (HCC): ICD-10-CM

## 2021-03-19 DIAGNOSIS — I62.9 INTRACRANIAL BLEED (HCC): ICD-10-CM

## 2021-03-19 DIAGNOSIS — I61.9 NONTRAUMATIC INTRACEREBRAL HEMORRHAGE, UNSPECIFIED CEREBRAL LOCATION, UNSPECIFIED LATERALITY (HCC): ICD-10-CM

## 2021-03-19 DIAGNOSIS — I10 ESSENTIAL HYPERTENSION: Primary | ICD-10-CM

## 2021-03-19 DIAGNOSIS — G40.909 SEIZURE DISORDER (HCC): ICD-10-CM

## 2021-03-19 PROCEDURE — 99214 OFFICE O/P EST MOD 30 MIN: CPT | Performed by: INTERNAL MEDICINE

## 2021-03-19 RX ORDER — SILDENAFIL 100 MG/1
100 TABLET, FILM COATED ORAL AS NEEDED
Qty: 20 TAB | Refills: 11 | Status: SHIPPED | OUTPATIENT
Start: 2021-03-19 | End: 2021-06-24 | Stop reason: SDUPTHER

## 2021-03-19 NOTE — PROGRESS NOTES
1. Have you been to the ER, urgent care clinic since your last visit? Hospitalized since your last visit? No    2. Have you seen or consulted any other health care providers outside of the [address] [place] [address] since your last visit? Include any pap smears or colon screening.  No

## 2021-03-19 NOTE — PROGRESS NOTES
SPORTS MEDICINE AND PRIMARY CARE  Netta Ace MD, [address] [address]  Phone:  [phone]  Fax: [phone]       Chief Complaint   Patient presents with    Hypertension   . SUBJECTIVE:    Minus Schaumann is a 36 y.o. male Patient returns today with a known history of primary hypertension, seizure disorder, morbid obesity, intracerebral hemorrhage, and is seen for evaluation. Patient states he is having a hard time maintaining an erection. He has an erection, can penetrate, but then he loses his erection. He does not consummate his sex act. He is also complaining of pain in his right testicle. Other new complaints denied and patient is seen for evaluation. Current Outpatient Medications   Medication Sig Dispense Refill    sildenafil citrate (VIAGRA) 100 mg tablet Take 1 Tab by mouth as needed for Erectile Dysfunction. 20 Tab 11    hydroCHLOROthiazide (HYDRODIURIL) 12.5 mg tablet TAKE 1 TABLET DAILY 90 Tab 3    amLODIPine (NORVASC) 5 mg tablet TAKE 1 TABLET DAILY 90 Tab 3    levETIRAcetam (KEPPRA) 500 mg tablet Take 1 Tab by mouth two (2) times a day. 60 Tab 11    metoprolol tartrate (LOPRESSOR) 50 mg tablet TAKE 1 TABLET TWICE A  Tab 3     Past Medical History:   Diagnosis Date    Abdominal pain 09/12/2019    Arthritis     CKD (chronic kidney disease), stage III     Headache     Hematuria     Hypertension     ICH [facility]intracerebral hemorrhage) ([facility] 07/22/2018    STM    Obesity (BMI 35.0-39.9 without comorbidity)     Plantar fasciitis, right 12/06/2018    [facility] 01/19/2020    Lion alas     History reviewed. No pertinent surgical history.   No Known Allergies      REVIEW OF SYSTEMS:  General: negative for - chills or fever  ENT: negative for - headaches, nasal congestion or tinnitus  Respiratory: negative for - cough, hemoptysis, shortness of breath or wheezing  Cardiovascular : negative for - chest pain, edema, palpitations or shortness of breath  Gastrointestinal: negative for - abdominal pain, blood in stools, heartburn or nausea/vomiting  Genito-Urinary: no dysuria, trouble voiding, or hematuria  Musculoskeletal: negative for - gait disturbance, joint pain, joint stiffness or joint swelling  Neurological: no TIA or stroke symptoms  Hematologic: no bruises, no bleeding, no swollen glands  Integument: no lumps, mole changes, nail changes or rash  Endocrine: no malaise/lethargy or unexpected weight changes      Social History     Socioeconomic History    Marital status: SINGLE     Spouse name: Not on file    Number of children: Not on file    Years of education: Not on file    Highest education level: Not on file   Tobacco Use    Smoking status: Never Smoker    Smokeless tobacco: Never Used   Substance and Sexual Activity    Alcohol use: No    Drug use: No    Sexual activity: Yes     Partners: Female     Birth control/protection: None   Social History Narrative    Patient is a lifetime nonsmoker, non drinker, non drug abuser.         Social History:  The patient is single, lives with his girlfriend who is also the father of his three children, 16, 12 and 2. He completed the 12th grade. 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 Mother 62 - [address] [place] [address]  with diabetes. Three sisters are alive and well. Family History   Problem Relation Age of Onset    Hypertension Mother     Heart Disease Father        OBJECTIVE:    Visit Vitals  /84   Pulse 63   Temp 97.9 °F (36.6 °C) (Oral)   Resp 16   Ht 5' 9\" (1.753 m)   Wt 287 lb (130.2 kg)   SpO2 98%   BMI 42.38 kg/m²     CONSTITUTIONAL: well , well nourished, appears age appropriate  EYES: perrla, eom intact  ENMT:moist mucous membranes, pharynx clear  NECK: supple.  Thyroid normal  RESPIRATORY: Chest: clear bilaterally   CARDIOVASCULAR: Heart: regular rate and rhythm  GASTROINTESTINAL: Abdomen: soft, bowel sounds active  HEMATOLOGIC: no pathological lymph nodes palpated  MUSCULOSKELETAL: Extremities: no edema, pulse 1+   INTEGUMENT: No unusual rashes or suspicious skin lesions noted. Nails appear normal.  NEUROLOGIC: non-focal exam   MENTAL STATUS: alert and oriented, appropriate affect           ASSESSMENT:  1. Essential hypertension    2. Seizure disorder ([address]    3. Obesity, morbid ([address]    4. Nontraumatic intracerebral hemorrhage, unspecified cerebral location, unspecified laterality ([address]    5. Intracranial bleed ([address]    6. Arthritis      Patient's blood pressure control is at goal.      He has had no seizures since we last saw him. He has morbid obesity, for which he has lost 4 pounds and we encourage him to continue his weight loss program.      He has had nontraumatic intracerebral hemorrhage, intracranial bleed, which is the reason it is important that we keep his blood pressures close to 120/80 or less. He also has arthritic pains, for which he is currently asymptomatic. Examination f the testicles is completely unremarkable. He has no hernia and is uncircumcised. There is no tenderness. I suspect the discomfort he had may have been related to minor trauma that has now resolved. He will return to the office in four months, sooner if he has any problems. We will check his cholesterol to confirm it is in a goal range. I have discussed the diagnosis with the patient and the intended plan as seen in the  Orders. The patient understands and agees with the plan. The patient has   received an after visit summary and questions were answered concerning  future plans  Patient labs and/or xrays were reviewed  Past records were reviewed. PLAN:  .  Orders Placed This Encounter    LIPID PANEL    RENAL FUNCTION PANEL    sildenafil citrate (VIAGRA) 100 mg tablet       Follow-up and Dispositions    · Return in about 4 months (around 7/19/2021).                 ATTENTION:   This medical record was transcribed using an electronic medical records system. Although proofread, it may and can contain electronic and spelling errors. Other human spelling and other errors may be present. Corrections may be executed at a later time. Please feel free to contact us for any clarifications as needed.

## 2021-03-20 LAB
ALBUMIN SERPL-MCNC: 4.5 G/DL (ref 4–5)
BUN SERPL-MCNC: 20 MG/DL (ref 6–24)
BUN/CREAT SERPL: 16 (ref 9–20)
CALCIUM SERPL-MCNC: 9.4 MG/DL (ref 8.7–10.2)
CHLORIDE SERPL-SCNC: 102 MMOL/L (ref 96–106)
CHOLEST SERPL-MCNC: 205 MG/DL (ref 100–199)
CO2 SERPL-SCNC: 24 MMOL/L (ref 20–29)
CREAT SERPL-MCNC: 1.25 MG/DL (ref 0.76–1.27)
GLUCOSE SERPL-MCNC: 92 MG/DL (ref 65–99)
HDLC SERPL-MCNC: 46 MG/DL
LDLC SERPL CALC-MCNC: 146 MG/DL (ref 0–99)
PHOSPHATE SERPL-MCNC: 2.8 MG/DL (ref 2.8–4.1)
POTASSIUM SERPL-SCNC: 4.3 MMOL/L (ref 3.5–5.2)
SODIUM SERPL-SCNC: 142 MMOL/L (ref 134–144)
TRIGL SERPL-MCNC: 73 MG/DL (ref 0–149)
VLDLC SERPL CALC-MCNC: 13 MG/DL (ref 5–40)

## 2021-03-21 RX ORDER — ROSUVASTATIN CALCIUM 10 MG/1
10 TABLET, COATED ORAL
Qty: 90 TAB | Refills: 3 | Status: SHIPPED | OUTPATIENT
Start: 2021-03-21 | End: 2022-02-28

## 2021-05-09 RX ORDER — METOPROLOL TARTRATE 50 MG/1
TABLET ORAL
Qty: 180 TAB | Refills: 3 | Status: SHIPPED | OUTPATIENT
Start: 2021-05-09 | End: 2022-02-15

## 2021-06-24 ENCOUNTER — OFFICE VISIT (OUTPATIENT)
Dept: INTERNAL MEDICINE CLINIC | Age: 41
End: 2021-06-24
Payer: COMMERCIAL

## 2021-06-24 VITALS
TEMPERATURE: 98 F | BODY MASS INDEX: 42.14 KG/M2 | HEART RATE: 69 BPM | RESPIRATION RATE: 18 BRPM | HEIGHT: 69 IN | WEIGHT: 284.5 LBS | DIASTOLIC BLOOD PRESSURE: 84 MMHG | SYSTOLIC BLOOD PRESSURE: 133 MMHG | OXYGEN SATURATION: 97 %

## 2021-06-24 DIAGNOSIS — G40.909 SEIZURE DISORDER (HCC): ICD-10-CM

## 2021-06-24 DIAGNOSIS — M19.90 ARTHRITIS: ICD-10-CM

## 2021-06-24 DIAGNOSIS — E78.5 DYSLIPIDEMIA: ICD-10-CM

## 2021-06-24 DIAGNOSIS — I62.9 INTRACRANIAL BLEED (HCC): ICD-10-CM

## 2021-06-24 DIAGNOSIS — I61.9 NONTRAUMATIC INTRACEREBRAL HEMORRHAGE, UNSPECIFIED CEREBRAL LOCATION, UNSPECIFIED LATERALITY (HCC): ICD-10-CM

## 2021-06-24 DIAGNOSIS — E66.01 OBESITY, MORBID (HCC): ICD-10-CM

## 2021-06-24 DIAGNOSIS — I10 ESSENTIAL HYPERTENSION: Primary | ICD-10-CM

## 2021-06-24 PROCEDURE — 99214 OFFICE O/P EST MOD 30 MIN: CPT | Performed by: INTERNAL MEDICINE

## 2021-06-24 RX ORDER — SILDENAFIL 100 MG/1
100 TABLET, FILM COATED ORAL AS NEEDED
Qty: 20 TABLET | Refills: 11 | Status: SHIPPED | OUTPATIENT
Start: 2021-06-24

## 2021-06-24 NOTE — PROGRESS NOTES
1. Have you been to the ER, urgent care clinic since your last visit? Hospitalized since your last visit? No    2. Have you seen or consulted any other health care providers outside of the [address] [place] [address] since your last visit? Include any pap smears or colon screening.  No     Wants to discuss Viagra script

## 2021-06-24 NOTE — PROGRESS NOTES
SPORTS MEDICINE AND PRIMARY CARE  Ely Marie MD, [address] [address]  Phone:  [phone]  Fax: [phone]       Chief Complaint   Patient presents with    Hypertension   . SUBJECTIVE:    Marc Dennis is a 39 y.o. male *Patient returns today with a known history of hypertension, intracranial bleed, seizure disorder, morbid obesity, arthritis, and is seen for evaluation. Patient returns today without new complaints except for ED, which we discussed. Current Outpatient Medications   Medication Sig Dispense Refill    sildenafil citrate (VIAGRA) 100 mg tablet Take 1 Tablet by mouth as needed for Erectile Dysfunction. 20 Tablet 11    metoprolol tartrate (LOPRESSOR) 50 mg tablet TAKE 1 TABLET TWICE A  Tab 3    rosuvastatin (CRESTOR) 10 mg tablet Take 1 Tab by mouth nightly. 90 Tab 3    hydroCHLOROthiazide (HYDRODIURIL) 12.5 mg tablet TAKE 1 TABLET DAILY 90 Tab 3    amLODIPine (NORVASC) 5 mg tablet TAKE 1 TABLET DAILY 90 Tab 3    levETIRAcetam (KEPPRA) 500 mg tablet Take 1 Tab by mouth two (2) times a day. 61 Tab 11     Past Medical History:   Diagnosis Date    Abdominal pain 09/12/2019    Arthritis     CKD (chronic kidney disease), stage III (HCC)     Headache     Hematuria     Hypertension     ICH (intracerebral hemorrhage) ([facility] [address] [facility] 07/22/2018    STM    Obesity (BMI 35.0-39.9 without comorbidity)     Plantar fasciitis, right 12/06/2018    Seizure disorder ([facility] 01/19/2020    bishop, - keppra     No past surgical history on file.   No Known Allergies      REVIEW OF SYSTEMS:  General: negative for - chills or fever  ENT: negative for - headaches, nasal congestion or tinnitus  Respiratory: negative for - cough, hemoptysis, shortness of breath or wheezing  Cardiovascular : negative for - chest pain, edema, palpitations or shortness of breath  Gastrointestinal: negative for - abdominal pain, blood in stools, heartburn or nausea/vomiting  Genito-Urinary: no dysuria, trouble voiding, or hematuria  Musculoskeletal: negative for - gait disturbance, joint pain, joint stiffness or joint swelling  Neurological: no TIA or stroke symptoms  Hematologic: no bruises, no bleeding, no swollen glands  Integument: no lumps, mole changes, nail changes or rash  Endocrine: no malaise/lethargy or unexpected weight changes      Social History     Socioeconomic History    Marital status: SINGLE     Spouse name: Not on file    Number of children: Not on file    Years of education: Not on file    Highest education level: Not on file   Tobacco Use    Smoking status: Never Smoker    Smokeless tobacco: Never Used   Vaping Use    Vaping Use: Never used   Substance and Sexual Activity    Alcohol use: No    Drug use: No    Sexual activity: Yes     Partners: Female     Birth control/protection: None   Social History Narrative    Patient is a lifetime nonsmoker, non drinker, non drug abuser.         Social History:  The patient is  lives with his wife Jessica Ott -chf ckd LAB pt who is also the father of his three children, 16, 12 and 2. He completed the 12th grade. 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 Mother 58 - [address] [facility]  with diabetes. Three sisters are alive and well. Social Determinants of Health     Financial Resource Strain:     Difficulty of Paying Living Expenses:    Food Insecurity:     Worried About Running Out of Food in the Last Year:     [address] in the Last Year:    Transportation Needs:     Lack of Transportation (Medical):      Lack of Transportation (Non-Medical):    Physical Activity:     Days of Exercise per Week:     Minutes of Exercise per Session:    Stress:     Feeling of Stress :    Social Connections:     Frequency of Communication with Friends and Family:     Frequency of Social Gatherings with Friends and Family:     Attends Adventism Services:     Active Member of Clubs or Organizations:     Attends Club or Organization Meetings:     Marital Status:      Family History   Problem Relation Age of Onset    Hypertension Mother     Heart Disease Father        OBJECTIVE:    Visit Vitals  /84   Pulse 69   Temp 98 °F (36.7 °C) (Oral)   Resp 18   Ht 5' 9\" (1.753 m)   Wt 284 lb 8 oz (129 kg)   SpO2 97%   BMI 42.01 kg/m²     CONSTITUTIONAL: well , well nourished, appears age appropriate  EYES: perrla, eom intact  ENMT:moist mucous membranes, pharynx clear  NECK: supple. Thyroid normal  RESPIRATORY: Chest: clear bilaterally   CARDIOVASCULAR: Heart: regular rate and rhythm  GASTROINTESTINAL: Abdomen: soft, bowel sounds active  HEMATOLOGIC: no pathological lymph nodes palpated  MUSCULOSKELETAL: Extremities: no edema, pulse 1+   INTEGUMENT: No unusual rashes or suspicious skin lesions noted. Nails appear normal.  NEUROLOGIC: non-focal exam   MENTAL STATUS: alert and oriented, appropriate affect           ASSESSMENT:  1. Essential hypertension    2. Intracranial bleed (HCC)    3. Seizure disorder ([address]    4. Obesity, morbid ([address]    5. Nontraumatic intracerebral hemorrhage, unspecified cerebral location, unspecified laterality ([address]    6. Arthritis    7. Dyslipidemia      Blood pressure control is at goal at 133/84. We suggest he get a blood pressure monitor and check the blood pressure once or twice a month. We would like to see his blood pressure in the 120s/80s, particularly in view of his history of intracranial bleed or intracerebral hemorrhage. History of seizure disorder, but no seizures, on Keppra. Obesity remains a concern, but fortunately since we last saw him, he decided to lose another 3 pounds. We encouraged him to continue weight reduction program.      He will be back to see us in about four months, sooner if he has any problems.       I renew his Viagra and give him the actual paper description so he can mail it in if he desires. We will notify him by MyChart, which is agreeable with him as opposed to a letter, and increase the dose of Rosuvastatin if needed. I have discussed the diagnosis with the patient and the intended plan as seen in the  Orders. The patient understands and agees with the plan. The patient has   received an after visit summary and questions were answered concerning  future plans  Patient labs and/or xrays were reviewed  Past records were reviewed. PLAN:  .  Orders Placed This Encounter    CBC WITH AUTOMATED DIFF    URINALYSIS W/ RFLX MICROSCOPIC    PROSTATE SPECIFIC AG    LIPID PANEL    sildenafil citrate (VIAGRA) 100 mg tablet       Follow-up and Dispositions    · Return in about 4 months (around 10/24/2021). ATTENTION:   This medical record was transcribed using an electronic medical records system. Although proofread, it may and can contain electronic and spelling errors. Other human spelling and other errors may be present. Corrections may be executed at a later time. Please feel free to contact us for any clarifications as needed.

## 2021-06-25 LAB
APPEARANCE UR: CLEAR
BASOPHILS # BLD AUTO: 0 X10E3/UL (ref 0–0.2)
BASOPHILS NFR BLD AUTO: 1 %
BILIRUB UR QL STRIP: NEGATIVE
CHOLEST SERPL-MCNC: 128 MG/DL (ref 100–199)
COLOR UR: YELLOW
EOSINOPHIL # BLD AUTO: 0.1 X10E3/UL (ref 0–0.4)
EOSINOPHIL NFR BLD AUTO: 1 %
ERYTHROCYTE [DISTWIDTH] IN BLOOD BY AUTOMATED COUNT: 13.4 % (ref 11.6–15.4)
GLUCOSE UR QL: NEGATIVE
HCT VFR BLD AUTO: 41.1 % (ref 37.5–51)
HDLC SERPL-MCNC: 46 MG/DL
HGB BLD-MCNC: 13.6 G/DL (ref 13–17.7)
HGB UR QL STRIP: NEGATIVE
IMM GRANULOCYTES # BLD AUTO: 0 X10E3/UL (ref 0–0.1)
IMM GRANULOCYTES NFR BLD AUTO: 0 %
KETONES UR QL STRIP: NEGATIVE
LDLC SERPL CALC-MCNC: 65 MG/DL (ref 0–99)
LEUKOCYTE ESTERASE UR QL STRIP: NEGATIVE
LYMPHOCYTES # BLD AUTO: 3.3 X10E3/UL (ref 0.7–3.1)
LYMPHOCYTES NFR BLD AUTO: 39 %
MCH RBC QN AUTO: 30 PG (ref 26.6–33)
MCHC RBC AUTO-ENTMCNC: 33.1 G/DL (ref 31.5–35.7)
MCV RBC AUTO: 91 FL (ref 79–97)
MICRO URNS: NORMAL
MONOCYTES # BLD AUTO: 0.6 X10E3/UL (ref 0.1–0.9)
MONOCYTES NFR BLD AUTO: 7 %
NEUTROPHILS # BLD AUTO: 4.4 X10E3/UL (ref 1.4–7)
NEUTROPHILS NFR BLD AUTO: 52 %
NITRITE UR QL STRIP: NEGATIVE
PH UR STRIP: 6 [PH] (ref 5–7.5)
PLATELET # BLD AUTO: 286 X10E3/UL (ref 150–450)
PROT UR QL STRIP: NEGATIVE
PSA SERPL-MCNC: 1.2 NG/ML (ref 0–4)
RBC # BLD AUTO: 4.53 X10E6/UL (ref 4.14–5.8)
SP GR UR: 1.02 (ref 1–1.03)
TRIGL SERPL-MCNC: 88 MG/DL (ref 0–149)
UROBILINOGEN UR STRIP-MCNC: 1 MG/DL (ref 0.2–1)
VLDLC SERPL CALC-MCNC: 17 MG/DL (ref 5–40)
WBC # BLD AUTO: 8.4 X10E3/UL (ref 3.4–10.8)

## 2021-07-17 RX ORDER — AMLODIPINE BESYLATE 5 MG/1
TABLET ORAL
Qty: 90 TABLET | Refills: 3 | Status: SHIPPED | OUTPATIENT
Start: 2021-07-17 | End: 2022-08-10 | Stop reason: SDUPTHER

## 2021-07-17 RX ORDER — HYDROCHLOROTHIAZIDE 12.5 MG/1
TABLET ORAL
Qty: 90 TABLET | Refills: 3 | Status: SHIPPED | OUTPATIENT
Start: 2021-07-17 | End: 2022-08-10 | Stop reason: SDUPTHER

## 2021-07-22 RX ORDER — LEVETIRACETAM 500 MG/1
500 TABLET ORAL 2 TIMES DAILY
Qty: 60 TABLET | Refills: 0 | Status: SHIPPED | OUTPATIENT
Start: 2021-07-22 | End: 2021-09-13 | Stop reason: SDUPTHER

## 2021-07-22 NOTE — TELEPHONE ENCOUNTER
----- Message from Reese Glaser sent at 7/22/2021 12:06 PM EDT -----  Regarding: Marley Montgomery- DO / refill #second request   1st request 7/13/21  Caller (if not patient):Relationship of caller (if not patient):Best contact number(s): [phone] of medication and dosage if known: levETIRAcetam (KEPPRA) 500 mg tabIs patient out of this medication (yes/no): 2 days leftPharmacy name: [address] [place] [address] listed in chart? (yes/no):Pharmacy phone number:Phone:  [phone]  Fax:  [phone] Date of last visit: 9/10/20Details to clarify the request: 2nd request. Pt now only has 2 days left.

## 2021-07-22 NOTE — TELEPHONE ENCOUNTER
Spoke with patient, informed him he was due for a follow up with . Scheduled for 7/23. Requesting a refill of his Keppra 500mg.

## 2021-09-13 NOTE — TELEPHONE ENCOUNTER
Requested Prescriptions     Pending Prescriptions Disp Refills    levETIRAcetam (KEPPRA) 500 mg tablet 60 Tablet 0     Sig: Take 1 Tablet by mouth two (2) times a day.      Patient is schedule for next available 1/26

## 2021-09-16 RX ORDER — LEVETIRACETAM 500 MG/1
500 TABLET ORAL 2 TIMES DAILY
Qty: 60 TABLET | Refills: 3 | Status: SHIPPED | OUTPATIENT
Start: 2021-09-16 | End: 2022-01-26 | Stop reason: SDUPTHER

## 2021-11-30 ENCOUNTER — OFFICE VISIT (OUTPATIENT)
Dept: INTERNAL MEDICINE CLINIC | Age: 41
End: 2021-11-30
Payer: COMMERCIAL

## 2021-11-30 VITALS
HEIGHT: 69 IN | HEART RATE: 69 BPM | BODY MASS INDEX: 42.82 KG/M2 | WEIGHT: 289.1 LBS | SYSTOLIC BLOOD PRESSURE: 117 MMHG | RESPIRATION RATE: 18 BRPM | DIASTOLIC BLOOD PRESSURE: 68 MMHG | TEMPERATURE: 98.7 F | OXYGEN SATURATION: 94 %

## 2021-11-30 DIAGNOSIS — I62.9 INTRACRANIAL BLEED (HCC): ICD-10-CM

## 2021-11-30 DIAGNOSIS — I61.9 NONTRAUMATIC INTRACEREBRAL HEMORRHAGE, UNSPECIFIED CEREBRAL LOCATION, UNSPECIFIED LATERALITY (HCC): ICD-10-CM

## 2021-11-30 DIAGNOSIS — E66.01 OBESITY, MORBID (HCC): ICD-10-CM

## 2021-11-30 DIAGNOSIS — G40.909 SEIZURE DISORDER (HCC): ICD-10-CM

## 2021-11-30 DIAGNOSIS — I10 PRIMARY HYPERTENSION: Primary | ICD-10-CM

## 2021-11-30 PROCEDURE — 99214 OFFICE O/P EST MOD 30 MIN: CPT | Performed by: INTERNAL MEDICINE

## 2021-11-30 NOTE — PROGRESS NOTES
SPORTS MEDICINE AND PRIMARY CARE  Cely Gillis MD, [address] [address]  Phone:  [phone]  Fax: [phone]       Chief Complaint   Patient presents with    Hypertension   . SUBJECTIVE:    Lelia Larikn is a 39 y.o. male Patient returns today with a known history of morbid obesity, intracranial bleed, intracerebral hemorrhage, hypertension and is seen for evaluation. Patient complains of soreness of both shoulders, right in the bone, he states. 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 Patient is seen for evaluation. Current Outpatient Medications   Medication Sig Dispense Refill    levETIRAcetam (KEPPRA) 500 mg tablet Take 1 Tablet by mouth two (2) times a day. 60 Tablet 3    hydroCHLOROthiazide (HYDRODIURIL) 12.5 mg tablet TAKE 1 TABLET DAILY 90 Tablet 3    amLODIPine (NORVASC) 5 mg tablet TAKE 1 TABLET DAILY 90 Tablet 3    sildenafil citrate (VIAGRA) 100 mg tablet Take 1 Tablet by mouth as needed for Erectile Dysfunction. 20 Tablet 11    metoprolol tartrate (LOPRESSOR) 50 mg tablet TAKE 1 TABLET TWICE A  Tab 3    rosuvastatin (CRESTOR) 10 mg tablet Take 1 Tab by mouth nightly. 80 Tab 3     Past Medical History:   Diagnosis Date    Abdominal pain 09/12/2019    Arthritis     CKD (chronic kidney disease), stage III (HCC)     Headache     Hematuria     Hypertension     ICH (intracerebral hemorrhage) ([facility] 07/22/2018    STM    Obesity (BMI 35.0-39.9 without comorbidity)     Plantar fasciitis, right 12/06/2018    Seizure disorder ([facility] 01/19/2020    Lion alas     History reviewed. No pertinent surgical history.   No Known Allergies      REVIEW OF SYSTEMS:  General: negative for - chills or fever  ENT: negative for - headaches, nasal congestion or tinnitus  Respiratory: negative for - cough, hemoptysis, shortness of breath or wheezing  Cardiovascular : negative for - chest pain, edema, palpitations or shortness of breath  Gastrointestinal: negative for - abdominal pain, blood in stools, heartburn or nausea/vomiting  Genito-Urinary: no dysuria, trouble voiding, or hematuria  Musculoskeletal: negative for - gait disturbance, joint pain, joint stiffness or joint swelling  Neurological: no TIA or stroke symptoms  Hematologic: no bruises, no bleeding, no swollen glands  Integument: no lumps, mole changes, nail changes or rash  Endocrine: no malaise/lethargy or unexpected weight changes      Social History     Socioeconomic History    Marital status: SINGLE   Tobacco Use    Smoking status: Never Smoker    Smokeless tobacco: Never Used   Vaping Use    Vaping Use: Never used   Substance and Sexual Activity    Alcohol use: No    Drug use: No    Sexual activity: Yes     Partners: Female     Birth control/protection: None   Social History Narrative    Patient is a lifetime nonsmoker, non drinker, non drug abuser.         Social History:  The patient is  lives with his wife Sandrea Schaumann -chf ckd LAB pt who is also the father of his three children, 16, 12 and 2. He completed the 12th grade. 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 Mother 34 - [address] [facility]  with diabetes. Three sisters are alive and well. Family History   Problem Relation Age of Onset    Hypertension Mother     Heart Disease Father        OBJECTIVE:    Visit Vitals  /68   Pulse 69   Temp 98.7 °F (37.1 °C) (Oral)   Resp 18   Ht 5' 9\" (1.753 m)   Wt 289 lb 1.6 oz (131.1 kg)   SpO2 94%   BMI 42.69 kg/m²     CONSTITUTIONAL: well , well nourished, appears age appropriate  EYES: perrla, eom intact  ENMT:moist mucous membranes, pharynx clear  NECK: supple.  Thyroid normal  RESPIRATORY: Chest: clear bilaterally   CARDIOVASCULAR: Heart: regular rate and rhythm  GASTROINTESTINAL: Abdomen: soft, bowel sounds active  HEMATOLOGIC: no pathological lymph nodes palpated  MUSCULOSKELETAL: Extremities: no edema, pulse 1+   INTEGUMENT: No unusual rashes or suspicious skin lesions noted. Nails appear normal.  NEUROLOGIC: non-focal exam   MENTAL STATUS: alert and oriented, appropriate affect           ASSESSMENT:  1. Primary hypertension    2. Intracranial bleed (HCC)    3. Obesity, morbid ([address]    4. Nontraumatic intracerebral hemorrhage, unspecified cerebral location, unspecified laterality ([address]    5. Seizure disorder ([address]      Blood pressure control is adequate, no titration is needed. The bleeds are stable. Morbid obesity remains a challenge and we encourage a heart healthy, weight reducing diet. History of seizure disorder with no recent seizures. He has strained both his shoulders. We offer physical therapy, but he would just prefer pain medicine he states. We will give him Naprosyn to take as needed, told him to take it cautiously and only after meals and only when he has the discomfort. He agrees with the plan. He will be back to see me in three months, sooner if any problems. I have discussed the diagnosis with the patient and the intended plan as seen in the  Orders. The patient understands and agees with the plan. The patient has   received an after visit summary and questions were answered concerning  future plans  Patient labs and/or xrays were reviewed  Past records were reviewed. PLAN:  . No orders of the defined types were placed in this encounter. Follow-up and Dispositions    · Return in about 3 months (around 2/28/2022). Follow-up and Disposition History                ATTENTION:   This medical record was transcribed using an electronic medical records system. Although proofread, it may and can contain electronic and spelling errors. Other human spelling and other errors may be present. Corrections may be executed at a later time. Please feel free to contact us for any clarifications as needed.

## 2021-11-30 NOTE — PROGRESS NOTES
1. Have you been to the ER, urgent care clinic since your last visit? Hospitalized since your last visit? No    2. Have you seen or consulted any other health care providers outside of the [address] [place] [address] since your last visit? Include any pap smears or colon screening.  No     Wants to discuss L&R shoulder issue

## 2022-01-26 ENCOUNTER — OFFICE VISIT (OUTPATIENT)
Dept: NEUROLOGY | Age: 42
End: 2022-01-26
Payer: COMMERCIAL

## 2022-01-26 VITALS
DIASTOLIC BLOOD PRESSURE: 72 MMHG | RESPIRATION RATE: 18 BRPM | SYSTOLIC BLOOD PRESSURE: 110 MMHG | OXYGEN SATURATION: 95 % | WEIGHT: 286 LBS | BODY MASS INDEX: 42.36 KG/M2 | HEART RATE: 82 BPM | HEIGHT: 69 IN

## 2022-01-26 DIAGNOSIS — I61.9 LEFT-SIDED NONTRAUMATIC INTRACEREBRAL HEMORRHAGE, UNSPECIFIED CEREBRAL LOCATION (HCC): ICD-10-CM

## 2022-01-26 DIAGNOSIS — R56.9 WITNESSED SEIZURE-LIKE ACTIVITY (HCC): Primary | ICD-10-CM

## 2022-01-26 PROCEDURE — 99214 OFFICE O/P EST MOD 30 MIN: CPT | Performed by: PSYCHIATRY & NEUROLOGY

## 2022-01-26 RX ORDER — LEVETIRACETAM 500 MG/1
500 TABLET ORAL 2 TIMES DAILY
Qty: 180 TABLET | Refills: 1 | Status: SHIPPED | OUTPATIENT
Start: 2022-01-26 | End: 2022-07-12

## 2022-01-26 NOTE — PROGRESS NOTES
Mayte Dupree is a 39 y.o. male  HIPAA verified by two patient identifiers. Health Maintenance Due   Topic    COVID-19 Vaccine (3 - Booster for Rivera Peter series)     Chief Complaint   Patient presents with    Medication Refill    Follow-up     Visit Vitals  /72   Pulse 82   Resp 18   Ht 5' 9\" (1.753 m)   Wt 286 lb (129.7 kg)   SpO2 95%   BMI 42.23 kg/m²       Pain Scale: 0 - No pain/10  Pain Location:     1. Have you been to the ER, urgent care clinic since your last visit? Hospitalized since your last visit? No    2. Have you seen or consulted any other health care providers outside of the [address] [place] [address] since your last visit? Include any pap smears or colon screening.  No

## 2022-01-26 NOTE — PROGRESS NOTES
Neurology Clinic Follow up Note    Patient ID:  Shiva Wilkinson  [id number]  80 y.o.  1980      Mr. Deng Bolanos is here for follow up today of [address]       Last Appointment With Me:  7/23/2021    \"41 y. o.right handed male presenting for evaluation of ICH. Pt reports severe new onset headache 2 days prior to presentation located over the frontal region b/l, L retro-orbital.  No associated N/V. No seizure activity reported. He denied exposure to antiplatelet or [facility]. No preceding trauma. At presentation to [facility] ED his BP was 165/118mg. No focal weakness, numbness/paresthesias, aphasia (baseline stuttering reported), dysarthria. Head CT 7/22/18 reviewed L temporal ICH with mild associated edema. Evaluated by NSGY with non-surgical management. Subsequent MRI/A without evidence of underlying mass, ischemia or vascular malformation/aneurysm. \"  Interval History:   Patient returns for f/u of ICH/seizure activity. Denies new focal weakness, numbness, recurrent ICH. He reports an episode of confusion lasting just a few seconds at work approximately 2-3 months ago. Otherwise, denies seizure-like activity. He did not complete 24h EEG monitoring as previously ordered. He is compliant with LEV as prescribed. No reported side effects. Episodes of postural dizziness/pre-syncope have resolved since last visit. He did not see Cardiology. PMHx/ PSHx/ FHx/ SHx:  Reviewed and unchanged previous visit. Past Medical History:   Diagnosis Date    Abdominal pain 09/12/2019    Arthritis     CKD (chronic kidney disease), stage III (HCC)     Headache     Hematuria     Hypertension     ICH [facility]intracerebral hemorrhage) ([facility] [address] 07/22/2018    STM    Obesity (BMI 35.0-39.9 without comorbidity)     Plantar fasciitis, right 12/06/2018    Seizure disorder ([facility] [address] 01/19/2020    Lion alas:  Comprehensive review of systems negative except for as noted above.        Objective: Meds:  Current Outpatient Medications   Medication Sig Dispense Refill    levETIRAcetam (KEPPRA) 500 mg tablet Take 1 Tablet by mouth two (2) times a day. 60 Tablet 3    hydroCHLOROthiazide (HYDRODIURIL) 12.5 mg tablet TAKE 1 TABLET DAILY 90 Tablet 3    amLODIPine (NORVASC) 5 mg tablet TAKE 1 TABLET DAILY 90 Tablet 3    sildenafil citrate (VIAGRA) 100 mg tablet Take 1 Tablet by mouth as needed for Erectile Dysfunction. 20 Tablet 11    metoprolol tartrate (LOPRESSOR) 50 mg tablet TAKE 1 TABLET TWICE A  Tab 3    rosuvastatin (CRESTOR) 10 mg tablet Take 1 Tab by mouth nightly. 90 Tab 3       Exam:  Visit Vitals  Pulse 82   Resp 18   Ht 5' 9\" (1.753 m)   Wt 286 lb (129.7 kg)   SpO2 95%   BMI 42.23 kg/m²     NEUROLOGICAL EXAM:  General: Awake, alert, occasional stuttering. CN: EOMI, VF intact, facial strength/sensation normal and symmetric, hearing is intact  Motor: 5/5 throughout  Reflexes: 2/4 throughout  Coordination: No ataxia.   Sensation: LT intact throughout  Gait: Steady    LABS  Results for orders placed or performed in visit on 06/24/21   LIPID PANEL   Result Value Ref Range    Cholesterol, total 128 100 - 199 mg/dL    Triglyceride 88 0 - 149 mg/dL    HDL Cholesterol 46 >39 mg/dL    VLDL, calculated 17 5 - 40 mg/dL    LDL, calculated 65 0 - 99 mg/dL   PSA, DIAGNOSTIC (PROSTATE SPECIFIC AG)   Result Value Ref Range    Prostate Specific Ag 1.2 0.0 - 4.0 ng/mL   URINALYSIS W/ RFLX MICROSCOPIC   Result Value Ref Range    Specific Gravity 1.022 1.005 - 1.030    pH (UA) 6.0 5.0 - 7.5    Color Yellow Yellow    Appearance Clear Clear    Leukocyte Esterase Negative Negative    Protein Negative Negative/Trace    Glucose Negative Negative    Ketone Negative Negative    Blood Negative Negative    Bilirubin Negative Negative    Urobilinogen 1.0 0.2 - 1.0 mg/dL    Nitrites Negative Negative    Microscopic Examination Comment    CBC WITH AUTOMATED DIFF   Result Value Ref Range    WBC 8.4 3.4 - 10.8 x10E3/uL    RBC 4.53 4.14 - 5.80 x10E6/uL    HGB 13.6 13.0 - 17.7 g/dL    HCT 41.1 37.5 - 51.0 %    MCV 91 79 - 97 fL    MCH 30.0 26.6 - 33.0 pg    MCHC 33.1 31.5 - 35.7 g/dL    RDW 13.4 11.6 - 15.4 %    PLATELET 476 209 - 024 x10E3/uL    NEUTROPHILS 52 Not Estab. %    Lymphocytes 39 Not Estab. %    MONOCYTES 7 Not Estab. %    EOSINOPHILS 1 Not Estab. %    BASOPHILS 1 Not Estab. %    ABS. NEUTROPHILS 4.4 1.4 - 7.0 x10E3/uL    Abs Lymphocytes 3.3 (H) 0.7 - 3.1 x10E3/uL    ABS. MONOCYTES 0.6 0.1 - 0.9 x10E3/uL    ABS. EOSINOPHILS 0.1 0.0 - 0.4 x10E3/uL    ABS. BASOPHILS 0.0 0.0 - 0.2 x10E3/uL    IMMATURE GRANULOCYTES 0 Not Estab. %    ABS. IMM. GRANS. 0.0 0.0 - 0.1 x10E3/uL       IMAGING:  MRI Results (most recent):  Results from Hospital Encounter encounter on 07/22/18    MRA BRAIN WO CONT    Narrative  EXAM:  MRI BRAIN W WO CONT, MRA BRAIN WO CONT    INDICATION: Headache. Left cerebral intracranial intra-axial hematoma on CT. Hypertension. COMPARISON: CT head on 7/22/2018. TECHNIQUE: Multisequence, multiplanar MRI of the brain before and following  uneventful intravenous administration of gadolinium 20 mL Dotarem. Noncontrast  time of flight MR angiography of the head. Multiplanar maximum intensity  projection reformats. (2 separate studies reported together)    FINDINGS: MRI brain: Hyperintense T1, hypointense T2 intra-axial hematoma in the  posterior left temporal lobe measures 4.4 x 2.7 x 1.8 cm, unchanged by my  measurements. Estimated volume is 10.7 mL. There is surrounding vasogenic edema, similar to the CT. No associated  restricted diffusion. No underlying mass or vascular malformation. There is no hydrocephalus. There is no midline shift. No extra-axial fluid  collection. No pathologic enhancement. No restricted diffusion to indicate acute  infarct. The midline structures, including the cervicomedullary junction, are  within normal limits. MRA head: The vertebral arteries are codominant.  The basilar artery and its  branches are normal. The internal carotid, anterior cerebral, and middle  cerebral arteries are patent. There is no flow-limiting intracranial stenosis. There is no aneurysm. There are no sizable posterior communicating arteries. Impression  IMPRESSION:  1. Unchanged 10.7 mL intra-axial subacute hematoma in the posterior left  temporal lobe. Unchanged surrounding vasogenic edema. No evidence of underlying  infarct, mass, or vascular malformation. 2. Normal MR angiography of the head. CT Results (most recent):  Results from Hospital Encounter encounter on 01/19/20    CT HEAD WO CONT    Narrative  EXAM: CT HEAD WO CONT  Clinical history: New onset seizure  INDICATION: seizure    COMPARISON: None. CONTRAST: None. TECHNIQUE: Unenhanced CT of the head was performed using 5 mm images. Brain and  bone windows were generated. CT dose reduction was achieved through use of a  standardized protocol tailored for this examination and automatic exposure  control for dose modulation. FINDINGS:  The ventricles and sulci are normal in size, shape and configuration and  midline. There is no significant white matter disease. There is no intracranial  hemorrhage, extra-axial collection, mass, mass effect or midline shift. The  basilar cisterns are open. No acute infarct is identified. The bone windows  demonstrate no abnormalities. The visualized portions of the paranasal sinuses  and mastoid air cells are clear. Impression  IMPRESSION: No acute intracranial process. Assessment:     Encounter Diagnoses     ICD-10-CM ICD-9-CM   1. Witnessed seizure-like activity (HCC)  R56.9 780.39   2. Left-sided nontraumatic intracerebral hemorrhage, unspecified cerebral location [facility])  I61.9 431      39 y.o. AAM here for f/u, previously seen during admission for hypertensive L temporal ICH 7/22/18. MRI/A without evidence of underlying mass, ischemia or vascular malformation/aneurysm.  H/O reported seizure-like activity 1/19/20 (GTC), subsequently maintained on LEV for seizure prophylaxis. EEG was completed and normal. Head CT was repeated at that time without acute intracranial process. He reports a single episode of brief AMS since last visit without accompanying seizure-like activity. 24h EEG is still pending. Will continue current AEDs for seizure prophylaxis. Plan:   Continue LEV 500mg BID   24h EEG monitoring pending  Syncope/seizure precautions    Follow-up and Dispositions    · Return in about 6 months (around 7/26/2022).          Signed:  Wong Patel DO  1/26/2022  9:48 AM

## 2022-02-15 RX ORDER — METOPROLOL TARTRATE 50 MG/1
TABLET ORAL
Qty: 180 TABLET | Refills: 3 | Status: SHIPPED | OUTPATIENT
Start: 2022-02-15 | End: 2022-09-23 | Stop reason: SDUPTHER

## 2022-02-28 RX ORDER — ROSUVASTATIN CALCIUM 10 MG/1
TABLET, COATED ORAL
Qty: 90 TABLET | Refills: 3 | Status: SHIPPED | OUTPATIENT
Start: 2022-02-28 | End: 2022-09-23 | Stop reason: SDUPTHER

## 2022-06-24 ENCOUNTER — OFFICE VISIT (OUTPATIENT)
Dept: INTERNAL MEDICINE CLINIC | Age: 42
End: 2022-06-24
Payer: COMMERCIAL

## 2022-06-24 VITALS
DIASTOLIC BLOOD PRESSURE: 68 MMHG | SYSTOLIC BLOOD PRESSURE: 120 MMHG | HEART RATE: 63 BPM | RESPIRATION RATE: 16 BRPM | TEMPERATURE: 98.9 F | BODY MASS INDEX: 42.95 KG/M2 | OXYGEN SATURATION: 96 % | WEIGHT: 290 LBS | HEIGHT: 69 IN

## 2022-06-24 DIAGNOSIS — I62.9 INTRACRANIAL BLEED (HCC): ICD-10-CM

## 2022-06-24 DIAGNOSIS — M25.511 CHRONIC PAIN OF BOTH SHOULDERS: ICD-10-CM

## 2022-06-24 DIAGNOSIS — E66.01 OBESITY, CLASS III, BMI 40-49.9 (MORBID OBESITY) (HCC): ICD-10-CM

## 2022-06-24 DIAGNOSIS — I61.9 NONTRAUMATIC INTRACEREBRAL HEMORRHAGE, UNSPECIFIED CEREBRAL LOCATION, UNSPECIFIED LATERALITY (HCC): ICD-10-CM

## 2022-06-24 DIAGNOSIS — G89.29 CHRONIC PAIN OF BOTH SHOULDERS: ICD-10-CM

## 2022-06-24 DIAGNOSIS — G40.909 SEIZURE DISORDER (HCC): ICD-10-CM

## 2022-06-24 DIAGNOSIS — I10 PRIMARY HYPERTENSION: Primary | ICD-10-CM

## 2022-06-24 DIAGNOSIS — M25.512 CHRONIC PAIN OF BOTH SHOULDERS: ICD-10-CM

## 2022-06-24 DIAGNOSIS — E66.01 MORBID OBESITY WITH BMI OF 40.0-44.9, ADULT (HCC): ICD-10-CM

## 2022-06-24 DIAGNOSIS — E08.9 DIABETES MELLITUS DUE TO UNDERLYING CONDITION WITHOUT COMPLICATION, WITHOUT LONG-TERM CURRENT USE OF INSULIN (HCC): ICD-10-CM

## 2022-06-24 PROCEDURE — 36415 COLL VENOUS BLD VENIPUNCTURE: CPT | Performed by: INTERNAL MEDICINE

## 2022-06-24 PROCEDURE — 99214 OFFICE O/P EST MOD 30 MIN: CPT | Performed by: INTERNAL MEDICINE

## 2022-06-24 NOTE — PROGRESS NOTES
Chief Complaint   Patient presents with    Hypertension     Patient is here for a follow up    Shoulder Pain     Patient states he has pain in both shouders      1. Have you been to the ER, urgent care clinic since your last visit? Hospitalized since your last visit? No    2. Have you seen or consulted any other health care providers outside of the [address] [place] [address] since your last visit? Include any pap smears or colon screening.  No

## 2022-06-24 NOTE — PROGRESS NOTES
[facility]  Lyn Odom MD, [address] [address]  Phone:  [phone]  Fax: [phone]       Chief Complaint   Patient presents with    Hypertension     Patient is here for a follow up    Shoulder Pain     Patient states he has pain in both shouders    . SUBJECTIVE:    Boy Turner is a 43 y.o. male Patient returns today with a known history of chronic kidney disease, hypertension, intracranial hemorrhage, morbid obesity, seizure disorder, followed by Dr. Khloe Bone, and complains of bilateral shoulder discomfort ever since he was doing some lifting with weights. He only has the discomfort when he does something to lift, for example taking the trash out, etc.  Patient is seen for evaluation. Current Outpatient Medications   Medication Sig Dispense Refill    rosuvastatin (CRESTOR) 10 mg tablet TAKE 1 TABLET NIGHTLY 90 Tablet 3    metoprolol tartrate (LOPRESSOR) 50 mg tablet TAKE 1 TABLET TWICE A  Tablet 3    levETIRAcetam (KEPPRA) 500 mg tablet Take 1 Tablet by mouth two (2) times a day. 180 Tablet 1    hydroCHLOROthiazide (HYDRODIURIL) 12.5 mg tablet TAKE 1 TABLET DAILY 90 Tablet 3    amLODIPine (NORVASC) 5 mg tablet TAKE 1 TABLET DAILY 90 Tablet 3    sildenafil citrate (VIAGRA) 100 mg tablet Take 1 Tablet by mouth as needed for Erectile Dysfunction. 20 Tablet 11     Past Medical History:   Diagnosis Date    Abdominal pain 09/12/2019    Arthritis     CKD (chronic kidney disease), stage III (HCC)     Headache     Hematuria     Hypertension     ICH (intracerebral hemorrhage) ([facility] [address] 07/22/2018    STM    Morbid obesity with BMI of 40.0-44.9, adult ([facility] [address]     Plantar fasciitis, right 12/06/2018    Seizure disorder ([facility] [address] 01/19/2020    bishop, - keppra    Shoulder pain, bilateral 06/24/2022     No past surgical history on file.   No Known Allergies      REVIEW OF SYSTEMS:  General: negative for - chills or fever  ENT: negative for - headaches, nasal congestion or tinnitus  Respiratory: negative for - cough, hemoptysis, shortness of breath or wheezing  Cardiovascular : negative for - chest pain, edema, palpitations or shortness of breath  Gastrointestinal: negative for - abdominal pain, blood in stools, heartburn or nausea/vomiting  Genito-Urinary: no dysuria, trouble voiding, or hematuria  Musculoskeletal: negative for - gait disturbance, joint pain, joint stiffness or joint swelling  Neurological: no TIA or stroke symptoms  Hematologic: no bruises, no bleeding, no swollen glands  Integument: no lumps, mole changes, nail changes or rash  Endocrine: no malaise/lethargy or unexpected weight changes      Social History     Socioeconomic History    Marital status: SINGLE   Tobacco Use    Smoking status: Never Smoker    Smokeless tobacco: Never Used   Vaping Use    Vaping Use: Never used   Substance and Sexual Activity    Alcohol use: No    Drug use: No    Sexual activity: Yes     Partners: Female     Birth control/protection: None   Social History Narrative    Patient is a lifetime nonsmoker, non drinker, non drug abuser.         Social History:  The patient is  lives with his wife Alberta Navas -[facility] pt who is also the father of his three children, 16, 12 and 2. He completed the 12th grade. 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 Mother 62 - [address] [facility]  with diabetes. Three sisters are alive and well. Family History   Problem Relation Age of Onset    Hypertension Mother     Heart Disease Father        OBJECTIVE:    Visit Vitals  /68   Pulse 63   Temp 98.9 °F (37.2 °C)   Resp 16   Ht 5' 9\" (1.753 m)   Wt 290 lb (131.5 kg)   SpO2 96%   BMI 42.83 kg/m²     CONSTITUTIONAL: well , well nourished, appears age appropriate  EYES: perrla, eom intact  ENMT:moist mucous membranes, pharynx clear  NECK: supple.  Thyroid normal  RESPIRATORY: Chest: clear bilaterally   CARDIOVASCULAR: Heart: regular rate and rhythm  GASTROINTESTINAL: Abdomen: soft, bowel sounds active  HEMATOLOGIC: no pathological lymph nodes palpated  MUSCULOSKELETAL: Extremities: no edema, pulse 1+   INTEGUMENT: No unusual rashes or suspicious skin lesions noted. Nails appear normal.  NEUROLOGIC: non-focal exam   MENTAL STATUS: alert and oriented, appropriate affect           ASSESSMENT:  1. Primary hypertension    2. )    3. Obesity, Class III, BMI 40-49.9 (morbid obesity) ([address]    4. Intracranial bleed ([facility] [address]    5. Nontraumatic intracerebral hemorrhage, unspecified cerebral location, unspecified laterality ([facility] [address]    6. Seizure disorder ([facility] [address]    7. Chronic pain of both shoulders    8. Morbid obesity with BMI of 40.0-44.9, adult ([facility] [address]      There is a diagnosis of diabetes listed. I am not sure where the diagnosis came from, I cannot find the location in the chart. He does not have a history of diabetes. Blood pressure control is at goal.    Obesity remains a concern. He has gained another 4 pounds since we last saw him and we encourage a heart healthy, weight reducing diet. His intracranial hemorrhages have been followed by neurology and they are quiescent. No recent seizures    Pain in his shoulder I think is related to a strain of the shoulder. We give him physical therapy. He will use Advil on a prn basis. We encourage a heart healthy, weight reducing diet. He will be back to see me in four months, sooner if he has any problems. I have discussed the diagnosis with the patient and the intended plan as seen in the  Orders. The patient understands and agees with the plan. The patient has   received an after visit summary and questions were answered concerning  future plans  Patient labs and/or xrays were reviewed  Past records were reviewed.     PLAN:  .  Orders Placed This Encounter    URINALYSIS W/ RFLX MICROSCOPIC    CBC WITH AUTOMATED DIFF    METABOLIC PANEL, COMPREHENSIVE    LIPID PANEL    HEMOGLOBIN A1C WITH EAG    PROSTATE SPECIFIC AG    REFERRAL TO PHYSICAL THERAPY       Follow-up and Dispositions    · Return in about 4 months (around 10/24/2022). ATTENTION:   This medical record was transcribed using an electronic medical records system. Although proofread, it may and can contain electronic and spelling errors. Other human spelling and other errors may be present. Corrections may be executed at a later time. Please feel free to contact us for any clarifications as needed.

## 2022-06-27 PROBLEM — R73.02 IGT (IMPAIRED GLUCOSE TOLERANCE): Status: ACTIVE | Noted: 2022-06-27

## 2022-06-27 LAB
ALBUMIN SERPL-MCNC: 4.6 G/DL (ref 4–5)
ALBUMIN/GLOB SERPL: 1.5 {RATIO} (ref 1.2–2.2)
ALP SERPL-CCNC: 110 IU/L (ref 44–121)
ALT SERPL-CCNC: 23 IU/L (ref 0–44)
APPEARANCE UR: CLEAR
AST SERPL-CCNC: 19 IU/L (ref 0–40)
BASOPHILS # BLD AUTO: 0 X10E3/UL (ref 0–0.2)
BASOPHILS NFR BLD AUTO: 0 %
BILIRUB SERPL-MCNC: 0.3 MG/DL (ref 0–1.2)
BILIRUB UR QL STRIP: NEGATIVE
BUN SERPL-MCNC: 16 MG/DL (ref 6–24)
BUN/CREAT SERPL: 13 (ref 9–20)
CALCIUM SERPL-MCNC: 9.4 MG/DL (ref 8.7–10.2)
CHLORIDE SERPL-SCNC: 104 MMOL/L (ref 96–106)
CHOLEST SERPL-MCNC: 158 MG/DL (ref 100–199)
CO2 SERPL-SCNC: 24 MMOL/L (ref 20–29)
COLOR UR: YELLOW
CREAT SERPL-MCNC: 1.23 MG/DL (ref 0.76–1.27)
EGFR: 75 ML/MIN/1.73
EOSINOPHIL # BLD AUTO: 0.1 X10E3/UL (ref 0–0.4)
EOSINOPHIL NFR BLD AUTO: 2 %
ERYTHROCYTE [DISTWIDTH] IN BLOOD BY AUTOMATED COUNT: 13.2 % (ref 11.6–15.4)
EST. AVERAGE GLUCOSE BLD GHB EST-MCNC: 123 MG/DL
GLOBULIN SER CALC-MCNC: 3 G/DL (ref 1.5–4.5)
GLUCOSE SERPL-MCNC: 100 MG/DL (ref 65–99)
GLUCOSE UR QL STRIP: NEGATIVE
HBA1C MFR BLD: 5.9 % (ref 4.8–5.6)
HCT VFR BLD AUTO: 43.9 % (ref 37.5–51)
HDLC SERPL-MCNC: 56 MG/DL
HGB BLD-MCNC: 14.1 G/DL (ref 13–17.7)
HGB UR QL STRIP: NEGATIVE
IMM GRANULOCYTES # BLD AUTO: 0 X10E3/UL (ref 0–0.1)
IMM GRANULOCYTES NFR BLD AUTO: 0 %
KETONES UR QL STRIP: NEGATIVE
LDLC SERPL CALC-MCNC: 88 MG/DL (ref 0–99)
LEUKOCYTE ESTERASE UR QL STRIP: NEGATIVE
LYMPHOCYTES # BLD AUTO: 3 X10E3/UL (ref 0.7–3.1)
LYMPHOCYTES NFR BLD AUTO: 41 %
MCH RBC QN AUTO: 29.7 PG (ref 26.6–33)
MCHC RBC AUTO-ENTMCNC: 32.1 G/DL (ref 31.5–35.7)
MCV RBC AUTO: 92 FL (ref 79–97)
MICRO URNS: NORMAL
MONOCYTES # BLD AUTO: 0.5 X10E3/UL (ref 0.1–0.9)
MONOCYTES NFR BLD AUTO: 7 %
NEUTROPHILS # BLD AUTO: 3.5 X10E3/UL (ref 1.4–7)
NEUTROPHILS NFR BLD AUTO: 50 %
NITRITE UR QL STRIP: NEGATIVE
PH UR STRIP: 5.5 [PH] (ref 5–7.5)
PLATELET # BLD AUTO: 283 X10E3/UL (ref 150–450)
POTASSIUM SERPL-SCNC: 4.4 MMOL/L (ref 3.5–5.2)
PROT SERPL-MCNC: 7.6 G/DL (ref 6–8.5)
PROT UR QL STRIP: NEGATIVE
PSA SERPL-MCNC: 1.4 NG/ML (ref 0–4)
RBC # BLD AUTO: 4.75 X10E6/UL (ref 4.14–5.8)
SODIUM SERPL-SCNC: 141 MMOL/L (ref 134–144)
SP GR UR STRIP: 1.02 (ref 1–1.03)
TRIGL SERPL-MCNC: 73 MG/DL (ref 0–149)
UROBILINOGEN UR STRIP-MCNC: 0.2 MG/DL (ref 0.2–1)
VLDLC SERPL CALC-MCNC: 14 MG/DL (ref 5–40)
WBC # BLD AUTO: 7.2 X10E3/UL (ref 3.4–10.8)

## 2022-06-27 NOTE — PROGRESS NOTES
Lab studies are normal with the exception of the hemoglobin A1c:    Your laboratory studies indicate you have pre diabetes.   You can decrease your risk of developing diabetes by maintaining your ideal body weight, exercising 30 minutes 5 times per week, and avoiding sugar, white bread, and white potatoes

## 2022-07-12 DIAGNOSIS — R56.9 WITNESSED SEIZURE-LIKE ACTIVITY (HCC): Primary | ICD-10-CM

## 2022-08-10 RX ORDER — HYDROCHLOROTHIAZIDE 12.5 MG/1
TABLET ORAL
Qty: 90 TABLET | Refills: 3 | Status: SHIPPED | OUTPATIENT
Start: 2022-08-10 | End: 2022-09-23 | Stop reason: SDUPTHER

## 2022-08-10 RX ORDER — AMLODIPINE BESYLATE 5 MG/1
TABLET ORAL
Qty: 90 TABLET | Refills: 3 | Status: SHIPPED | OUTPATIENT
Start: 2022-08-10 | End: 2022-09-23 | Stop reason: SDUPTHER

## 2022-08-15 ENCOUNTER — HOSPITAL ENCOUNTER (OUTPATIENT)
Dept: NEUROLOGY | Age: 42
Discharge: HOME | End: 2022-08-15
Attending: PSYCHIATRY & NEUROLOGY
Payer: COMMERCIAL

## 2022-08-15 DIAGNOSIS — R56.9 WITNESSED SEIZURE-LIKE ACTIVITY (HCC): ICD-10-CM

## 2022-08-15 PROCEDURE — 95714 VEEG EA 12-26 HR UNMNTR: CPT

## 2022-09-01 PROCEDURE — 95719 EEG PHYS/QHP EA INCR W/O VID: CPT | Performed by: PSYCHIATRY & NEUROLOGY

## 2022-09-01 NOTE — PROCEDURES
[address]  EEG    Name:  Reynaldo Casanova  MR#:  [id number]  :  1980  ACCOUNT #:  [de-identified]  DATE OF SERVICE:  08/15/2022      REFERRING PHYSICIAN:  Dr. Mary Felipe. HISTORY:  The patient is a 41-year-old male with history of left temporal hemorrhage and seizure, who is being evaluated for baseline electrocerebral activities. DESCRIPTION:  This is an 18-channel prolonged ambulatory EEG performed on 08/15/2022. The recording starts at 09:26 a.m. and continues until 10:34 a.m. on 2022. The dominant posterior background rhythm consists of symmetric, very well-modulated medium voltage rhythms in the 9-10 Hz frequency range out of the posterior head region. Drowsiness is characterized by slowing and vertex waves in the central region. Stage II non-REM sleep was characterized by sleep spindles. Deeper stages of sleep revealed predominance of delta frequencies. The patient's event diary was reviewed and no unusual or seizure-like activity was reported. EEG SUMMARY:  Normal study. CLINICAL INTERPRETATION:  This was a normal prolonged ambulatory EEG. No lateralizing or epileptiform features were noted. No clinical seizures were reported and no electrographic seizures were seen.       Adis Ramirez MD      [id number]  D:  2022 13:01  T:  2022 13:34  JOB #:  [id number]

## 2022-09-06 ENCOUNTER — TELEPHONE (OUTPATIENT)
Dept: NEUROLOGY | Age: 42
End: 2022-09-06

## 2022-09-23 ENCOUNTER — OFFICE VISIT (OUTPATIENT)
Dept: INTERNAL MEDICINE CLINIC | Age: 42
End: 2022-09-23
Payer: COMMERCIAL

## 2022-09-23 VITALS
BODY MASS INDEX: 43.01 KG/M2 | HEART RATE: 65 BPM | RESPIRATION RATE: 18 BRPM | OXYGEN SATURATION: 99 % | DIASTOLIC BLOOD PRESSURE: 78 MMHG | HEIGHT: 69 IN | TEMPERATURE: 98.4 F | SYSTOLIC BLOOD PRESSURE: 127 MMHG | WEIGHT: 290.4 LBS

## 2022-09-23 DIAGNOSIS — I62.9 INTRACRANIAL BLEED (HCC): ICD-10-CM

## 2022-09-23 DIAGNOSIS — R73.02 IGT (IMPAIRED GLUCOSE TOLERANCE): ICD-10-CM

## 2022-09-23 DIAGNOSIS — E66.01 MORBID OBESITY WITH BMI OF 40.0-44.9, ADULT (HCC): ICD-10-CM

## 2022-09-23 DIAGNOSIS — G40.909 SEIZURE DISORDER (HCC): ICD-10-CM

## 2022-09-23 DIAGNOSIS — I10 PRIMARY HYPERTENSION: Primary | ICD-10-CM

## 2022-09-23 PROCEDURE — 99214 OFFICE O/P EST MOD 30 MIN: CPT | Performed by: INTERNAL MEDICINE

## 2022-09-23 RX ORDER — ROSUVASTATIN CALCIUM 10 MG/1
TABLET, COATED ORAL
Qty: 90 TABLET | Refills: 3 | Status: SHIPPED | OUTPATIENT
Start: 2022-09-23

## 2022-09-23 RX ORDER — LEVETIRACETAM 500 MG/1
TABLET ORAL
Qty: 180 TABLET | Refills: 3 | Status: SHIPPED | OUTPATIENT
Start: 2022-09-23

## 2022-09-23 RX ORDER — AMLODIPINE BESYLATE 5 MG/1
TABLET ORAL
Qty: 90 TABLET | Refills: 3 | Status: SHIPPED | OUTPATIENT
Start: 2022-09-23

## 2022-09-23 RX ORDER — METOPROLOL TARTRATE 50 MG/1
TABLET ORAL
Qty: 180 TABLET | Refills: 3 | Status: SHIPPED | OUTPATIENT
Start: 2022-09-23

## 2022-09-23 RX ORDER — HYDROCHLOROTHIAZIDE 12.5 MG/1
TABLET ORAL
Qty: 90 TABLET | Refills: 3 | Status: SHIPPED | OUTPATIENT
Start: 2022-09-23

## 2022-09-23 NOTE — PROGRESS NOTES
Breanna Torres is a 43 y.o. male    Chief Complaint   Patient presents with    Hypertension     1. Have you been to the ER, urgent care clinic since your last visit? Hospitalized since your last visit? No    2. Have you seen or consulted any other health care providers outside of the [address] [place] [address] since your last visit? Include any pap smears or colon screening.  No

## 2022-09-23 NOTE — PROGRESS NOTES
SPORTS MEDICINE AND PRIMARY CARE  Yareli Peterson MD, [address] [address]  Phone:  [phone]  Fax: [phone]       Chief Complaint   Patient presents with    Hypertension   . SUBJECTIVE:    Shona Nguyen is a 43 y.o. male Patient returns today with a known history of hypertension, intracranial bleed, seizure disorder, morbid obesity, impaired glucose tolerance and is seen for evaluation. He is also followed by Melo Ryder, neurology, for witnessed seizure-like activity. He has no new complaints. He remains concerned about his wife and is seen for evaluation. He has had no seizures since I last saw him. Current Outpatient Medications   Medication Sig Dispense Refill    levETIRAcetam (KEPPRA) 500 mg tablet TAKE 1 TABLET TWICE A  Tablet 3    amLODIPine (NORVASC) 5 mg tablet TAKE 1 TABLET DAILY 90 Tablet 3    hydroCHLOROthiazide (HYDRODIURIL) 12.5 mg tablet TAKE 1 TABLET DAILY 90 Tablet 3    metoprolol tartrate (LOPRESSOR) 50 mg tablet TAKE 1 TABLET TWICE A  Tablet 3    rosuvastatin (CRESTOR) 10 mg tablet TAKE 1 TABLET NIGHTLY 90 Tablet 3    sildenafil citrate (VIAGRA) 100 mg tablet Take 1 Tablet by mouth as needed for Erectile Dysfunction. 20 Tablet 11     Past Medical History:   Diagnosis Date    Abdominal pain 09/12/2019    Arthritis     CKD (chronic kidney disease), stage III (HCC)     Headache     Hematuria     Hypertension     ICH (intracerebral hemorrhage) ([facility] [address] 07/22/2018    STM    IGT (impaired glucose tolerance) 06/27/2022    Morbid obesity with BMI of 40.0-44.9, adult ([facility] [address]     Plantar fasciitis, right 12/06/2018    Seizure disorder ([facility] [address] 01/19/2020    bishop, - keppra    Shoulder pain, bilateral 06/24/2022     History reviewed. No pertinent surgical history.   No Known Allergies      REVIEW OF SYSTEMS:  General: negative for - chills or fever  ENT: negative for - headaches, nasal congestion or tinnitus  Respiratory: negative for - cough, hemoptysis, shortness of breath or wheezing  Cardiovascular : negative for - chest pain, edema, palpitations or shortness of breath  Gastrointestinal: negative for - abdominal pain, blood in stools, heartburn or nausea/vomiting  Genito-Urinary: no dysuria, trouble voiding, or hematuria  Musculoskeletal: negative for - gait disturbance, joint pain, joint stiffness or joint swelling  Neurological: no TIA or stroke symptoms  Hematologic: no bruises, no bleeding, no swollen glands  Integument: no lumps, mole changes, nail changes or rash  Endocrine: no malaise/lethargy or unexpected weight changes      Social History     Socioeconomic History    Marital status: SINGLE   Tobacco Use    Smoking status: Never    Smokeless tobacco: Never   Vaping Use    Vaping Use: Never used   Substance and Sexual Activity    Alcohol use: No    Drug use: No    Sexual activity: Yes     Partners: Female     Birth control/protection: None   Social History Narrative    Patient is a lifetime nonsmoker, non drinker, non drug abuser. Social History:  The patient is  lives with his wife Raheel Segovia -[facility] ck[facility] LAB pt who is also the mother of his three children, 16, 12 and 2. He completed the 12th grade. 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 Mother 51 - [address] [facility]  with diabetes. Three sisters are alive and well. Family History   Problem Relation Age of Onset    Hypertension Mother     Heart Disease Father        OBJECTIVE:    Visit Vitals  /78 (BP 1 Location: Left upper arm, BP Patient Position: Sitting)   Pulse 65   Temp 98.4 °F (36.9 °C) (Oral)   Resp 18   Ht 5' 9\" (1.753 m)   Wt 290 lb 6.4 oz (131.7 kg)   SpO2 99%   BMI 42.88 kg/m²     CONSTITUTIONAL: well , well nourished, appears age appropriate  EYES: perrla, eom intact  ENMT:moist mucous membranes, pharynx clear  NECK: supple.  Thyroid normal  RESPIRATORY: Chest: clear bilaterally   CARDIOVASCULAR: Heart: regular rate and rhythm  GASTROINTESTINAL: Abdomen: soft, bowel sounds active  HEMATOLOGIC: no pathological lymph nodes palpated  MUSCULOSKELETAL: Extremities: no edema, pulse 1+   INTEGUMENT: No unusual rashes or suspicious skin lesions noted. Nails appear normal.  NEUROLOGIC: non-focal exam   MENTAL STATUS: alert and oriented, appropriate affect           ASSESSMENT:  1. Primary hypertension    2. Intracranial bleed (HCC)    3. Seizure disorder ([facility] [address]    4. Morbid obesity with BMI of 40.0-44.9, adult ([facility] [address]    5. IGT (impaired glucose tolerance)      The soreness he experiences when he gets off of work I think is directly related to work. Tylenol seems to help and therefore we will not add any other medications. Blood pressure control is at goal.    History of intracranial bleed, which neurology is following. No seizures since we last saw him. He wonders if he should get off the [address] since his EEG was normal and we suggest he not do that and that he keep taking it unless his neurologist says otherwise. Morbid obesity remains a concern. He has not lost any weight. We encourage a heart healthy, weight reducing diet, which will be helpful to him, as well as his wife. He has a history of impaired glucose tolerance and we advise him of its implications. He will be back to see me in four to six months, sooner if he has any problems. I have discussed the diagnosis with the patient and the intended plan as seen in the  Orders. The patient understands and agees with the plan. The patient has   received an after visit summary and questions were answered concerning  future plans  Patient labs and/or xrays were reviewed  Past records were reviewed.     PLAN:  .  Orders Placed This Encounter    levETIRAcetam (KEPPRA) 500 mg tablet    amLODIPine (NORVASC) 5 mg tablet    hydroCHLOROthiazide (HYDRODIURIL) 12.5 mg tablet    metoprolol tartrate (LOPRESSOR) 50 mg tablet    rosuvastatin (CRESTOR) 10 mg tablet       Follow-up and Dispositions    Return in about 4 months (around 1/23/2023). ATTENTION:   This medical record was transcribed using an electronic medical records system. Although proofread, it may and can contain electronic and spelling errors. Other human spelling and other errors may be present. Corrections may be executed at a later time. Please feel free to contact us for any clarifications as needed.

## 2023-01-25 ENCOUNTER — OFFICE VISIT (OUTPATIENT)
Dept: NEUROLOGY | Age: 43
End: 2023-01-25
Payer: COMMERCIAL

## 2023-01-25 VITALS
BODY MASS INDEX: 41.32 KG/M2 | WEIGHT: 279 LBS | OXYGEN SATURATION: 97 % | SYSTOLIC BLOOD PRESSURE: 136 MMHG | HEIGHT: 69 IN | DIASTOLIC BLOOD PRESSURE: 78 MMHG | HEART RATE: 95 BPM

## 2023-01-25 DIAGNOSIS — R56.9 WITNESSED SEIZURE-LIKE ACTIVITY (HCC): Primary | ICD-10-CM

## 2023-01-25 DIAGNOSIS — I61.9 LEFT-SIDED NONTRAUMATIC INTRACEREBRAL HEMORRHAGE, UNSPECIFIED CEREBRAL LOCATION (HCC): ICD-10-CM

## 2023-01-25 PROCEDURE — 3075F SYST BP GE 130 - 139MM HG: CPT | Performed by: PSYCHIATRY & NEUROLOGY

## 2023-01-25 PROCEDURE — 99214 OFFICE O/P EST MOD 30 MIN: CPT | Performed by: PSYCHIATRY & NEUROLOGY

## 2023-01-25 PROCEDURE — 3078F DIAST BP <80 MM HG: CPT | Performed by: PSYCHIATRY & NEUROLOGY

## 2023-01-25 RX ORDER — LEVETIRACETAM 500 MG/1
TABLET ORAL
Qty: 180 TABLET | Refills: 3 | Status: SHIPPED | OUTPATIENT
Start: 2023-01-25 | End: 2023-01-27 | Stop reason: SDUPTHER

## 2023-01-25 NOTE — PROGRESS NOTES
Neurology Clinic Follow up Note    Patient ID:  Christoph Palacios  [id number]  1 y.o.  1980      Mr. Karen Salas is here for follow up today of [address]       Last Appointment With Me:  1/26/2022    \"43 y. o.right handed male presenting for evaluation of ICH. Pt reports severe new onset headache 2 days prior to presentation located over the frontal region b/l, L retro-orbital.  No associated N/V. No seizure activity reported. He denied exposure to antiplatelet or [email or web address]. No preceding trauma. At presentation to [facility] ED his BP was 165/118mg. No focal weakness, numbness/paresthesias, aphasia (baseline stuttering reported), dysarthria. Head CT 7/22/18 reviewed L temporal ICH with mild associated edema. Evaluated by NSGY with non-surgical management. Subsequent MRI/A without evidence of underlying mass, ischemia or vascular malformation/aneurysm. \"  Interval History:   Patient returns for f/u of [address] with subsequent seizure activity. Denies recurrent seizure-like activity or focal neurologic deficits since last visit. 24h EEG monitoring was completed 8/15/22 and normal.   He is compliant with LEV as prescribed. No reported side effects. PMHx/ PSHx/ FHx/ SHx:  Reviewed and unchanged previous visit. Past Medical History:   Diagnosis Date    Abdominal pain 09/12/2019    Arthritis     CKD (chronic kidney disease), stage III (HCC)     Headache     Hematuria     Hypertension     ICH (intracerebral hemorrhage) ([facility] [address] 07/22/2018    STM    IGT (impaired glucose tolerance) 06/27/2022    Morbid obesity with BMI of 40.0-44.9, adult ([facility] [address]     Plantar fasciitis, right 12/06/2018    Seizure disorder ([facility] [address] 01/19/2020    Dr Minerva Garcia, - keppra    Shoulder pain, bilateral 06/24/2022    Syncope and collapse          ROS:  Comprehensive review of systems negative except for as noted above.        Objective:       Meds:  Current Outpatient Medications   Medication Sig Dispense Refill    levETIRAcetam (KEPPRA) 500 mg tablet TAKE 1 TABLET TWICE A  Tablet 3    amLODIPine (NORVASC) 5 mg tablet TAKE 1 TABLET DAILY 90 Tablet 3    hydroCHLOROthiazide (HYDRODIURIL) 12.5 mg tablet TAKE 1 TABLET DAILY 90 Tablet 3    metoprolol tartrate (LOPRESSOR) 50 mg tablet TAKE 1 TABLET TWICE A  Tablet 3    rosuvastatin (CRESTOR) 10 mg tablet TAKE 1 TABLET NIGHTLY 90 Tablet 3    sildenafil citrate (VIAGRA) 100 mg tablet Take 1 Tablet by mouth as needed for Erectile Dysfunction. 20 Tablet 11       Exam:  Visit Vitals  /78   Pulse 95   Ht 5' 9\" (1.753 m)   Wt 279 lb (126.6 kg)   SpO2 97%   BMI 41.20 kg/m²     NEUROLOGICAL EXAM:  General: Awake, alert, occasional stuttering. Oriented to date, place, self. CN: EOMI, VF intact, facial strength/sensation normal and symmetric, hearing is intact  Motor: 5/5 throughout  Reflexes: 2/4 throughout  Coordination: No ataxia.   Sensation: LT intact throughout  Gait: Steady    LABS  Results for orders placed or performed in visit on 06/24/22   PSA, DIAGNOSTIC (PROSTATE SPECIFIC AG)   Result Value Ref Range    Prostate Specific Ag 1.4 0.0 - 4.0 ng/mL   HEMOGLOBIN A1C WITH EAG   Result Value Ref Range    Hemoglobin A1c 5.9 (H) 4.8 - 5.6 %    Estimated average glucose 123 mg/dL   LIPID PANEL   Result Value Ref Range    Cholesterol, total 158 100 - 199 mg/dL    Triglyceride 73 0 - 149 mg/dL    HDL Cholesterol 56 >39 mg/dL    VLDL, calculated 14 5 - 40 mg/dL    LDL, calculated 88 0 - 99 mg/dL   METABOLIC PANEL, COMPREHENSIVE   Result Value Ref Range    Glucose 100 (H) 65 - 99 mg/dL    BUN 16 6 - 24 mg/dL    Creatinine 1.23 0.76 - 1.27 mg/dL    eGFR 75 >59 mL/min/1.73    BUN/Creatinine ratio 13 9 - 20    Sodium 141 134 - 144 mmol/L    Potassium 4.4 3.5 - 5.2 mmol/L    Chloride 104 96 - 106 mmol/L    CO2 24 20 - 29 mmol/L    Calcium 9.4 8.7 - 10.2 mg/dL    Protein, total 7.6 6.0 - 8.5 g/dL    Albumin 4.6 4.0 - 5.0 g/dL    GLOBULIN, TOTAL 3.0 1.5 - 4.5 g/dL    A-G Ratio 1.5 1.2 - 2.2 Bilirubin, total 0.3 0.0 - 1.2 mg/dL    Alk. phosphatase 110 44 - 121 IU/L    AST (SGOT) 19 0 - 40 IU/L    ALT (SGPT) 23 0 - 44 IU/L   CBC WITH AUTOMATED DIFF   Result Value Ref Range    WBC 7.2 3.4 - 10.8 x10E3/uL    RBC 4.75 4.14 - 5.80 x10E6/uL    HGB 14.1 13.0 - 17.7 g/dL    HCT 43.9 37.5 - 51.0 %    MCV 92 79 - 97 fL    MCH 29.7 26.6 - 33.0 pg    MCHC 32.1 31.5 - 35.7 g/dL    RDW 13.2 11.6 - 15.4 %    PLATELET 369 046 - 749 x10E3/uL    NEUTROPHILS 50 Not Estab. %    Lymphocytes 41 Not Estab. %    MONOCYTES 7 Not Estab. %    EOSINOPHILS 2 Not Estab. %    BASOPHILS 0 Not Estab. %    ABS. NEUTROPHILS 3.5 1.4 - 7.0 x10E3/uL    Abs Lymphocytes 3.0 0.7 - 3.1 x10E3/uL    ABS. MONOCYTES 0.5 0.1 - 0.9 x10E3/uL    ABS. EOSINOPHILS 0.1 0.0 - 0.4 x10E3/uL    ABS. BASOPHILS 0.0 0.0 - 0.2 x10E3/uL    IMMATURE GRANULOCYTES 0 Not Estab. %    ABS. IMM. GRANS. 0.0 0.0 - 0.1 x10E3/uL   URINALYSIS W/ RFLX MICROSCOPIC   Result Value Ref Range    Specific Gravity 1.021 1.005 - 1.030    pH (UA) 5.5 5.0 - 7.5    Color Yellow Yellow    Appearance Clear Clear    Leukocyte Esterase Negative Negative    Protein Negative Negative/Trace    Glucose Negative Negative    Ketone Negative Negative    Blood Negative Negative    Bilirubin Negative Negative    Urobilinogen 0.2 0.2 - 1.0 mg/dL    Nitrites Negative Negative    Microscopic Examination Comment        IMAGING:  MRI Results (most recent):  Results from Hospital Encounter encounter on 07/22/18    MRA BRAIN WO CONT    Narrative  EXAM:  MRI BRAIN W WO CONT, MRA BRAIN WO CONT    INDICATION: Headache. Left cerebral intracranial intra-axial hematoma on CT. Hypertension. COMPARISON: CT head on 7/22/2018. TECHNIQUE: Multisequence, multiplanar MRI of the brain before and following  uneventful intravenous administration of gadolinium 20 mL Dotarem. Noncontrast  time of flight MR angiography of the head. Multiplanar maximum intensity  projection reformats.  (2 separate studies reported together)    FINDINGS: MRI brain: Hyperintense T1, hypointense T2 intra-axial hematoma in the  posterior left temporal lobe measures 4.4 x 2.7 x 1.8 cm, unchanged by my  measurements. Estimated volume is 10.7 mL. There is surrounding vasogenic edema, similar to the CT. No associated  restricted diffusion. No underlying mass or vascular malformation. There is no hydrocephalus. There is no midline shift. No extra-axial fluid  collection. No pathologic enhancement. No restricted diffusion to indicate acute  infarct. The midline structures, including the cervicomedullary junction, are  within normal limits. MRA head: The vertebral arteries are codominant. The basilar artery and its  branches are normal. The internal carotid, anterior cerebral, and middle  cerebral arteries are patent. There is no flow-limiting intracranial stenosis. There is no aneurysm. There are no sizable posterior communicating arteries. Impression  IMPRESSION:  1. Unchanged 10.7 mL intra-axial subacute hematoma in the posterior left  temporal lobe. Unchanged surrounding vasogenic edema. No evidence of underlying  infarct, mass, or vascular malformation. 2. Normal MR angiography of the head. CT Results (most recent):  Results from Hospital Encounter encounter on 01/19/20    CT HEAD WO CONT    Narrative  EXAM: CT HEAD WO CONT  Clinical history: New onset seizure  INDICATION: seizure    COMPARISON: None. CONTRAST: None. TECHNIQUE: Unenhanced CT of the head was performed using 5 mm images. Brain and  bone windows were generated. CT dose reduction was achieved through use of a  standardized protocol tailored for this examination and automatic exposure  control for dose modulation. FINDINGS:  The ventricles and sulci are normal in size, shape and configuration and  midline. There is no significant white matter disease. There is no intracranial  hemorrhage, extra-axial collection, mass, mass effect or midline shift. The  basilar cisterns are open. No acute infarct is identified. The bone windows  demonstrate no abnormalities. The visualized portions of the paranasal sinuses  and mastoid air cells are clear. Impression  IMPRESSION: No acute intracranial process. Assessment:     Encounter Diagnoses     ICD-10-CM ICD-9-CM   1. Witnessed seizure-like activity (HCC)  R56.9 780.39   2. Left-sided nontraumatic intracerebral hemorrhage, unspecified cerebral location [facility])  I61.9 431      37 y.o. AAM here for f/u, previously seen during admission for hypertensive L temporal ICH 7/22/18. MRI/A without evidence of underlying mass, ischemia or vascular malformation/aneurysm. H/O reported seizure-like activity 1/19/20 (GTC), subsequently maintained on LEV for seizure prophylaxis. 24h EEG was completed 8/15/22 due to episode of brief AMS without associated epileptiform activity. Will continue current AEDs for seizure prophylaxis. Plan:   Continue LEV 500mg BID   Syncope/seizure precautions      Follow-up and Dispositions    Return in about 1 year (around 1/25/2024).              Signed:  Dano Contreras,   1/25/2023

## 2023-01-25 NOTE — LETTER
NOTIFICATION RETURN TO WORK / SCHOOL    1/25/2023 1:03 PM    Mr. Jack Covarrubias  [facility] [address] [facility] [address]      To Whom It May Concern:    Jack Covarrubias is currently under the care of [facility]. Jett Bland had an appointment today in our office. If there are questions or concerns please have the patient contact our office.         Sincerely,      Devendra Gaytan, DO
no

## 2023-01-26 ENCOUNTER — TELEPHONE (OUTPATIENT)
Dept: NEUROLOGY | Age: 43
End: 2023-01-26

## 2023-01-26 NOTE — TELEPHONE ENCOUNTER
Patients wife's called and stated her  needs a doctors note for his employment for his absence   January 25th - return 01-30-23. Requesting to be faxed Arsenio    They would like it emailed to [email or web address]   for his records. Patient was in our office yesterday. Please contact with any questions.

## 2023-01-27 ENCOUNTER — OFFICE VISIT (OUTPATIENT)
Dept: INTERNAL MEDICINE CLINIC | Age: 43
End: 2023-01-27
Payer: COMMERCIAL

## 2023-01-27 VITALS
DIASTOLIC BLOOD PRESSURE: 77 MMHG | SYSTOLIC BLOOD PRESSURE: 134 MMHG | HEIGHT: 69 IN | HEART RATE: 67 BPM | RESPIRATION RATE: 18 BRPM | TEMPERATURE: 97.7 F | BODY MASS INDEX: 44.67 KG/M2 | WEIGHT: 301.6 LBS | OXYGEN SATURATION: 99 %

## 2023-01-27 DIAGNOSIS — I61.9 NONTRAUMATIC INTRACEREBRAL HEMORRHAGE, UNSPECIFIED CEREBRAL LOCATION, UNSPECIFIED LATERALITY (HCC): ICD-10-CM

## 2023-01-27 DIAGNOSIS — R73.02 IGT (IMPAIRED GLUCOSE TOLERANCE): ICD-10-CM

## 2023-01-27 DIAGNOSIS — E66.01 MORBID OBESITY WITH BMI OF 40.0-44.9, ADULT (HCC): ICD-10-CM

## 2023-01-27 DIAGNOSIS — G40.909 SEIZURE DISORDER (HCC): ICD-10-CM

## 2023-01-27 DIAGNOSIS — R60.0 LEG EDEMA, LEFT: ICD-10-CM

## 2023-01-27 DIAGNOSIS — I10 PRIMARY HYPERTENSION: Primary | ICD-10-CM

## 2023-01-27 DIAGNOSIS — I62.9 INTRACRANIAL BLEED (HCC): ICD-10-CM

## 2023-01-27 RX ORDER — METOPROLOL TARTRATE 50 MG/1
TABLET ORAL
Qty: 180 TABLET | Refills: 3 | Status: SHIPPED | OUTPATIENT
Start: 2023-01-27

## 2023-01-27 RX ORDER — HYDROCHLOROTHIAZIDE 12.5 MG/1
TABLET ORAL
Qty: 90 TABLET | Refills: 3 | Status: SHIPPED | OUTPATIENT
Start: 2023-01-27

## 2023-01-27 RX ORDER — ROSUVASTATIN CALCIUM 10 MG/1
TABLET, COATED ORAL
Qty: 90 TABLET | Refills: 3 | Status: SHIPPED | OUTPATIENT
Start: 2023-01-27

## 2023-01-27 RX ORDER — SILDENAFIL 100 MG/1
100 TABLET, FILM COATED ORAL AS NEEDED
Qty: 20 TABLET | Refills: 11 | Status: SHIPPED | OUTPATIENT
Start: 2023-01-27

## 2023-01-27 RX ORDER — AMLODIPINE BESYLATE 5 MG/1
TABLET ORAL
Qty: 90 TABLET | Refills: 3 | Status: SHIPPED | OUTPATIENT
Start: 2023-01-27

## 2023-01-27 RX ORDER — LEVETIRACETAM 500 MG/1
TABLET ORAL
Qty: 180 TABLET | Refills: 3 | Status: SHIPPED | OUTPATIENT
Start: 2023-01-27

## 2023-01-27 NOTE — PROGRESS NOTES
Dionicio Gonzales is a 37 y.o. male    Chief Complaint   Patient presents with    Hypertension     1. Have you been to the ER, urgent care clinic since your last visit? Hospitalized since your last visit? No    2. Have you seen or consulted any other health care providers outside of the [address] [place] [address] since your last visit? Include any pap smears or colon screening.  No

## 2023-01-27 NOTE — PROGRESS NOTES
SPORTS MEDICINE AND PRIMARY CARE  Jo-Ann Hines MD, [address] [address]  Phone:  [phone]  Fax: [phone]       Chief Complaint   Patient presents with    Hypertension   . SUBJECTIVE:    Fawad Mejia is a 37 y.o. male  Dictation on: 01/27/2023 12:40 PM by: Joseph Rojas [[id number]]          Current Outpatient Medications   Medication Sig Dispense Refill    amLODIPine (NORVASC) 5 mg tablet TAKE 1 TABLET DAILY 90 Tablet 3    hydroCHLOROthiazide (HYDRODIURIL) 12.5 mg tablet TAKE 1 TABLET DAILY 90 Tablet 3    levETIRAcetam (KEPPRA) 500 mg tablet TAKE 1 TABLET TWICE A  Tablet 3    metoprolol tartrate (LOPRESSOR) 50 mg tablet TAKE 1 TABLET TWICE A  Tablet 3    rosuvastatin (CRESTOR) 10 mg tablet TAKE 1 TABLET NIGHTLY 90 Tablet 3    sildenafil citrate (VIAGRA) 100 mg tablet Take 1 Tablet by mouth as needed for Erectile Dysfunction. 20 Tablet 11     Past Medical History:   Diagnosis Date    Abdominal pain 09/12/2019    Arthritis     Headache     Hematuria     Hypertension     ICH (intracerebral hemorrhage) ([facility] [address] 07/22/2018    STM    IGT (impaired glucose tolerance) 06/27/2022    Leg edema, left 01/27/2023    Morbid obesity with BMI of 40.0-44.9, adult ([facility] [address]     Plantar fasciitis, right 12/06/2018    Seizure disorder ([facility] [address] 01/19/2020    Dr Vianney Martin, - keppra    Shoulder pain, bilateral 06/24/2022    Syncope and collapse      History reviewed. No pertinent surgical history.   No Known Allergies      REVIEW OF SYSTEMS:  General: negative for - chills or fever  ENT: negative for - headaches, nasal congestion or tinnitus  Respiratory: negative for - cough, hemoptysis, shortness of breath or wheezing  Cardiovascular : negative for - chest pain, edema, palpitations or shortness of breath  Gastrointestinal: negative for - abdominal pain, blood in stools, heartburn or nausea/vomiting  Genito-Urinary: no dysuria, trouble voiding, or hematuria  Musculoskeletal: negative for - gait disturbance, joint pain, joint stiffness or joint swelling  Neurological: no TIA or stroke symptoms  Hematologic: no bruises, no bleeding, no swollen glands  Integument: no lumps, mole changes, nail changes or rash  Endocrine: no malaise/lethargy or unexpected weight changes      Social History     Socioeconomic History    Marital status: SINGLE   Tobacco Use    Smoking status: Never    Smokeless tobacco: Never   Vaping Use    Vaping Use: Never used   Substance and Sexual Activity    Alcohol use: No    Drug use: No    Sexual activity: Yes     Partners: Female     Birth control/protection: None   Social History Narrative    Patient is a lifetime nonsmoker, non drinker, non drug abuser. Social History:  The patient is  lives with his wife Ronna Cm -chf ckd LAB pt who is also the mother of his three children, 16, 12 and 2. He completed the 12th grade. 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 Mother 83 - [address] [facility]  with diabetes. Three sisters are alive and well. Family History   Problem Relation Age of Onset    Hypertension Mother     Heart Disease Father        OBJECTIVE:    Visit Vitals  /77 (BP 1 Location: Left upper arm, BP Patient Position: Sitting)   Pulse 67   Temp 97.7 °F (36.5 °C) (Oral)   Resp 18   Ht 5' 9\" (1.753 m)   Wt 301 lb 9.6 oz (136.8 kg)   SpO2 99%   BMI 44.54 kg/m²     CONSTITUTIONAL: well , well nourished, appears age appropriate  EYES: perrla, eom intact  ENMT:moist mucous membranes, pharynx clear  NECK: supple. Thyroid normal  RESPIRATORY: Chest: clear bilaterally   CARDIOVASCULAR: Heart: regular rate and rhythm  GASTROINTESTINAL: Abdomen: soft, bowel sounds active  HEMATOLOGIC: no pathological lymph nodes palpated  MUSCULOSKELETAL: Extremities: no edema, pulse 1+   INTEGUMENT: No unusual rashes or suspicious skin lesions noted.  Nails appear normal.  NEUROLOGIC: non-focal exam   MENTAL STATUS: alert and oriented, appropriate affect           ASSESSMENT:  1. Primary hypertension    2. Morbid obesity with BMI of 40.0-44.9, adult ([facility] [address]    3. IGT (impaired glucose tolerance)    4. Seizure disorder ([facility] [address]    5. Nontraumatic intracerebral hemorrhage, unspecified cerebral location, unspecified laterality ([facility] [address]    6. Intracranial bleed (HCC)    7. Leg edema, left       Dictation on: 01/27/2023 12:41 PM by: Milton Ortega     I have discussed the diagnosis with the patient and the intended plan as seen in the  Orders. The patient understands and agees with the plan. The patient has   received an after visit summary and questions were answered concerning  future plans  Patient labs and/or xrays were reviewed  Past records were reviewed. PLAN:  .  Orders Placed This Encounter    RENAL FUNCTION PANEL    HEMOGLOBIN A1C WITH EAG    amLODIPine (NORVASC) 5 mg tablet    hydroCHLOROthiazide (HYDRODIURIL) 12.5 mg tablet    levETIRAcetam (KEPPRA) 500 mg tablet    metoprolol tartrate (LOPRESSOR) 50 mg tablet    rosuvastatin (CRESTOR) 10 mg tablet    sildenafil citrate (VIAGRA) 100 mg tablet       Follow-up and Dispositions    Return in about 1 week (around 2/3/2023). ATTENTION:   This medical record was transcribed using an electronic medical records system. Although proofread, it may and can contain electronic and spelling errors. Other human spelling and other errors may be present. Corrections may be executed at a later time. Please feel free to contact us for any clarifications as needed.

## 2023-01-28 LAB
ALBUMIN SERPL-MCNC: 4.4 G/DL (ref 4–5)
BUN SERPL-MCNC: 15 MG/DL (ref 6–24)
BUN/CREAT SERPL: 12 (ref 9–20)
CALCIUM SERPL-MCNC: 9.2 MG/DL (ref 8.7–10.2)
CHLORIDE SERPL-SCNC: 104 MMOL/L (ref 96–106)
CO2 SERPL-SCNC: 26 MMOL/L (ref 20–29)
CREAT SERPL-MCNC: 1.21 MG/DL (ref 0.76–1.27)
EGFR: 76 ML/MIN/1.73
EST. AVERAGE GLUCOSE BLD GHB EST-MCNC: 126 MG/DL
GLUCOSE SERPL-MCNC: 100 MG/DL (ref 70–99)
HBA1C MFR BLD: 6 % (ref 4.8–5.6)
PHOSPHATE SERPL-MCNC: 4 MG/DL (ref 2.8–4.1)
POTASSIUM SERPL-SCNC: 3.5 MMOL/L (ref 3.5–5.2)
SODIUM SERPL-SCNC: 142 MMOL/L (ref 134–144)

## 2023-02-02 ENCOUNTER — HOSPITAL ENCOUNTER (OUTPATIENT)
Dept: VASCULAR SURGERY | Age: 43
Discharge: HOME | End: 2023-02-02
Attending: INTERNAL MEDICINE
Payer: COMMERCIAL

## 2023-02-02 DIAGNOSIS — R60.0 LEG EDEMA, LEFT: ICD-10-CM

## 2023-02-02 PROCEDURE — 93970 EXTREMITY STUDY: CPT

## 2023-02-08 NOTE — TELEPHONE ENCOUNTER
Called and spoke to the Pt. Per Dr. Angelika Barroso:  I did not approve this absence from work. He will need to discuss this with whomever instructed him to take a leave of absence from work.